# Patient Record
Sex: FEMALE | Race: WHITE | NOT HISPANIC OR LATINO | Employment: OTHER | URBAN - METROPOLITAN AREA
[De-identification: names, ages, dates, MRNs, and addresses within clinical notes are randomized per-mention and may not be internally consistent; named-entity substitution may affect disease eponyms.]

---

## 2017-12-26 ENCOUNTER — TELEPHONE (OUTPATIENT)
Dept: OBSTETRICS AND GYNECOLOGY | Facility: CLINIC | Age: 57
End: 2017-12-26

## 2017-12-26 NOTE — TELEPHONE ENCOUNTER
Pt thinks she has a yeast infection.  C/o external itching and irritation.  She has tried Monistat without relief.  Scheduled new pt appt 1/2 and recommended she continue to use external cream until appt.  Advised this appt will not be for annual exam this will be for problem visit only, if the yeast infection clears up to call back to reschedule appt to an annual/new pt appt.

## 2018-01-02 ENCOUNTER — OFFICE VISIT (OUTPATIENT)
Dept: OBSTETRICS AND GYNECOLOGY | Facility: CLINIC | Age: 58
End: 2018-01-02
Payer: COMMERCIAL

## 2018-01-02 ENCOUNTER — LAB VISIT (OUTPATIENT)
Dept: LAB | Facility: HOSPITAL | Age: 58
End: 2018-01-02
Attending: OBSTETRICS & GYNECOLOGY
Payer: COMMERCIAL

## 2018-01-02 VITALS
DIASTOLIC BLOOD PRESSURE: 78 MMHG | HEIGHT: 65 IN | SYSTOLIC BLOOD PRESSURE: 142 MMHG | BODY MASS INDEX: 32.23 KG/M2 | WEIGHT: 193.44 LBS

## 2018-01-02 DIAGNOSIS — R73.03 PRE-DIABETES: Primary | ICD-10-CM

## 2018-01-02 DIAGNOSIS — N76.0 ACUTE VAGINITIS: ICD-10-CM

## 2018-01-02 DIAGNOSIS — R73.03 PRE-DIABETES: ICD-10-CM

## 2018-01-02 LAB
CANDIDA RRNA VAG QL PROBE: NEGATIVE
ESTIMATED AVG GLUCOSE: 346 MG/DL
G VAGINALIS RRNA GENITAL QL PROBE: POSITIVE
HBA1C MFR BLD HPLC: 13.7 %
T VAGINALIS RRNA GENITAL QL PROBE: NEGATIVE

## 2018-01-02 PROCEDURE — 87480 CANDIDA DNA DIR PROBE: CPT

## 2018-01-02 PROCEDURE — 99999 PR PBB SHADOW E&M-EST. PATIENT-LVL III: CPT | Mod: PBBFAC,,, | Performed by: OBSTETRICS & GYNECOLOGY

## 2018-01-02 PROCEDURE — 99203 OFFICE O/P NEW LOW 30 MIN: CPT | Mod: S$GLB,,, | Performed by: OBSTETRICS & GYNECOLOGY

## 2018-01-02 PROCEDURE — 83036 HEMOGLOBIN GLYCOSYLATED A1C: CPT

## 2018-01-02 RX ORDER — BUPROPION HYDROCHLORIDE 150 MG/1
TABLET, FILM COATED, EXTENDED RELEASE ORAL
Refills: 0 | COMMUNITY
Start: 2017-12-22 | End: 2018-01-02

## 2018-01-02 RX ORDER — FLUCONAZOLE 150 MG/1
150 TABLET ORAL ONCE
Qty: 1 TABLET | Refills: 1 | Status: SHIPPED | OUTPATIENT
Start: 2018-01-02 | End: 2018-01-02

## 2018-01-02 RX ORDER — HYDROCORTISONE AND ACETIC ACID 20.75; 10.375 MG/ML; MG/ML
SOLUTION AURICULAR (OTIC)
Refills: 3 | COMMUNITY
Start: 2017-12-29 | End: 2023-07-20

## 2018-01-02 RX ORDER — SULFACETAMIDE SODIUM 100 MG/ML
SOLUTION/ DROPS OPHTHALMIC
Refills: 5 | COMMUNITY
Start: 2017-12-28 | End: 2023-06-22

## 2018-01-02 RX ORDER — FLUTICASONE PROPIONATE 50 MCG
SPRAY, SUSPENSION (ML) NASAL
Refills: 10 | COMMUNITY
Start: 2017-12-29 | End: 2019-06-04

## 2018-01-02 RX ORDER — IBUPROFEN 200 MG
TABLET ORAL
Refills: 0 | COMMUNITY
Start: 2017-12-22 | End: 2019-06-04

## 2018-01-02 RX ORDER — BETAMETHASONE VALERATE 1.2 MG/G
1 OINTMENT TOPICAL 2 TIMES DAILY
Refills: 0 | COMMUNITY
Start: 2017-12-30 | End: 2018-01-02

## 2018-01-02 RX ORDER — NYSTATIN AND TRIAMCINOLONE ACETONIDE 100000; 1 [USP'U]/G; MG/G
CREAM TOPICAL
Qty: 30 G | Refills: 1 | Status: SHIPPED | OUTPATIENT
Start: 2018-01-02 | End: 2019-06-04

## 2018-01-02 RX ORDER — FEXOFENADINE HCL 60 MG
60 TABLET ORAL DAILY
COMMUNITY
End: 2019-06-04

## 2018-01-05 ENCOUNTER — TELEPHONE (OUTPATIENT)
Dept: OBSTETRICS AND GYNECOLOGY | Facility: CLINIC | Age: 58
End: 2018-01-05

## 2018-01-05 RX ORDER — METRONIDAZOLE 7.5 MG/G
1 GEL VAGINAL DAILY
Qty: 70 G | Refills: 0 | Status: SHIPPED | OUTPATIENT
Start: 2018-01-05 | End: 2018-01-10

## 2018-01-05 NOTE — TELEPHONE ENCOUNTER
Discussed blood labs and vaginal cultures will follow up with internal medicine for DM management

## 2018-01-07 NOTE — PROGRESS NOTES
Subjective:       Patient ID: Linn Price is a 57 y.o. female.    Chief Complaint:  Vaginal Itching (vaginal discharge, burning, dryness, and inflamations )      History of Present Illness  57 year old here with a several day to week history of persistent vulvar itching and pain.  Patient feel raw on her vulva making it hard to wear clothing or anything touch her bottom.  Denies vaginal bleeding or discharge but reports itching/burning feeling on the outside of her vagina.  No pelvic pain no bladder pain.  Denies frequency or dysuria.      GYN & OB History  No LMP recorded. Patient is postmenopausal.   Date of Last Pap: No result found    OB History    Para Term  AB Living   0 0 0 0 0 0   SAB TAB Ectopic Multiple Live Births   0 0 0 0           Obstetric Comments   Menarche age 11.  LMP age 48.   History of abnormal PAP smear: NO.   History of abnormal mammogram: NO.   History of sexually transmitted disease:  NO       Past Medical History:   Diagnosis Date    Benign hypertension 2014    Broken fingers     Broken toe     Colon polyps 2014    Diabetes mellitus     Elevated fasting glucose 2014    Family history of bladder cancer     Family history of heart disease 2014    History of broken nose     JANAE on CPAP 11/15/2007    Per PSG: Rx'd CPAP, pressure 13, using small Comfort Select mask or interface of patient's choice, chin strap, and heated humidifier      Reflux esophagitis 2014    Per EGD 2006      Ulcer        Past Surgical History:   Procedure Laterality Date    GASTRIC RESTRICTION SURGERY      pyloric stenosis as infant    WISDOM TOOTH EXTRACTION         Review of Systems  Review of Systems   Respiratory: Negative for stridor.    Cardiovascular: Negative for chest pain.   Gastrointestinal: Negative for abdominal distention and constipation.   Endocrine: Negative for heat intolerance.   Genitourinary: Negative for difficulty urinating, dysuria,  genital sores, menstrual problem, pelvic pain and urgency.        Vulvar burning and pain    Psychiatric/Behavioral: Negative for dysphoric mood.        Objective:   Physical Exam:        Pulmonary/Chest: Effort normal.        Abdominal: Soft. She exhibits no distension.     Genitourinary:   Genitourinary Comments: Entire vulvar irritated and covered with white   Vagina no yeast seen and cervix normal  No bimanual performed due to discomfort   No vulvar lesion or ulcers               Neurological: She is alert.     Psychiatric: She has a normal mood and affect.        Assessment/ Plan:     Pre-diabetes  -     Hemoglobin A1c; Future    Acute vaginitis  -     Vaginosis Screen by DNA Probe    Other orders  -     fluconazole (DIFLUCAN) 150 MG Tab; Take 1 tablet (150 mg total) by mouth once. REFILL AND RE-DOSE IF SYMPTOMS RECUR  Dispense: 1 tablet; Refill: 1  -     nystatin-triamcinolone (MYCOLOG II) cream; Apply to affected area 2 times daily  Dispense: 30 g; Refill: 1         Return in about 3 months (around 4/2/2018) for annual exam .    Patient was counseled today on A.C.S. Pap guidelines and recommendations for yearly pelvic exams, mammograms and monthly self breast exams; to see her PCP for other health maintenance.

## 2018-04-18 DIAGNOSIS — Z12.31 VISIT FOR SCREENING MAMMOGRAM: Primary | ICD-10-CM

## 2018-05-08 ENCOUNTER — APPOINTMENT (OUTPATIENT)
Dept: RADIOLOGY | Facility: OTHER | Age: 58
End: 2018-05-08
Attending: OBSTETRICS & GYNECOLOGY
Payer: COMMERCIAL

## 2018-05-08 ENCOUNTER — OFFICE VISIT (OUTPATIENT)
Dept: OBSTETRICS AND GYNECOLOGY | Facility: CLINIC | Age: 58
End: 2018-05-08
Payer: COMMERCIAL

## 2018-05-08 VITALS — BODY MASS INDEX: 32.15 KG/M2 | HEIGHT: 65 IN | WEIGHT: 193 LBS

## 2018-05-08 DIAGNOSIS — Z13.820 SCREENING FOR OSTEOPOROSIS: Primary | ICD-10-CM

## 2018-05-08 DIAGNOSIS — Z01.419 ENCOUNTER FOR GYNECOLOGICAL EXAMINATION (GENERAL) (ROUTINE) WITHOUT ABNORMAL FINDINGS: ICD-10-CM

## 2018-05-08 DIAGNOSIS — Z12.4 SCREENING FOR CERVICAL CANCER: ICD-10-CM

## 2018-05-08 DIAGNOSIS — Z12.31 VISIT FOR SCREENING MAMMOGRAM: ICD-10-CM

## 2018-05-08 PROCEDURE — 99396 PREV VISIT EST AGE 40-64: CPT | Mod: S$GLB,,, | Performed by: OBSTETRICS & GYNECOLOGY

## 2018-05-08 PROCEDURE — 99999 PR PBB SHADOW E&M-EST. PATIENT-LVL II: CPT | Mod: PBBFAC,,, | Performed by: OBSTETRICS & GYNECOLOGY

## 2018-05-08 PROCEDURE — 87624 HPV HI-RISK TYP POOLED RSLT: CPT

## 2018-05-08 PROCEDURE — 77063 BREAST TOMOSYNTHESIS BI: CPT | Mod: 26,,, | Performed by: RADIOLOGY

## 2018-05-08 PROCEDURE — 77067 SCR MAMMO BI INCL CAD: CPT | Mod: 26,,, | Performed by: RADIOLOGY

## 2018-05-08 PROCEDURE — 88175 CYTOPATH C/V AUTO FLUID REDO: CPT

## 2018-05-08 PROCEDURE — 77067 SCR MAMMO BI INCL CAD: CPT | Mod: TC,PN

## 2018-05-14 LAB
HPV16 AG SPEC QL: NEGATIVE
HPV16+18+H RISK 12 DNA CVX-IMP: NEGATIVE
HPV18 DNA SPEC QL NAA+PROBE: NEGATIVE

## 2018-05-15 ENCOUNTER — APPOINTMENT (OUTPATIENT)
Dept: RADIOLOGY | Facility: CLINIC | Age: 58
End: 2018-05-15
Attending: OBSTETRICS & GYNECOLOGY
Payer: COMMERCIAL

## 2018-05-15 DIAGNOSIS — Z13.820 SCREENING FOR OSTEOPOROSIS: ICD-10-CM

## 2018-05-15 PROCEDURE — 77080 DXA BONE DENSITY AXIAL: CPT | Mod: 26,,, | Performed by: INTERNAL MEDICINE

## 2018-05-15 PROCEDURE — 77080 DXA BONE DENSITY AXIAL: CPT | Mod: TC,PO

## 2018-06-02 NOTE — PROGRESS NOTES
Subjective:       Patient ID: Linn Price is a 58 y.o. female.    Chief Complaint:  Well Woman (Annual Exam  --  Last Pap ? (Dr Brent Wilson)  --  Last MMG Today 18  --  Colonosocpy , Polyp  --  Dexa )      History of Present Illness  58 year old here for annual exam.  No new concerns.  Desires pap and due for bone density.  Patient is scheduled for mammogram today.  Denies pelvic pain or vaginal bleeding.  No breast concerns.  No hot flashes or menopause concerns.      GYN & OB History  No LMP recorded. Patient is postmenopausal.   Date of Last Pap: 2018    OB History    Para Term  AB Living   0 0 0 0 0 0   SAB TAB Ectopic Multiple Live Births   0 0 0 0           Obstetric Comments   Menarche age 11.  LMP age 48.   History of abnormal PAP smear: NO.   History of abnormal mammogram: NO.   History of sexually transmitted disease:  NO       Past Medical History:   Diagnosis Date    Benign hypertension 2014    Broken fingers     Broken toe     Colon polyps 2014    Diabetes mellitus     Elevated fasting glucose 2014    Family history of bladder cancer     Family history of heart disease 2014    History of broken nose     Menopause     JANAE on CPAP 11/15/2007    Per PSG: Rx'd CPAP, pressure 13, using small Comfort Select mask or interface of patient's choice, chin strap, and heated humidifier      Reflux esophagitis 2014    Per EGD 2006      Stenosis, intestine     as a child    Ulcer        Past Surgical History:   Procedure Laterality Date    COLONOSCOPY      Scheduled for 2018     GASTRIC RESTRICTION SURGERY      pyloric stenosis as infant    UPPER GASTROINTESTINAL ENDOSCOPY  2006    WISDOM TOOTH EXTRACTION         Review of Systems  Review of Systems   Constitutional: Negative for chills and fever.   Respiratory: Negative for chest tightness.    Cardiovascular: Negative for chest pain.   Gastrointestinal: Negative for abdominal  pain.   Endocrine: Negative for heat intolerance.   Genitourinary: Negative for menstrual problem, pelvic pain and vaginal bleeding.   Psychiatric/Behavioral: Negative for dysphoric mood.        Objective:   Physical Exam:   Constitutional: She is oriented to person, place, and time. She appears well-developed and well-nourished.      Neck: No thyromegaly present.    Cardiovascular: Normal rate.     Pulmonary/Chest: Effort normal and breath sounds normal. Right breast exhibits no mass, no nipple discharge, no skin change, no tenderness and no bleeding. Left breast exhibits no mass, no nipple discharge, no skin change, no tenderness and no bleeding.        Abdominal: Soft. Normal appearance and bowel sounds are normal. She exhibits no distension and no mass. There is no tenderness. There is no rebound and no guarding.     Genitourinary: Vagina normal and uterus normal. Pelvic exam was performed with patient supine. There is no rash, tenderness, lesion or injury on the right labia. There is no rash, tenderness, lesion or injury on the left labia. Uterus is not enlarged, not fixed and not tender. Cervix is normal. Right adnexum displays no mass, no tenderness and no fullness. Left adnexum displays no mass, no tenderness and no fullness. No erythema, tenderness, rectocele, cystocele or unspecified prolapse of vaginal walls in the vagina. No signs of injury around the vagina. No vaginal discharge found. Cervix exhibits no motion tenderness, no discharge and no friability.           Musculoskeletal: Normal range of motion and moves all extremeties.      Lymphadenopathy:     She has no axillary adenopathy.        Right: No supraclavicular adenopathy present.        Left: No supraclavicular adenopathy present.    Neurological: She is alert and oriented to person, place, and time.    Skin: Skin is warm and dry.    Psychiatric: She has a normal mood and affect. Her behavior is normal. Judgment normal.        Assessment/ Plan:      Screening for osteoporosis  -     DXA Bone Density Spine And Hip; Future; Expected date: 05/08/2018    Screening for cervical cancer  -     Liquid-based pap smear, screening  -     HPV High Risk Genotypes, PCR    Encounter for gynecological examination (general) (routine) without abnormal findings    mammogram today      No Follow-up on file.    Patient was counseled today on A.C.S. Pap guidelines and recommendations for yearly pelvic exams, mammograms and monthly self breast exams; to see her PCP for other health maintenance.

## 2018-10-27 ENCOUNTER — OFFICE VISIT (OUTPATIENT)
Dept: URGENT CARE | Facility: CLINIC | Age: 58
End: 2018-10-27
Payer: COMMERCIAL

## 2018-10-27 VITALS
RESPIRATION RATE: 19 BRPM | TEMPERATURE: 97 F | OXYGEN SATURATION: 97 % | SYSTOLIC BLOOD PRESSURE: 146 MMHG | WEIGHT: 170 LBS | DIASTOLIC BLOOD PRESSURE: 84 MMHG | HEART RATE: 71 BPM | BODY MASS INDEX: 28.32 KG/M2 | HEIGHT: 65 IN
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
## 2018-10-27 DIAGNOSIS — M79.642 LEFT HAND PAIN: ICD-10-CM

## 2018-10-27 PROCEDURE — 3079F DIAST BP 80-89 MM HG: CPT | Mod: CPTII,S$GLB,, | Performed by: NURSE PRACTITIONER

## 2018-10-27 PROCEDURE — 3008F BODY MASS INDEX DOCD: CPT | Mod: CPTII,S$GLB,, | Performed by: NURSE PRACTITIONER

## 2018-10-27 PROCEDURE — 73130 X-RAY EXAM OF HAND: CPT | Mod: FY,LT,S$GLB, | Performed by: INTERNAL MEDICINE

## 2018-10-27 PROCEDURE — 3077F SYST BP >= 140 MM HG: CPT | Mod: CPTII,S$GLB,, | Performed by: NURSE PRACTITIONER

## 2018-10-27 PROCEDURE — 99214 OFFICE O/P EST MOD 30 MIN: CPT | Mod: S$GLB,,, | Performed by: NURSE PRACTITIONER

## 2018-10-27 RX ORDER — METFORMIN HYDROCHLORIDE 500 MG/1
500 TABLET, EXTENDED RELEASE ORAL DAILY
Refills: 1 | COMMUNITY
Start: 2018-10-08 | End: 2021-03-15 | Stop reason: SDUPTHER

## 2018-10-27 RX ORDER — NAPROXEN 500 MG/1
500 TABLET ORAL 2 TIMES DAILY WITH MEALS
Qty: 20 TABLET | Refills: 0 | Status: SHIPPED | OUTPATIENT
Start: 2018-10-27 | End: 2018-11-06

## 2018-10-27 NOTE — PROGRESS NOTES
"Subjective:       Patient ID: Linn Price is a 58 y.o. female.    Vitals:  height is 5' 5" (1.651 m) and weight is 77.1 kg (170 lb). Her oral temperature is 97.2 °F (36.2 °C). Her blood pressure is 146/84 (abnormal) and her pulse is 71. Her respiration is 19 and oxygen saturation is 97%.     Chief Complaint: Hand Injury (left )    Hand Injury    Her dominant hand is their right hand. The incident occurred 12 to 24 hours ago. The incident occurred at home. The injury mechanism was a fall. The pain is present in the left hand. The quality of the pain is described as shooting and aching. The pain radiates to the left arm. The pain is at a severity of 8/10. The pain is severe. The pain has been constant since the incident. Pertinent negatives include no chest pain, muscle weakness, numbness or tingling. The symptoms are aggravated by movement and lifting. She has tried heat for the symptoms. The treatment provided mild relief.     Review of Systems   Constitution: Negative for weakness and malaise/fatigue.   HENT: Negative for nosebleeds.    Cardiovascular: Negative for chest pain and syncope.   Respiratory: Negative for shortness of breath.    Musculoskeletal: Negative for back pain, joint pain and neck pain.   Gastrointestinal: Negative for abdominal pain.   Genitourinary: Negative for hematuria.   Neurological: Negative for dizziness, numbness and tingling.       Objective:      Physical Exam   Constitutional: She is oriented to person, place, and time. She appears well-developed and well-nourished. She is cooperative.  Non-toxic appearance. She does not appear ill. No distress.   HENT:   Head: Normocephalic and atraumatic. Head is without abrasion, without contusion and without laceration.   Right Ear: Hearing, tympanic membrane, external ear and ear canal normal. No hemotympanum.   Left Ear: Hearing, tympanic membrane, external ear and ear canal normal. No hemotympanum.   Nose: Nose normal. No mucosal edema, " rhinorrhea or nasal deformity. No epistaxis. Right sinus exhibits no maxillary sinus tenderness and no frontal sinus tenderness. Left sinus exhibits no maxillary sinus tenderness and no frontal sinus tenderness.   Mouth/Throat: Uvula is midline, oropharynx is clear and moist and mucous membranes are normal. No trismus in the jaw. Normal dentition. No uvula swelling. No posterior oropharyngeal erythema.   Eyes: Conjunctivae, EOM and lids are normal. Pupils are equal, round, and reactive to light. Right eye exhibits no discharge. Left eye exhibits no discharge. No scleral icterus.   Sclera clear bilat   Neck: Trachea normal, normal range of motion, full passive range of motion without pain and phonation normal. Neck supple. No spinous process tenderness and no muscular tenderness present. No neck rigidity. No tracheal deviation present.   Cardiovascular: Normal rate, regular rhythm, normal heart sounds, intact distal pulses and normal pulses.   Pulmonary/Chest: Effort normal and breath sounds normal. No respiratory distress.   Abdominal: Soft. Normal appearance and bowel sounds are normal. She exhibits no distension, no pulsatile midline mass and no mass. There is no tenderness.   Musculoskeletal: She exhibits no edema or deformity.        Right shoulder: She exhibits decreased range of motion, tenderness, bony tenderness and swelling.        Arms:  Left hand, dorsal surface-swelling and TTP over head of third metacarpal.    Neurological: She is alert and oriented to person, place, and time. She has normal strength. No cranial nerve deficit or sensory deficit. She exhibits normal muscle tone. She displays no seizure activity. Coordination normal. GCS eye subscore is 4. GCS verbal subscore is 5. GCS motor subscore is 6.   Skin: Skin is warm, dry and intact. Capillary refill takes less than 2 seconds. No abrasion, no bruising, no burn, no ecchymosis and no laceration noted. She is not diaphoretic. No pallor.    Psychiatric: She has a normal mood and affect. Her speech is normal and behavior is normal. Judgment and thought content normal. Cognition and memory are normal.   Nursing note and vitals reviewed.    Xr Hand Complete 3 View Left    Result Date: 10/27/2018  EXAMINATION: XR HAND COMPLETE 3 VIEW LEFT CLINICAL HISTORY: pain dorsal aspect,;. Unspecified fall, initial encounter TECHNIQUE: PA, lateral, and oblique views of the left hand were performed. COMPARISON: None FINDINGS: There are mild degenerative changes within the hand, particularly at distal interphalangeal joints.  There is no evidence of fracture.     Mild degenerative change Electronically signed by: Barbara Martines MD Date:    10/27/2018 Time:    17:04    Assessment:       1. Fall, initial encounter    2. Left hand pain        Plan:         Fall, initial encounter  -     XR HAND COMPLETE 3 VIEW LEFT; Future; Expected date: 10/27/2018  -     Cancel: ORTHOPEDIC BRACING FOR HOME USE - UPPER EXTREMITY  -     naproxen (NAPROSYN) 500 MG tablet; Take 1 tablet (500 mg total) by mouth 2 (two) times daily with meals. for 10 days  Dispense: 20 tablet; Refill: 0    Left hand pain  -     XR HAND COMPLETE 3 VIEW LEFT; Future; Expected date: 10/27/2018  -     Cancel: ORTHOPEDIC BRACING FOR HOME USE - UPPER EXTREMITY  -     naproxen (NAPROSYN) 500 MG tablet; Take 1 tablet (500 mg total) by mouth 2 (two) times daily with meals. for 10 days  Dispense: 20 tablet; Refill: 0      Patient Instructions   Follow up with your doctor in a few days.  Return to the urgent care or go to the ER if symptoms get worse.    The final reading of your xray showed no dislocation or fracture.    Keep hand immobilizer for the next week and then based on comfort.  Apply cool compress 3 times a day, 20min at a time , for the next 3 days.  Elevate when able.      Hand Contusion  You have a contusion. This is also called a bruise. There is swelling and some bleeding under the skin, but no broken  bones. This injury generally takes a few days to a few weeks to heal.  During that time, the bruise will typically change in color from reddish, to purple-blue, to greenish-yellow, then to yellow-brown.  Home care  · Elevate the hand to reduce pain and swelling. As much as possible, sit or lie down with the hand raised about the level of your heart. This is especially important during the first 48 hours.  · Ice the hand to help reduce pain and swelling. Wrap a cold source (ice pack or ice cubes in a plastic bag) in a thin towel. Apply to the bruised area for 20 minutes every 1 to 2 hours the first day. Continue this 3 to 4 times a day until the pain and swelling goes away.  · Unless another medicine was prescribed, you can take acetaminophen, ibuprofen, or naproxen to control pain. (If you have chronic liver or kidney disease or ever had a stomach ulcer or gastrointestinal bleeding, talk with your doctor before using these medicines.)  Follow up  Follow up with your healthcare provider or our staff as advised. Call if you are not improving within 1 to 2 weeks.  When to seek medical advice   Call your healthcare provider right away if you have any of the following:  · Increased pain or swelling  · Arm becomes cold, blue, numb or tingly  · Signs of infection: Warmth, drainage, or increased redness or pain around the bruise  · Inability to move the injured hand   · Frequent bruising for unknown reasons  Date Last Reviewed: 2/1/2017  © 3734-5406 [employer]. [address] [place] [address], [place] [address]. All rights reserved. This information is not intended as a substitute for professional medical care. Always follow your healthcare professional's instructions.

## 2018-10-27 NOTE — PATIENT INSTRUCTIONS
Follow up with your doctor in a few days.  Return to the urgent care or go to the ER if symptoms get worse.    The final reading of your xray showed no dislocation or fracture.    Keep hand immobilizer for the next week and then based on comfort.  Apply cool compress 3 times a day, 20min at a time , for the next 3 days.  Elevate when able.      Hand Contusion  You have a contusion. This is also called a bruise. There is swelling and some bleeding under the skin, but no broken bones. This injury generally takes a few days to a few weeks to heal.  During that time, the bruise will typically change in color from reddish, to purple-blue, to greenish-yellow, then to yellow-brown.  Home care  · Elevate the hand to reduce pain and swelling. As much as possible, sit or lie down with the hand raised about the level of your heart. This is especially important during the first 48 hours.  · Ice the hand to help reduce pain and swelling. Wrap a cold source (ice pack or ice cubes in a plastic bag) in a thin towel. Apply to the bruised area for 20 minutes every 1 to 2 hours the first day. Continue this 3 to 4 times a day until the pain and swelling goes away.  · Unless another medicine was prescribed, you can take acetaminophen, ibuprofen, or naproxen to control pain. (If you have chronic liver or kidney disease or ever had a stomach ulcer or gastrointestinal bleeding, talk with your doctor before using these medicines.)  Follow up  Follow up with your healthcare provider or our staff as advised. Call if you are not improving within 1 to 2 weeks.  When to seek medical advice   Call your healthcare provider right away if you have any of the following:  · Increased pain or swelling  · Arm becomes cold, blue, numb or tingly  · Signs of infection: Warmth, drainage, or increased redness or pain around the bruise  · Inability to move the injured hand   · Frequent bruising for unknown reasons  Date Last Reviewed: 2/1/2017  © 0538-0079  [employer]. [address] [place] [address], [place] [address]. All rights reserved. This information is not intended as a substitute for professional medical care. Always follow your healthcare professional's instructions.

## 2019-06-04 ENCOUNTER — APPOINTMENT (OUTPATIENT)
Dept: RADIOLOGY | Facility: OTHER | Age: 59
End: 2019-06-04
Attending: OBSTETRICS & GYNECOLOGY
Payer: COMMERCIAL

## 2019-06-04 ENCOUNTER — OFFICE VISIT (OUTPATIENT)
Dept: OBSTETRICS AND GYNECOLOGY | Facility: CLINIC | Age: 59
End: 2019-06-04
Payer: COMMERCIAL

## 2019-06-04 VITALS
BODY MASS INDEX: 32.32 KG/M2 | DIASTOLIC BLOOD PRESSURE: 80 MMHG | WEIGHT: 194 LBS | SYSTOLIC BLOOD PRESSURE: 126 MMHG | HEIGHT: 65 IN

## 2019-06-04 DIAGNOSIS — Z01.419 ENCOUNTER FOR GYNECOLOGICAL EXAMINATION (GENERAL) (ROUTINE) WITHOUT ABNORMAL FINDINGS: Primary | ICD-10-CM

## 2019-06-04 DIAGNOSIS — Z12.31 VISIT FOR SCREENING MAMMOGRAM: ICD-10-CM

## 2019-06-04 DIAGNOSIS — Z12.39 SCREENING FOR BREAST CANCER: ICD-10-CM

## 2019-06-04 PROCEDURE — 3074F PR MOST RECENT SYSTOLIC BLOOD PRESSURE < 130 MM HG: ICD-10-PCS | Mod: CPTII,S$GLB,, | Performed by: OBSTETRICS & GYNECOLOGY

## 2019-06-04 PROCEDURE — 99396 PREV VISIT EST AGE 40-64: CPT | Mod: S$GLB,,, | Performed by: OBSTETRICS & GYNECOLOGY

## 2019-06-04 PROCEDURE — 77067 SCR MAMMO BI INCL CAD: CPT | Mod: 26,,, | Performed by: RADIOLOGY

## 2019-06-04 PROCEDURE — 77063 MAMMO DIGITAL SCREENING BILAT WITH TOMOSYNTHESIS_CAD: ICD-10-PCS | Mod: 26,,, | Performed by: RADIOLOGY

## 2019-06-04 PROCEDURE — 77067 SCR MAMMO BI INCL CAD: CPT | Mod: TC,PN

## 2019-06-04 PROCEDURE — 3079F DIAST BP 80-89 MM HG: CPT | Mod: CPTII,S$GLB,, | Performed by: OBSTETRICS & GYNECOLOGY

## 2019-06-04 PROCEDURE — 3074F SYST BP LT 130 MM HG: CPT | Mod: CPTII,S$GLB,, | Performed by: OBSTETRICS & GYNECOLOGY

## 2019-06-04 PROCEDURE — 77067 MAMMO DIGITAL SCREENING BILAT WITH TOMOSYNTHESIS_CAD: ICD-10-PCS | Mod: 26,,, | Performed by: RADIOLOGY

## 2019-06-04 PROCEDURE — 99999 PR PBB SHADOW E&M-EST. PATIENT-LVL III: CPT | Mod: PBBFAC,,, | Performed by: OBSTETRICS & GYNECOLOGY

## 2019-06-04 PROCEDURE — 77063 BREAST TOMOSYNTHESIS BI: CPT | Mod: 26,,, | Performed by: RADIOLOGY

## 2019-06-04 PROCEDURE — 3079F PR MOST RECENT DIASTOLIC BLOOD PRESSURE 80-89 MM HG: ICD-10-PCS | Mod: CPTII,S$GLB,, | Performed by: OBSTETRICS & GYNECOLOGY

## 2019-06-04 PROCEDURE — 99396 PR PREVENTIVE VISIT,EST,40-64: ICD-10-PCS | Mod: S$GLB,,, | Performed by: OBSTETRICS & GYNECOLOGY

## 2019-06-04 PROCEDURE — 99999 PR PBB SHADOW E&M-EST. PATIENT-LVL III: ICD-10-PCS | Mod: PBBFAC,,, | Performed by: OBSTETRICS & GYNECOLOGY

## 2019-06-04 RX ORDER — TRIAMCINOLONE ACETONIDE 1 MG/G
CREAM TOPICAL
Refills: 0 | COMMUNITY
Start: 2019-03-21 | End: 2022-01-21

## 2019-06-04 RX ORDER — AZELASTINE 1 MG/ML
SPRAY, METERED NASAL
Refills: 0 | COMMUNITY
Start: 2019-03-21 | End: 2021-01-15

## 2019-06-04 NOTE — PROGRESS NOTES
Subjective:       Patient ID: Linn Price is a 59 y.o. female.    Chief Complaint:  Well Woman (last pap/hpv 18, Negative  --  mmg today --last mmg 18, birads 1  --  colonoscopy 2018, benign polyps   --   dexa 5-15-18, Normal )      History of Present Illness  59 year old here for annual.  Reports mold at home and doing remediation.  Denies gyn concerns.  No vaginal bleeding.  No breast concerns.  No hot flashes or vaginal dryness.  Reports feeling well with mild trouble sleeping due to her dogs waking her up.  Patient has hemorrhoids and not interested in treatment.  Colonoscopy last year.  Discussed lifestyle changes.      GYN & OB History  No LMP recorded (lmp unknown). Patient is postmenopausal.   Date of Last Pap: 2018    OB History    Para Term  AB Living   0 0 0 0 0 0   SAB TAB Ectopic Multiple Live Births   0 0 0 0     Obstetric Comments   Menarche age 11.  LMP age 48.   History of abnormal PAP smear: NO.   History of abnormal mammogram: NO.   History of sexually transmitted disease:  NO       Past Medical History:   Diagnosis Date    Benign hypertension 2014    Broken fingers     Broken toe     Colon polyps 2014    Diabetes mellitus     Elevated fasting glucose 2014    Family history of bladder cancer     Family history of heart disease 2014    History of broken nose     Menopause     JANAE on CPAP 11/15/2007    Per PSG: Rx'd CPAP, pressure 13, using small Comfort Select mask or interface of patient's choice, chin strap, and heated humidifier      Reflux esophagitis 2014    Per EGD 2006      Stenosis, intestine     as a child    Tobacco abuse     Ulcer        Past Surgical History:   Procedure Laterality Date    COLONOSCOPY  2018    Benign Polyps     GASTRIC RESTRICTION SURGERY      pyloric stenosis as infant    UPPER GASTROINTESTINAL ENDOSCOPY  2006    WISDOM TOOTH EXTRACTION         Review of Systems  Review of Systems    Constitutional: Negative for fatigue.   Respiratory: Negative for shortness of breath.    Cardiovascular: Negative for chest pain.   Gastrointestinal: Negative for abdominal pain, constipation, diarrhea and nausea.   Endocrine: Negative for heat intolerance.   Genitourinary: Negative for dyspareunia, dysuria, menstrual problem, pelvic pain and vaginal bleeding.   Musculoskeletal: Negative for back pain.   Skin: Negative for rash.   Neurological: Negative for headaches.   Hematological: Negative for adenopathy.   Psychiatric/Behavioral: Negative for dysphoric mood. The patient is not nervous/anxious.         Objective:   Physical Exam:   Constitutional: She is oriented to person, place, and time. She appears well-developed and well-nourished.      Neck: No thyromegaly present.     Pulmonary/Chest: Effort normal. Right breast exhibits no mass, no nipple discharge, no skin change, no tenderness and no bleeding. Left breast exhibits no mass, no nipple discharge, no skin change, no tenderness and no bleeding.        Abdominal: Soft. Normal appearance and bowel sounds are normal. She exhibits no distension and no mass. There is no tenderness. There is no rebound and no guarding.     Genitourinary: Vagina normal and uterus normal. Rectal exam shows external hemorrhoid. Pelvic exam was performed with patient supine. There is no rash, tenderness, lesion or injury on the right labia. There is no rash, tenderness, lesion or injury on the left labia. Uterus is not enlarged, not fixed and not tender. Cervix is normal. Right adnexum displays no mass, no tenderness and no fullness. Left adnexum displays no mass, no tenderness and no fullness. No erythema, tenderness, rectocele, cystocele or unspecified prolapse of vaginal walls in the vagina. No signs of injury around the vagina. No vaginal discharge found. Cervix exhibits no motion tenderness, no discharge and no friability.           Musculoskeletal: Normal range of motion and  moves all extremeties.      Lymphadenopathy:     She has no axillary adenopathy.        Right: No supraclavicular adenopathy present.        Left: No supraclavicular adenopathy present.    Neurological: She is alert and oriented to person, place, and time.    Skin: Skin is warm and dry.    Psychiatric: She has a normal mood and affect. Her behavior is normal. Judgment normal.        Assessment/ Plan:     Encounter for gynecological examination (general) (routine) without abnormal findings    Screening for breast cancer    mammogram today   Discussed vitamins and probiotics  Smoking cessation        Follow up in about 1 year (around 6/4/2020).    Patient was counseled today on A.C.S. Pap guidelines and recommendations for yearly pelvic exams, mammograms and monthly self breast exams; to see her PCP for other health maintenance.

## 2020-09-01 LAB
LEFT EYE DM RETINOPATHY: NEGATIVE
RIGHT EYE DM RETINOPATHY: NEGATIVE

## 2020-11-24 ENCOUNTER — TELEPHONE (OUTPATIENT)
Dept: PRIMARY CARE CLINIC | Facility: CLINIC | Age: 60
End: 2020-11-24

## 2020-11-24 NOTE — TELEPHONE ENCOUNTER
----- Message from Ariela Ross sent at 11/24/2020  2:41 PM CST -----  Contact: self   Patient is returning a phone call.  Who left a message for the patient: Helen Guzman MA  Does patient know what this is regarding:  depression  Comments:

## 2020-11-24 NOTE — TELEPHONE ENCOUNTER
Please see when we can get pt an appt.  Kam is struggling with depression.  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  Dr. YEAGER

## 2020-11-24 NOTE — TELEPHONE ENCOUNTER
I sw pt. She is a former pt of 's of  that needs an appt to re-est. Pt scheduled for next Tuesday 12/1

## 2020-12-01 ENCOUNTER — OFFICE VISIT (OUTPATIENT)
Dept: PRIMARY CARE CLINIC | Facility: CLINIC | Age: 60
End: 2020-12-01
Payer: COMMERCIAL

## 2020-12-01 ENCOUNTER — HOSPITAL ENCOUNTER (OUTPATIENT)
Dept: RADIOLOGY | Facility: HOSPITAL | Age: 60
Discharge: HOME | End: 2020-12-01
Attending: INTERNAL MEDICINE
Payer: MEDICAID

## 2020-12-01 VITALS
HEART RATE: 77 BPM | HEIGHT: 65 IN | RESPIRATION RATE: 18 BRPM | TEMPERATURE: 98 F | OXYGEN SATURATION: 99 % | BODY MASS INDEX: 35.74 KG/M2 | DIASTOLIC BLOOD PRESSURE: 82 MMHG | WEIGHT: 214.5 LBS | SYSTOLIC BLOOD PRESSURE: 143 MMHG

## 2020-12-01 DIAGNOSIS — E66.01 CLASS 2 SEVERE OBESITY DUE TO EXCESS CALORIES WITH SERIOUS COMORBIDITY AND BODY MASS INDEX (BMI) OF 35.0 TO 35.9 IN ADULT: ICD-10-CM

## 2020-12-01 DIAGNOSIS — H60.501 ACUTE OTITIS EXTERNA OF RIGHT EAR, UNSPECIFIED TYPE: ICD-10-CM

## 2020-12-01 DIAGNOSIS — G47.33 OSA ON CPAP: ICD-10-CM

## 2020-12-01 DIAGNOSIS — E78.5 HYPERLIPIDEMIA, UNSPECIFIED HYPERLIPIDEMIA TYPE: ICD-10-CM

## 2020-12-01 DIAGNOSIS — R91.1 PULMONARY NODULE: ICD-10-CM

## 2020-12-01 DIAGNOSIS — E55.9 VITAMIN D DEFICIENCY: ICD-10-CM

## 2020-12-01 DIAGNOSIS — Z12.31 SCREENING MAMMOGRAM, ENCOUNTER FOR: ICD-10-CM

## 2020-12-01 DIAGNOSIS — L40.9 PSORIASIS: ICD-10-CM

## 2020-12-01 DIAGNOSIS — Z00.00 NORMAL PHYSICAL EXAM, ROUTINE: ICD-10-CM

## 2020-12-01 DIAGNOSIS — E11.9 CONTROLLED TYPE 2 DIABETES MELLITUS WITHOUT COMPLICATION, WITHOUT LONG-TERM CURRENT USE OF INSULIN: Primary | Chronic | ICD-10-CM

## 2020-12-01 DIAGNOSIS — K21.00 GASTROESOPHAGEAL REFLUX DISEASE WITH ESOPHAGITIS WITHOUT HEMORRHAGE: ICD-10-CM

## 2020-12-01 PROCEDURE — 99999 PR PBB SHADOW E&M-EST. PATIENT-LVL V: CPT | Mod: PBBFAC,,, | Performed by: INTERNAL MEDICINE

## 2020-12-01 PROCEDURE — 99999 PR PBB SHADOW E&M-EST. PATIENT-LVL V: ICD-10-PCS | Mod: PBBFAC,,, | Performed by: INTERNAL MEDICINE

## 2020-12-01 PROCEDURE — 77067 MAMMO DIGITAL SCREENING BILAT WITH TOMO: ICD-10-PCS | Mod: 26,,, | Performed by: RADIOLOGY

## 2020-12-01 PROCEDURE — 77067 SCR MAMMO BI INCL CAD: CPT | Mod: 26,,, | Performed by: RADIOLOGY

## 2020-12-01 PROCEDURE — 77063 BREAST TOMOSYNTHESIS BI: CPT | Mod: 26,,, | Performed by: RADIOLOGY

## 2020-12-01 PROCEDURE — 99214 PR OFFICE/OUTPT VISIT, EST, LEVL IV, 30-39 MIN: ICD-10-PCS | Mod: S$PBB,,, | Performed by: INTERNAL MEDICINE

## 2020-12-01 PROCEDURE — 77063 MAMMO DIGITAL SCREENING BILAT WITH TOMO: ICD-10-PCS | Mod: 26,,, | Performed by: RADIOLOGY

## 2020-12-01 PROCEDURE — 99214 OFFICE O/P EST MOD 30 MIN: CPT | Mod: S$PBB,,, | Performed by: INTERNAL MEDICINE

## 2020-12-01 PROCEDURE — 99215 OFFICE O/P EST HI 40 MIN: CPT | Mod: PBBFAC,PN | Performed by: INTERNAL MEDICINE

## 2020-12-01 PROCEDURE — 77067 SCR MAMMO BI INCL CAD: CPT | Mod: TC,PN

## 2020-12-01 RX ORDER — PREDNISOLONE ACETATE 10 MG/ML
SUSPENSION/ DROPS OPHTHALMIC
COMMUNITY
Start: 2020-11-30 | End: 2023-06-22

## 2020-12-01 RX ORDER — CIPROFLOXACIN AND DEXAMETHASONE 3; 1 MG/ML; MG/ML
4 SUSPENSION/ DROPS AURICULAR (OTIC) 2 TIMES DAILY
Qty: 7.5 ML | Refills: 0 | Status: SHIPPED | OUTPATIENT
Start: 2020-12-01 | End: 2020-12-08

## 2020-12-01 RX ORDER — BETAMETHASONE VALERATE 1.2 MG/G
OINTMENT TOPICAL
COMMUNITY
Start: 2020-11-30 | End: 2021-01-15

## 2020-12-01 RX ORDER — FLUOCINONIDE 0.5 MG/G
CREAM TOPICAL
COMMUNITY
Start: 2020-11-23 | End: 2021-05-06

## 2020-12-01 RX ORDER — ERGOCALCIFEROL 1.25 MG/1
50000 CAPSULE ORAL
COMMUNITY
Start: 2020-11-30 | End: 2021-05-06

## 2020-12-01 RX ORDER — CIPROFLOXACIN 500 MG/1
500 TABLET ORAL EVERY 12 HOURS
Qty: 10 TABLET | Refills: 0 | Status: SHIPPED | OUTPATIENT
Start: 2020-12-01 | End: 2020-12-06

## 2020-12-01 NOTE — PROGRESS NOTES
[facility] Note    Chief Complaint      Chief Complaint   Patient presents with    Establish Care       History of Present Illness      Linn Price is a 60 y.o. WF with HTN, DM - II, JANAE on CPAP, Chronic Constipation, GERD, Colon polyps,Pulm nodule, and Nicotine Dependence in Remission presents to re-est care with me and to fu chronic issues.     Pulm nodule - Due for repeat CT chest with Lung Ca screening protocol in May. Will get old records to review.     Nicotine Dependence -in remission - Pt quit 1/12/20.  Congratulated on cessation.     HTN - Pt never on meds for this.  Will repeat BP.      DM - II - She just had labs with Dr. Mueller and awaits the results.  She has not been following low carb diet. I rec she resume. Pt diet controlled.      JANAE on CPAP - She uses it nightly x 6-7 hrrs. Rec low carb diet and exercise for wt loss.     Chronic constipation - Doing well at present.  Rec adeq hydration.  Eating a yogurt daily has helped.     Colon polyps - Fu by Dr. Swain.     GERD - Rec reflux prec. Can take Pepcid prn.     Vit D def - 14 - Pt on Ergocalciferol once weekly. Will repeat if needed next visit.  When complete take Vit D3 2000 u/d.    Psoriasis - Fu by Derm - Dr. [facility]    Otitis Externa - Fu by ENT, Dr. David. Pt on Sulfacetamide drops.     Obesity - BMI - 35.7 - Rec low carb diet and exercising.  Will get recent labs.  Consider Jardiance or Ozempic. Pt has done well in past with diet and exercise and lost wt and got off DM meds.     HLD - Pt not on pharmacotherapy for this. Will obtain recent labs to review.    Lab review:   5/22/20 - WBC - 7.6;  H/H - 12.1/36.3; Plt - 338;  BUN/Cr - 12/0.7; LFt's - wnl;   TG 80; Ha1c - 6.9; TSH - 1.52;  Vit D -14     HCM - Flu - none - refuses;  Tdap - , 10 yrs ago;  PCV 13 - none - declines;  PVX 23 - none - declines;  Shingrix - none - declines;  MGM - 6/4/19 - neg;  DEXA - ?;  PAP -5/8/18 - neg; Hep C Screen - 8/26/14 - neg;   HIV Screen - none - defers; C-scope - ?;  Prev PCP - me at [facility] and then Dr. Armenta;  OB/GYN - Dr. Catalan;  GI - Dr. Swain; A/I - Dr. Duque;  ENT- Dr. David; Derm - Dr. [facility]; Ophtho - Dr. Shin    Past Medical History:  Past Medical History:   Diagnosis Date    Benign hypertension 2014    Broken fingers     Broken toe     Colon polyps 2014    Diabetes mellitus     Elevated fasting glucose 2014    Family history of bladder cancer     Family history of heart disease 2014    History of broken nose     Menopause     JANAE on CPAP 11/15/2007    Per PSG: Rx'd CPAP, pressure 13, using small Comfort Select mask or interface of patient's choice, chin strap, and heated humidifier      Pyloric stenosis     as a child    Reflux esophagitis 2014    Per EGD 2006      Tobacco abuse     Ulcer     at 14 y.o.       Past Surgical History:   has a past surgical history that includes Gastric restriction surgery; [place] tooth extraction; Upper gastrointestinal endoscopy (); and Colonoscopy ().    Family History:  family history includes Bladder Cancer in her maternal uncle; COPD in her sister; Dementia in her mother; Diabetes in her father and sister; Hypertension in her brother, mother, and sister; Kidney disease in her mother; Macular degeneration in her mother; Prostate cancer in her father; Psoriasis in her mother; Stroke (age of onset: 60) in her mother; Stroke (age of onset: 75) in her father.     Social History:  Social History     Tobacco Use    Smoking status: Former Smoker     Packs/day: 1.50     Years: 40.00     Pack years: 60.00     Types: Cigarettes     Start date: 3/17/1973     Quit date: 2020     Years since quittin.8    Smokeless tobacco: Never Used   Substance Use Topics    Alcohol use: Yes     Comment: social - rare    Drug use: Never       Review of Systems   Constitutional: Negative for chills, diaphoresis and fever.   HENT: Positive for ear  discharge, ear pain and hearing loss. Negative for congestion, sore throat and tinnitus.    Eyes: Negative for blurred vision and double vision.   Respiratory: Negative for cough.    Cardiovascular: Negative for chest pain and palpitations.   Gastrointestinal: Positive for constipation and heartburn. Negative for abdominal pain, blood in stool, diarrhea, melena, nausea and vomiting.   Genitourinary: Negative for dysuria and frequency.   Musculoskeletal: Negative for joint pain and myalgias.   Skin: Positive for itching and rash.        Psoriasis to feet/hands   Neurological: Positive for dizziness. Negative for headaches.   Endo/Heme/Allergies: Does not bruise/bleed easily.   Psychiatric/Behavioral: Positive for depression. The patient is not nervous/anxious.         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 She lost her mom and her dog a few mos apart this yr. She is not interested in pharmacotherapy or counseling.         Medications:  Outpatient Encounter Medications as of 12/1/2020   Medication Sig Note Dispense Refill    acetic acid-hydrocortisone (VOSOL-HC) otic solution INT 5 GTS INTO AU 3 XD FOR 10 DAYS 1/2/2018: Received from: External Pharmacy  3    betamethasone valerate 0.1% (VALISONE) 0.1 % Oint        blood sugar diagnostic (BLOOD GLUCOSE TEST) Strp Accu-Chek Rashida Plus test strips       ergocalciferol (ERGOCALCIFEROL) 50,000 unit Cap        fluocinonide 0.05% (LIDEX) 0.05 % cream ARTEMIO EXT AA BID       metFORMIN (GLUCOPHAGE-XR) 500 MG 24 hr tablet TK 2 TS PO D   1    sulfacetamide sodium 10% (BLEPH-10) 10 % ophthalmic solution INSTILL 10 GTS AU TID FOR 7 DAYS 1/2/2018: Received from: External Pharmacy  5    triamcinolone acetonide 0.1% (KENALOG) 0.1 % cream    0    azelastine (ASTELIN) 137 mcg (0.1 %) nasal spray U 1 SPR IEN BID   0    ciprofloxacin HCl (CIPRO) 500 MG tablet Take 1 tablet (500 mg total) by mouth every 12 (twelve) hours. for 5 days  10 tablet 0     ciprofloxacin-dexamethasone 0.3-0.1% (CIPRODEX) 0.3-0.1 % DrpS Place 4 drops into both ears 2 (two) times daily. for 7 days  7.5 mL 0    prednisoLONE acetate (PRED FORTE) 1 % DrpS        [DISCONTINUED] albuterol 90 mcg/actuation inhaler Inhale 1-2 puffs into the lungs every 6 (six) hours as needed for Wheezing.  1 each 11     No facility-administered encounter medications on file as of 12/1/2020.        Current Outpatient Medications:     acetic acid-hydrocortisone (VOSOL-HC) otic solution, INT 5 GTS INTO AU 3 XD FOR 10 DAYS, Disp: , Rfl: 3    betamethasone valerate 0.1% (VALISONE) 0.1 % Oint, , Disp: , Rfl:     blood sugar diagnostic (BLOOD GLUCOSE TEST) Strp, Accu-Chek Rashida Plus test strips, Disp: , Rfl:     ergocalciferol (ERGOCALCIFEROL) 50,000 unit Cap, , Disp: , Rfl:     fluocinonide 0.05% (LIDEX) 0.05 % cream, ARTEMIO EXT AA BID, Disp: , Rfl:     metFORMIN (GLUCOPHAGE-XR) 500 MG 24 hr tablet, TK 2 TS PO D, Disp: , Rfl: 1    sulfacetamide sodium 10% (BLEPH-10) 10 % ophthalmic solution, INSTILL 10 GTS AU TID FOR 7 DAYS, Disp: , Rfl: 5    triamcinolone acetonide 0.1% (KENALOG) 0.1 % cream, , Disp: , Rfl: 0    azelastine (ASTELIN) 137 mcg (0.1 %) nasal spray, U 1 SPR IEN BID, Disp: , Rfl: 0    ciprofloxacin HCl (CIPRO) 500 MG tablet, Take 1 tablet (500 mg total) by mouth every 12 (twelve) hours. for 5 days, Disp: 10 tablet, Rfl: 0    ciprofloxacin-dexamethasone 0.3-0.1% (CIPRODEX) 0.3-0.1 % DrpS, Place 4 drops into both ears 2 (two) times daily. for 7 days, Disp: 7.5 mL, Rfl: 0    prednisoLONE acetate (PRED FORTE) 1 % DrpS, , Disp: , Rfl:     Allergies:  Review of patient's allergies indicates:   Allergen Reactions    Codeine Other (See Comments)     psychosis    Asparaginase - erwinia Other (See Comments)     Aspartame:   Headache        Health Maintenance:    There is no immunization history on file for this patient.   Health Maintenance   Topic Date Due    TETANUS VACCINE  03/17/1976    LDCT  "Lung Screen  03/17/2013    Urine Microalbumin  08/19/2015    Foot Exam  09/10/2015    Hemoglobin A1c  07/02/2018    Lipid Panel  03/19/2020    Mammogram  06/04/2021    Hepatitis C Screening  Completed      Objective:      Vital Signs  Temp: 98 °F (36.7 °C)  Pulse: 77  Resp: 18  SpO2: 99 %  BP: (P) 136/74  BP Location: (P) Left arm  Patient Position: (P) Sitting  Pain Score: 0-No pain  Height and Weight  Height: 5' 5" (165.1 cm)  Weight: 97.3 kg (214 lb 8.1 oz)  BSA (Calculated - sq m): 2.11 sq meters  BMI (Calculated): 35.7  Weight in (lb) to have BMI = 25: 149.9]    Laboratory:    A1C:  Recent Labs   Lab 01/02/18  1545   Hemoglobin A1C 13.7 H       Urine Microalbumin/Cr:  Lab Results   Component Value Date    MICALBCREAT Unable to calculate 08/19/2014       Other:   Lab Results   Component Value Date    FSH 22.7 12/17/2004     Lab Results   Component Value Date    HEPCAB Negative 08/26/2014       Physical Exam  Vitals signs reviewed.   Constitutional:       General: She is not in acute distress.     Appearance: Normal appearance. She is not ill-appearing, toxic-appearing or diaphoretic.   HENT:      Head: Normocephalic and atraumatic.      Left Ear: Tympanic membrane normal.      Ears:      Comments: RT ear canal swollen; Preauricular area swollen; Ext ear tender on manipulation; Unable to visualize TM  Eyes:      Extraocular Movements: Extraocular movements intact.      Conjunctiva/sclera: Conjunctivae normal.      Pupils: Pupils are equal, round, and reactive to light.      Comments: lianet cataracts   Neck:      Vascular: No carotid bruit.   Cardiovascular:      Rate and Rhythm: Normal rate and regular rhythm.      Pulses: Normal pulses.      Heart sounds: Normal heart sounds. No murmur.   Pulmonary:      Effort: Pulmonary effort is normal. No respiratory distress.      Breath sounds: No wheezing or rhonchi.      Comments: Dec bs throughout  Abdominal:      General: Bowel sounds are normal. There is no " distension.      Palpations: Abdomen is soft.      Tenderness: There is no abdominal tenderness. There is no guarding or rebound.   Skin:     General: Skin is warm and dry.   Neurological:      General: No focal deficit present.      Mental Status: She is oriented to person, place, and time.   Psychiatric:         Mood and Affect: Mood normal.         Behavior: Behavior normal.             Assessment:       1. Controlled type 2 diabetes mellitus without complication, without long-term current use of insulin    2. Acute otitis externa of right ear, unspecified type    3. Hyperlipidemia, unspecified hyperlipidemia type    4. Gastroesophageal reflux disease with esophagitis without hemorrhage    5. JANAE on CPAP    6. Vitamin D deficiency    7. Psoriasis    8. Class 2 severe obesity due to excess calories with serious comorbidity and body mass index (BMI) of 35.0 to 35.9 in adult    9. Screening mammogram, encounter for    10. Normal physical exam, routine        Linn Price is a 60 y.o.female with:    1. Controlled type 2 diabetes mellitus without complication, without long-term current use of insulin  - Will obtain recent labs for review.  May need to consider starting Ozempic or Jardiance if HA1c elevated.  Rec low carb diet.     2. Acute otitis externa of right ear, unspecified type  - ciprofloxacin HCl (CIPRO) 500 MG tablet; Take 1 tablet (500 mg total) by mouth every 12 (twelve) hours. for 5 days  Dispense: 10 tablet; Refill: 0  - ciprofloxacin-dexamethasone 0.3-0.1% (CIPRODEX) 0.3-0.1 % DrpS; Place 4 drops into both ears 2 (two) times daily. for 7 days  Dispense: 7.5 mL; Refill: 0  - Spoke with Dr. David.  Will Rx Cipro and Ciprodex.  Pt will fu with Dr. David.    3. Hyperlipidemia, unspecified hyperlipidemia type  - Will obtain recent labs for review.    4. Gastroesophageal reflux disease with esophagitis without hemorrhage  - Rec reflux prec.     5. JANAE on CPAP  - Rec diet and exercise as discussed for  wt loss.  Cont compliance with CPAP.     6. Vitamin D deficiency  - Cont Vit D suppl. Can repeat level in future.     7. Psoriasis  - Stable.  Cont current regimen. Fu by Derm.     8. Class 2 severe obesity due to excess calories with serious comorbidity and body mass index (BMI) of 35.0 to 35.9 in adult  - Rec diet and exercise as discussed for wt loss.     9. Pulmonary nodule  - Will obtain a copy of recent CT chest to review.     10. Screening mammogram, encounter for  - Mammo Digital Screening Bilat w/ Pranay; Future          Chronic conditions status updated as per HPI.  Other than changes above, cont current medications and maintain follow up with specialists.  Return to clinic in 6 wks or sooner if needed    Erika Mcclure MD  [facility]

## 2020-12-01 NOTE — PROGRESS NOTES
I sent pt a my chart message -  I reviewed your Mammogram - Wow that was quick!  The breasts are heterogeneously dense, which may obscure small masses. There is no evidence of suspicious masses, microcalcifications or architectural distortion.  Impression:  No mammographic evidence of malignancy.  Routine screening mammogram in 1 year is recommended.  Have a great evening,     Dr. YEAGER

## 2020-12-21 ENCOUNTER — PATIENT OUTREACH (OUTPATIENT)
Dept: ADMINISTRATIVE | Facility: HOSPITAL | Age: 60
End: 2020-12-21

## 2020-12-21 NOTE — LETTER
AUTHORIZATION FOR RELEASE OF   CONFIDENTIAL INFORMATION    Dear Dr. Swain,    We are seeing Linn Price, date of birth 1960, in the clinic at [facility]. Erika Mcclure MD is the patient's PCP. Linn Price has an outstanding lab/procedure at the time we reviewed her chart. In order to help keep her health information updated, she has authorized us to request the following medical record(s):        (  )  MAMMOGRAM                                      ( X )  COLONOSCOPY      (  )  PAP SMEAR                                          (  )  OUTSIDE LAB RESULTS     (  )  DEXA SCAN                                          (  )  EYE EXAM            (  )  FOOT EXAM                                          (  )  ENTIRE RECORD     (  )  OUTSIDE IMMUNIZATIONS                 (  )  _______________         Please fax records to [facility], Nicole Giambrone, MD, [phone]     If you have any questions, please contact Melissa at [phone]           Patient Name: Linn Price  : 1960  Patient Phone #: [phone]

## 2021-01-15 ENCOUNTER — TELEPHONE (OUTPATIENT)
Dept: PRIMARY CARE CLINIC | Facility: CLINIC | Age: 61
End: 2021-01-15

## 2021-01-15 ENCOUNTER — OFFICE VISIT (OUTPATIENT)
Dept: PRIMARY CARE CLINIC | Facility: CLINIC | Age: 61
End: 2021-01-15
Payer: MEDICAID

## 2021-01-15 VITALS
OXYGEN SATURATION: 99 % | HEIGHT: 65 IN | TEMPERATURE: 98 F | WEIGHT: 211.19 LBS | RESPIRATION RATE: 16 BRPM | BODY MASS INDEX: 35.18 KG/M2 | HEART RATE: 67 BPM

## 2021-01-15 DIAGNOSIS — E66.01 CLASS 2 SEVERE OBESITY DUE TO EXCESS CALORIES WITH SERIOUS COMORBIDITY AND BODY MASS INDEX (BMI) OF 35.0 TO 35.9 IN ADULT: ICD-10-CM

## 2021-01-15 DIAGNOSIS — E11.9 CONTROLLED TYPE 2 DIABETES MELLITUS WITHOUT COMPLICATION, WITHOUT LONG-TERM CURRENT USE OF INSULIN: Primary | ICD-10-CM

## 2021-01-15 DIAGNOSIS — E78.5 HYPERLIPIDEMIA, UNSPECIFIED HYPERLIPIDEMIA TYPE: ICD-10-CM

## 2021-01-15 DIAGNOSIS — L40.3 PALMOPLANTAR PUSTULOSIS: ICD-10-CM

## 2021-01-15 DIAGNOSIS — G47.33 OSA ON CPAP: ICD-10-CM

## 2021-01-15 DIAGNOSIS — E04.2 MULTINODULAR GOITER: ICD-10-CM

## 2021-01-15 PROCEDURE — 99214 OFFICE O/P EST MOD 30 MIN: CPT | Mod: PBBFAC,PN | Performed by: INTERNAL MEDICINE

## 2021-01-15 PROCEDURE — 99214 PR OFFICE/OUTPT VISIT, EST, LEVL IV, 30-39 MIN: ICD-10-PCS | Mod: S$PBB,,, | Performed by: INTERNAL MEDICINE

## 2021-01-15 PROCEDURE — 99999 PR PBB SHADOW E&M-EST. PATIENT-LVL IV: CPT | Mod: PBBFAC,,, | Performed by: INTERNAL MEDICINE

## 2021-01-15 PROCEDURE — 99214 OFFICE O/P EST MOD 30 MIN: CPT | Mod: S$PBB,,, | Performed by: INTERNAL MEDICINE

## 2021-01-15 PROCEDURE — 99999 PR PBB SHADOW E&M-EST. PATIENT-LVL IV: ICD-10-PCS | Mod: PBBFAC,,, | Performed by: INTERNAL MEDICINE

## 2021-01-20 ENCOUNTER — PATIENT OUTREACH (OUTPATIENT)
Dept: ADMINISTRATIVE | Facility: HOSPITAL | Age: 61
End: 2021-01-20

## 2021-01-25 ENCOUNTER — PATIENT OUTREACH (OUTPATIENT)
Dept: ADMINISTRATIVE | Facility: HOSPITAL | Age: 61
End: 2021-01-25

## 2021-01-29 ENCOUNTER — TELEPHONE (OUTPATIENT)
Dept: DERMATOLOGY | Facility: CLINIC | Age: 61
End: 2021-01-29

## 2021-02-22 ENCOUNTER — PATIENT OUTREACH (OUTPATIENT)
Dept: ADMINISTRATIVE | Facility: OTHER | Age: 61
End: 2021-02-22

## 2021-02-24 ENCOUNTER — OFFICE VISIT (OUTPATIENT)
Dept: DERMATOLOGY | Facility: CLINIC | Age: 61
End: 2021-02-24
Payer: MEDICAID

## 2021-02-24 DIAGNOSIS — L40.3 PALMOPLANTAR PUSTULAR PSORIASIS: Primary | ICD-10-CM

## 2021-02-24 PROCEDURE — 99999 PR PBB SHADOW E&M-EST. PATIENT-LVL III: CPT | Mod: PBBFAC,,, | Performed by: DERMATOLOGY

## 2021-02-24 PROCEDURE — 99204 PR OFFICE/OUTPT VISIT, NEW, LEVL IV, 45-59 MIN: ICD-10-PCS | Mod: S$PBB,,, | Performed by: DERMATOLOGY

## 2021-02-24 PROCEDURE — 99213 OFFICE O/P EST LOW 20 MIN: CPT | Mod: PBBFAC | Performed by: DERMATOLOGY

## 2021-02-24 PROCEDURE — 99999 PR PBB SHADOW E&M-EST. PATIENT-LVL III: ICD-10-PCS | Mod: PBBFAC,,, | Performed by: DERMATOLOGY

## 2021-02-24 PROCEDURE — 99204 OFFICE O/P NEW MOD 45 MIN: CPT | Mod: S$PBB,,, | Performed by: DERMATOLOGY

## 2021-02-24 RX ORDER — HALOBETASOL PROPIONATE 0.5 MG/G
OINTMENT TOPICAL 2 TIMES DAILY
Qty: 50 G | Refills: 2 | Status: SHIPPED | OUTPATIENT
Start: 2021-02-24 | End: 2021-03-31

## 2021-02-24 RX ORDER — CALCIPOTRIENE 50 UG/G
CREAM TOPICAL 2 TIMES DAILY
Qty: 60 G | Refills: 0 | Status: SHIPPED | OUTPATIENT
Start: 2021-02-24 | End: 2021-03-10

## 2021-03-12 LAB
ALBUMIN: 4.8 GRAM/DL (ref 3.5–5)
ALP SERPL-CCNC: 106 UNIT/L (ref 38–126)
ALT SERPL W P-5'-P-CCNC: 19 UNIT/L (ref 7–56)
ANION GAP SERPL CALC-SCNC: 19 MEQ/L (ref 9–18)
AST SERPL-CCNC: 19 UNIT/L (ref 7–40)
BILIRUB SERPL-MCNC: 0.4 MG/DL (ref 0–1.2)
BUN BLD-MCNC: 14 MG/DL (ref 7–21)
BUN/CREAT SERPL: 20 RATIO (ref 6–22)
CALC OSMOLALITY: 288 MOSM/KG (ref 275–295)
CALCIUM SERPL-MCNC: 10.2 MG/DL (ref 8.5–10.3)
CHLORIDE SERPL-SCNC: 102 MEQ/L (ref 98–107)
CHOL/HDLC RATIO: 4
CHOLEST SERPL-MSCNC: 202 MG/DL (ref 100–200)
CO2 SERPL-SCNC: 28 MEQ/L (ref 21–31)
CREAT SERPL-MCNC: 0.7 MG/DL (ref 0.5–1)
GFR: 80.8 ML/MIN/1.73M2
GLUCOSE SERPL-MCNC: 114 MG/DL (ref 70–100)
HBA1C MFR BLD: 8 % (ref 4.5–5.7)
HDLC SERPL-MCNC: 57 MG/DL (ref 40–75)
LDLC SERPL CALC-MCNC: 129 MG/DL (ref 0–125)
NONHDLC SERPL-MCNC: 145 MG/DL (ref 60–125)
POTASSIUM SERPL-SCNC: 4.9 MEQ/L (ref 3.5–5)
RANDOM URINE: 57.3 MG/DL
RANDOM URINE: <1.2 MG/DL
RANDOM URINE: <21 MCG/MG CREAT (ref 0–29)
SODIUM BLD-SCNC: 144 MEQ/L (ref 135–145)
TOTAL PROTEIN: 8.1 GRAM/DL (ref 6.3–8.2)
TRIGL SERPL-MCNC: 98 MG/DL (ref 30–150)

## 2021-03-15 DIAGNOSIS — E11.9 TYPE 2 DIABETES MELLITUS WITHOUT COMPLICATION, WITHOUT LONG-TERM CURRENT USE OF INSULIN: Primary | ICD-10-CM

## 2021-03-15 RX ORDER — LANCETS
EACH MISCELLANEOUS
Qty: 100 EACH | Refills: 1 | Status: SHIPPED | OUTPATIENT
Start: 2021-03-15 | End: 2021-09-12

## 2021-03-15 RX ORDER — ATORVASTATIN CALCIUM 20 MG/1
20 TABLET, FILM COATED ORAL DAILY
Qty: 90 TABLET | Refills: 0 | Status: SHIPPED | OUTPATIENT
Start: 2021-03-15 | End: 2021-06-11

## 2021-03-15 RX ORDER — INSULIN PUMP SYRINGE, 3 ML
EACH MISCELLANEOUS
Qty: 1 EACH | Refills: 0 | Status: SHIPPED | OUTPATIENT
Start: 2021-03-15 | End: 2022-03-15

## 2021-03-15 RX ORDER — METFORMIN HYDROCHLORIDE 500 MG/1
500 TABLET, EXTENDED RELEASE ORAL DAILY
Qty: 90 TABLET | Refills: 0 | Status: SHIPPED | OUTPATIENT
Start: 2021-03-15 | End: 2021-05-06 | Stop reason: SDUPTHER

## 2021-03-22 ENCOUNTER — DOCUMENTATION ONLY (OUTPATIENT)
Dept: DERMATOLOGY | Facility: CLINIC | Age: 61
End: 2021-03-22

## 2021-03-30 ENCOUNTER — PATIENT MESSAGE (OUTPATIENT)
Dept: DERMATOLOGY | Facility: CLINIC | Age: 61
End: 2021-03-30

## 2021-03-30 DIAGNOSIS — L40.3 PALMOPLANTAR PUSTULAR PSORIASIS: ICD-10-CM

## 2021-03-31 ENCOUNTER — PATIENT MESSAGE (OUTPATIENT)
Dept: DERMATOLOGY | Facility: CLINIC | Age: 61
End: 2021-03-31

## 2021-03-31 RX ORDER — HALOBETASOL PROPIONATE 0.5 MG/G
OINTMENT TOPICAL 2 TIMES DAILY
Qty: 50 G | Refills: 2 | Status: SHIPPED | OUTPATIENT
Start: 2021-03-31 | End: 2021-05-12 | Stop reason: SDUPTHER

## 2021-03-31 RX ORDER — CALCIPOTRIENE 50 UG/G
CREAM TOPICAL
Qty: 60 G | Refills: 2 | Status: SHIPPED | OUTPATIENT
Start: 2021-03-31 | End: 2021-07-15

## 2021-04-05 ENCOUNTER — TELEPHONE (OUTPATIENT)
Dept: DERMATOLOGY | Facility: CLINIC | Age: 61
End: 2021-04-05

## 2021-04-07 ENCOUNTER — TELEPHONE (OUTPATIENT)
Dept: DERMATOLOGY | Facility: CLINIC | Age: 61
End: 2021-04-07

## 2021-04-07 ENCOUNTER — PATIENT MESSAGE (OUTPATIENT)
Dept: PRIMARY CARE CLINIC | Facility: CLINIC | Age: 61
End: 2021-04-07

## 2021-04-07 DIAGNOSIS — L40.3 PALMOPLANTAR PUSTULAR PSORIASIS: Primary | ICD-10-CM

## 2021-04-07 DIAGNOSIS — G47.33 OSA ON CPAP: Primary | ICD-10-CM

## 2021-04-07 RX ORDER — CLOBETASOL PROPIONATE 0.5 MG/G
OINTMENT TOPICAL
Qty: 60 G | Refills: 3 | Status: SHIPPED | OUTPATIENT
Start: 2021-04-07

## 2021-04-08 ENCOUNTER — TELEPHONE (OUTPATIENT)
Dept: DERMATOLOGY | Facility: CLINIC | Age: 61
End: 2021-04-08

## 2021-04-21 ENCOUNTER — TELEPHONE (OUTPATIENT)
Dept: DERMATOLOGY | Facility: CLINIC | Age: 61
End: 2021-04-21

## 2021-04-22 ENCOUNTER — DOCUMENTATION ONLY (OUTPATIENT)
Dept: DERMATOLOGY | Facility: CLINIC | Age: 61
End: 2021-04-22

## 2021-05-02 LAB
BUN SERPL-MCNC: 19 MG/DL (ref 7–25)
BUN/CREAT SERPL: ABNORMAL (CALC) (ref 6–22)
CALCIUM SERPL-MCNC: 9.3 MG/DL (ref 8.6–10.4)
CHLORIDE SERPL-SCNC: 104 MMOL/L (ref 98–110)
CO2 SERPL-SCNC: 27 MMOL/L (ref 20–32)
CREAT SERPL-MCNC: 0.73 MG/DL (ref 0.5–0.99)
GLUCOSE SERPL-MCNC: 123 MG/DL (ref 65–99)
POTASSIUM SERPL-SCNC: 4.7 MMOL/L (ref 3.5–5.3)
SODIUM SERPL-SCNC: 139 MMOL/L (ref 135–146)

## 2021-05-05 ENCOUNTER — TELEPHONE (OUTPATIENT)
Dept: PRIMARY CARE CLINIC | Facility: CLINIC | Age: 61
End: 2021-05-05

## 2021-05-06 ENCOUNTER — PATIENT MESSAGE (OUTPATIENT)
Dept: PRIMARY CARE CLINIC | Facility: CLINIC | Age: 61
End: 2021-05-06

## 2021-05-06 ENCOUNTER — OFFICE VISIT (OUTPATIENT)
Dept: PRIMARY CARE CLINIC | Facility: CLINIC | Age: 61
End: 2021-05-06
Payer: MEDICAID

## 2021-05-06 DIAGNOSIS — E11.59 HYPERTENSION ASSOCIATED WITH DIABETES: ICD-10-CM

## 2021-05-06 DIAGNOSIS — E78.5 HYPERLIPIDEMIA, UNSPECIFIED HYPERLIPIDEMIA TYPE: ICD-10-CM

## 2021-05-06 DIAGNOSIS — E04.2 MULTINODULAR GOITER: ICD-10-CM

## 2021-05-06 DIAGNOSIS — I15.2 HYPERTENSION ASSOCIATED WITH DIABETES: ICD-10-CM

## 2021-05-06 DIAGNOSIS — R91.1 PULMONARY NODULE: ICD-10-CM

## 2021-05-06 DIAGNOSIS — E11.9 TYPE 2 DIABETES MELLITUS WITHOUT COMPLICATION, WITHOUT LONG-TERM CURRENT USE OF INSULIN: Primary | ICD-10-CM

## 2021-05-06 DIAGNOSIS — G47.33 OSA ON CPAP: ICD-10-CM

## 2021-05-06 DIAGNOSIS — Z91.018 FOOD ALLERGY: ICD-10-CM

## 2021-05-06 DIAGNOSIS — Z12.2 ENCOUNTER FOR SCREENING FOR MALIGNANT NEOPLASM OF RESPIRATORY ORGANS: ICD-10-CM

## 2021-05-06 DIAGNOSIS — K63.5 POLYP OF COLON, UNSPECIFIED PART OF COLON, UNSPECIFIED TYPE: ICD-10-CM

## 2021-05-06 PROCEDURE — 99214 OFFICE O/P EST MOD 30 MIN: CPT | Mod: 95,,, | Performed by: INTERNAL MEDICINE

## 2021-05-06 PROCEDURE — 99214 PR OFFICE/OUTPT VISIT, EST, LEVL IV, 30-39 MIN: ICD-10-PCS | Mod: 95,,, | Performed by: INTERNAL MEDICINE

## 2021-05-06 RX ORDER — METFORMIN HYDROCHLORIDE 500 MG/1
500 TABLET, EXTENDED RELEASE ORAL DAILY
Qty: 90 TABLET | Refills: 0 | Status: SHIPPED | OUTPATIENT
Start: 2021-05-06 | End: 2021-07-15 | Stop reason: SDUPTHER

## 2021-05-06 RX ORDER — EPINEPHRINE 0.3 MG/.3ML
1 INJECTION SUBCUTANEOUS ONCE
Qty: 2 EACH | Refills: 0 | Status: SHIPPED | OUTPATIENT
Start: 2021-05-06 | End: 2022-06-23 | Stop reason: SDUPTHER

## 2021-05-11 ENCOUNTER — PATIENT OUTREACH (OUTPATIENT)
Dept: ADMINISTRATIVE | Facility: OTHER | Age: 61
End: 2021-05-11

## 2021-05-12 ENCOUNTER — OFFICE VISIT (OUTPATIENT)
Dept: DERMATOLOGY | Facility: CLINIC | Age: 61
End: 2021-05-12
Payer: MEDICAID

## 2021-05-12 DIAGNOSIS — L40.3 PALMOPLANTAR PUSTULAR PSORIASIS: ICD-10-CM

## 2021-05-12 PROCEDURE — 99214 OFFICE O/P EST MOD 30 MIN: CPT | Mod: 95,,, | Performed by: DERMATOLOGY

## 2021-05-12 PROCEDURE — 99214 PR OFFICE/OUTPT VISIT, EST, LEVL IV, 30-39 MIN: ICD-10-PCS | Mod: 95,,, | Performed by: DERMATOLOGY

## 2021-05-12 RX ORDER — CALCIPOTRIENE 50 UG/G
CREAM TOPICAL 2 TIMES DAILY
Qty: 120 G | Refills: 3 | Status: SHIPPED | OUTPATIENT
Start: 2021-05-12 | End: 2023-09-29

## 2021-05-12 RX ORDER — HALOBETASOL PROPIONATE 0.5 MG/G
OINTMENT TOPICAL 2 TIMES DAILY
Qty: 100 G | Refills: 3 | Status: SHIPPED | OUTPATIENT
Start: 2021-05-12 | End: 2022-06-23

## 2021-05-14 ENCOUNTER — PATIENT MESSAGE (OUTPATIENT)
Dept: DERMATOLOGY | Facility: CLINIC | Age: 61
End: 2021-05-14

## 2021-05-14 ENCOUNTER — CLINICAL SUPPORT (OUTPATIENT)
Dept: PRIMARY CARE CLINIC | Facility: CLINIC | Age: 61
End: 2021-05-14
Payer: MEDICAID

## 2021-05-14 ENCOUNTER — PATIENT MESSAGE (OUTPATIENT)
Dept: PRIMARY CARE CLINIC | Facility: CLINIC | Age: 61
End: 2021-05-14

## 2021-05-14 VITALS — SYSTOLIC BLOOD PRESSURE: 140 MMHG | DIASTOLIC BLOOD PRESSURE: 78 MMHG

## 2021-05-14 DIAGNOSIS — I15.2 HYPERTENSION ASSOCIATED WITH DIABETES: ICD-10-CM

## 2021-05-14 DIAGNOSIS — Z01.30 BLOOD PRESSURE CHECK: Primary | ICD-10-CM

## 2021-05-14 DIAGNOSIS — E11.59 HYPERTENSION ASSOCIATED WITH DIABETES: ICD-10-CM

## 2021-05-14 PROCEDURE — 99999 PR PBB SHADOW E&M-EST. PATIENT-LVL I: ICD-10-PCS | Mod: PBBFAC,,,

## 2021-05-14 PROCEDURE — 99999 PR PBB SHADOW E&M-EST. PATIENT-LVL I: CPT | Mod: PBBFAC,,,

## 2021-05-14 PROCEDURE — 99211 OFF/OP EST MAY X REQ PHY/QHP: CPT | Mod: PBBFAC,PN

## 2021-05-14 RX ORDER — AMLODIPINE BESYLATE 2.5 MG/1
2.5 TABLET ORAL DAILY
Qty: 30 TABLET | Refills: 1 | Status: SHIPPED | OUTPATIENT
Start: 2021-05-14 | End: 2021-07-06

## 2021-05-20 LAB
LEFT EYE DM RETINOPATHY: NEGATIVE
RIGHT EYE DM RETINOPATHY: NEGATIVE

## 2021-05-24 ENCOUNTER — HOSPITAL ENCOUNTER (OUTPATIENT)
Dept: RADIOLOGY | Facility: HOSPITAL | Age: 61
Discharge: HOME | End: 2021-05-24
Attending: INTERNAL MEDICINE
Payer: MEDICAID

## 2021-05-24 DIAGNOSIS — Z12.2 ENCOUNTER FOR SCREENING FOR MALIGNANT NEOPLASM OF RESPIRATORY ORGANS: ICD-10-CM

## 2021-05-24 DIAGNOSIS — E04.2 MULTINODULAR GOITER: ICD-10-CM

## 2021-05-24 PROCEDURE — 76536 US EXAM OF HEAD AND NECK: CPT | Mod: 26,,, | Performed by: RADIOLOGY

## 2021-05-24 PROCEDURE — 71271 CT CHEST LUNG SCREENING LOW DOSE: ICD-10-PCS | Mod: 26,,, | Performed by: RADIOLOGY

## 2021-05-24 PROCEDURE — 71271 CT THORAX LUNG CANCER SCR C-: CPT | Mod: TC

## 2021-05-24 PROCEDURE — 76536 US EXAM OF HEAD AND NECK: CPT | Mod: TC

## 2021-05-24 PROCEDURE — 71271 CT THORAX LUNG CANCER SCR C-: CPT | Mod: 26,,, | Performed by: RADIOLOGY

## 2021-05-24 PROCEDURE — 76536 US SOFT TISSUE HEAD NECK THYROID: ICD-10-PCS | Mod: 26,,, | Performed by: RADIOLOGY

## 2021-06-01 ENCOUNTER — PATIENT OUTREACH (OUTPATIENT)
Dept: ADMINISTRATIVE | Facility: HOSPITAL | Age: 61
End: 2021-06-01

## 2021-06-02 ENCOUNTER — PATIENT OUTREACH (OUTPATIENT)
Dept: ADMINISTRATIVE | Facility: HOSPITAL | Age: 61
End: 2021-06-02

## 2021-06-03 ENCOUNTER — OFFICE VISIT (OUTPATIENT)
Dept: ALLERGY | Facility: CLINIC | Age: 61
End: 2021-06-03
Payer: MEDICAID

## 2021-06-03 VITALS — BODY MASS INDEX: 35.01 KG/M2 | HEIGHT: 65 IN | WEIGHT: 210.13 LBS

## 2021-06-03 DIAGNOSIS — R21 RASH: Primary | ICD-10-CM

## 2021-06-03 DIAGNOSIS — H57.9 EYE PROBLEM: ICD-10-CM

## 2021-06-03 PROCEDURE — 99215 OFFICE O/P EST HI 40 MIN: CPT | Mod: PBBFAC | Performed by: STUDENT IN AN ORGANIZED HEALTH CARE EDUCATION/TRAINING PROGRAM

## 2021-06-03 PROCEDURE — 99204 OFFICE O/P NEW MOD 45 MIN: CPT | Mod: S$PBB,,, | Performed by: STUDENT IN AN ORGANIZED HEALTH CARE EDUCATION/TRAINING PROGRAM

## 2021-06-03 PROCEDURE — 99999 PR PBB SHADOW E&M-EST. PATIENT-LVL V: ICD-10-PCS | Mod: PBBFAC,,, | Performed by: STUDENT IN AN ORGANIZED HEALTH CARE EDUCATION/TRAINING PROGRAM

## 2021-06-03 PROCEDURE — 99204 PR OFFICE/OUTPT VISIT, NEW, LEVL IV, 45-59 MIN: ICD-10-PCS | Mod: S$PBB,,, | Performed by: STUDENT IN AN ORGANIZED HEALTH CARE EDUCATION/TRAINING PROGRAM

## 2021-06-03 PROCEDURE — 99999 PR PBB SHADOW E&M-EST. PATIENT-LVL V: CPT | Mod: PBBFAC,,, | Performed by: STUDENT IN AN ORGANIZED HEALTH CARE EDUCATION/TRAINING PROGRAM

## 2021-06-03 RX ORDER — CLOBETASOL PROPIONATE 0.5 MG/G
CREAM TOPICAL
COMMUNITY
Start: 2021-01-12 | End: 2021-07-15

## 2021-06-03 RX ORDER — LANCETS 33 GAUGE
EACH MISCELLANEOUS
COMMUNITY
Start: 2021-03-15

## 2021-06-03 RX ORDER — LANCETS 30 GAUGE
EACH MISCELLANEOUS
COMMUNITY
Start: 2021-03-15

## 2021-06-03 RX ORDER — TRIAMCINOLONE ACETONIDE 1 MG/G
OINTMENT TOPICAL
COMMUNITY
Start: 2021-05-25 | End: 2023-06-22

## 2021-06-03 RX ORDER — DOXYCYCLINE 100 MG/1
100 CAPSULE ORAL 2 TIMES DAILY
COMMUNITY
Start: 2021-05-25 | End: 2022-06-23

## 2021-06-03 RX ORDER — BETAMETHASONE VALERATE 1.2 MG/G
OINTMENT TOPICAL
COMMUNITY
Start: 2021-06-02 | End: 2021-08-06

## 2021-06-08 ENCOUNTER — TELEPHONE (OUTPATIENT)
Dept: RHEUMATOLOGY | Facility: CLINIC | Age: 61
End: 2021-06-08

## 2021-06-09 ENCOUNTER — PATIENT MESSAGE (OUTPATIENT)
Dept: ADMINISTRATIVE | Facility: OTHER | Age: 61
End: 2021-06-09

## 2021-06-09 ENCOUNTER — PATIENT MESSAGE (OUTPATIENT)
Dept: PRIMARY CARE CLINIC | Facility: CLINIC | Age: 61
End: 2021-06-09

## 2021-06-09 DIAGNOSIS — E78.5 HYPERLIPIDEMIA, UNSPECIFIED HYPERLIPIDEMIA TYPE: Primary | ICD-10-CM

## 2021-06-28 ENCOUNTER — PATIENT OUTREACH (OUTPATIENT)
Dept: ADMINISTRATIVE | Facility: OTHER | Age: 61
End: 2021-06-28

## 2021-06-29 ENCOUNTER — OFFICE VISIT (OUTPATIENT)
Dept: RHEUMATOLOGY | Facility: CLINIC | Age: 61
End: 2021-06-29
Payer: MEDICAID

## 2021-06-29 ENCOUNTER — TELEPHONE (OUTPATIENT)
Dept: ADMINISTRATIVE | Facility: HOSPITAL | Age: 61
End: 2021-06-29

## 2021-06-29 VITALS
BODY MASS INDEX: 34.46 KG/M2 | WEIGHT: 206.81 LBS | HEIGHT: 65 IN | DIASTOLIC BLOOD PRESSURE: 87 MMHG | HEART RATE: 84 BPM | SYSTOLIC BLOOD PRESSURE: 149 MMHG

## 2021-06-29 DIAGNOSIS — H57.9 EYE PROBLEM: ICD-10-CM

## 2021-06-29 DIAGNOSIS — L40.3 PALMOPLANTAR PUSTULOSIS: Primary | ICD-10-CM

## 2021-06-29 PROCEDURE — 99999 PR PBB SHADOW E&M-EST. PATIENT-LVL V: ICD-10-PCS | Mod: PBBFAC,,, | Performed by: INTERNAL MEDICINE

## 2021-06-29 PROCEDURE — 99999 PR PBB SHADOW E&M-EST. PATIENT-LVL V: CPT | Mod: PBBFAC,,, | Performed by: INTERNAL MEDICINE

## 2021-06-29 PROCEDURE — 99204 PR OFFICE/OUTPT VISIT, NEW, LEVL IV, 45-59 MIN: ICD-10-PCS | Mod: S$PBB,,, | Performed by: INTERNAL MEDICINE

## 2021-06-29 PROCEDURE — 99204 OFFICE O/P NEW MOD 45 MIN: CPT | Mod: S$PBB,,, | Performed by: INTERNAL MEDICINE

## 2021-06-29 PROCEDURE — 99215 OFFICE O/P EST HI 40 MIN: CPT | Mod: PBBFAC | Performed by: INTERNAL MEDICINE

## 2021-06-29 RX ORDER — AMOXICILLIN 500 MG
2 CAPSULE ORAL DAILY
COMMUNITY
End: 2022-01-21

## 2021-07-06 ENCOUNTER — LAB VISIT (OUTPATIENT)
Dept: LAB | Facility: HOSPITAL | Age: 61
End: 2021-07-06
Attending: OBSTETRICS & GYNECOLOGY
Payer: MEDICAID

## 2021-07-06 ENCOUNTER — OFFICE VISIT (OUTPATIENT)
Dept: OBSTETRICS AND GYNECOLOGY | Facility: CLINIC | Age: 61
End: 2021-07-06
Attending: OBSTETRICS & GYNECOLOGY
Payer: MEDICAID

## 2021-07-06 VITALS
WEIGHT: 202.81 LBS | HEIGHT: 65 IN | DIASTOLIC BLOOD PRESSURE: 84 MMHG | BODY MASS INDEX: 33.79 KG/M2 | SYSTOLIC BLOOD PRESSURE: 150 MMHG

## 2021-07-06 DIAGNOSIS — R53.83 FATIGUE, UNSPECIFIED TYPE: ICD-10-CM

## 2021-07-06 DIAGNOSIS — Z11.51 ENCOUNTER FOR SCREENING FOR HUMAN PAPILLOMAVIRUS (HPV): ICD-10-CM

## 2021-07-06 DIAGNOSIS — Z78.0 MENOPAUSE: ICD-10-CM

## 2021-07-06 DIAGNOSIS — Z01.419 ENCOUNTER FOR GYNECOLOGICAL EXAMINATION: Primary | ICD-10-CM

## 2021-07-06 DIAGNOSIS — Z12.4 ENCOUNTER FOR PAPANICOLAOU SMEAR FOR CERVICAL CANCER SCREENING: ICD-10-CM

## 2021-07-06 PROCEDURE — 83540 ASSAY OF IRON: CPT | Performed by: OBSTETRICS & GYNECOLOGY

## 2021-07-06 PROCEDURE — 82728 ASSAY OF FERRITIN: CPT | Performed by: OBSTETRICS & GYNECOLOGY

## 2021-07-06 PROCEDURE — 88175 CYTOPATH C/V AUTO FLUID REDO: CPT | Performed by: OBSTETRICS & GYNECOLOGY

## 2021-07-06 PROCEDURE — 99999 PR PBB SHADOW E&M-EST. PATIENT-LVL V: CPT | Mod: PBBFAC,,, | Performed by: OBSTETRICS & GYNECOLOGY

## 2021-07-06 PROCEDURE — 82306 VITAMIN D 25 HYDROXY: CPT | Performed by: OBSTETRICS & GYNECOLOGY

## 2021-07-06 PROCEDURE — 99215 OFFICE O/P EST HI 40 MIN: CPT | Mod: PBBFAC,PN | Performed by: OBSTETRICS & GYNECOLOGY

## 2021-07-06 PROCEDURE — 99999 PR PBB SHADOW E&M-EST. PATIENT-LVL V: ICD-10-PCS | Mod: PBBFAC,,, | Performed by: OBSTETRICS & GYNECOLOGY

## 2021-07-06 PROCEDURE — 99396 PREV VISIT EST AGE 40-64: CPT | Mod: S$PBB,,, | Performed by: OBSTETRICS & GYNECOLOGY

## 2021-07-06 PROCEDURE — 87624 HPV HI-RISK TYP POOLED RSLT: CPT | Performed by: OBSTETRICS & GYNECOLOGY

## 2021-07-06 PROCEDURE — 99396 PR PREVENTIVE VISIT,EST,40-64: ICD-10-PCS | Mod: S$PBB,,, | Performed by: OBSTETRICS & GYNECOLOGY

## 2021-07-06 RX ORDER — ASCORBIC ACID 125 MG
250 TABLET,CHEWABLE ORAL
COMMUNITY
Start: 2021-07-01 | End: 2022-01-21

## 2021-07-07 LAB
25(OH)D3+25(OH)D2 SERPL-MCNC: 30 NG/ML (ref 30–96)
FERRITIN SERPL-MCNC: 121 NG/ML (ref 20–300)
IRON SERPL-MCNC: 38 UG/DL (ref 30–160)
SATURATED IRON: 9 % (ref 20–50)
TOTAL IRON BINDING CAPACITY: 414 UG/DL (ref 250–450)
TRANSFERRIN SERPL-MCNC: 280 MG/DL (ref 200–375)

## 2021-07-12 LAB
CHOL/HDLC RATIO: 3
CHOLEST SERPL-MSCNC: 175 MG/DL (ref 100–200)
CLINICAL INFO: NORMAL
CYTO CVX: NORMAL
CYTOLOGIST CVX/VAG CYTO: NORMAL
CYTOLOGY CMNT CVX/VAG CYTO-IMP: NORMAL
CYTOLOGY PAP THIN PREP EXPLANATION: NORMAL
DATE OF PREVIOUS PAP: NO
DATE PREVIOUS BX: NO
HBA1C MFR BLD: 6 % (ref 4.5–5.7)
HDLC SERPL-MCNC: 62 MG/DL (ref 40–75)
HPV I/H RISK 4 DNA CVX QL NAA+PROBE: NOT DETECTED
LDLC SERPL CALC-MCNC: 100 MG/DL (ref 0–125)
LMP START DATE: NORMAL
NONHDLC SERPL-MCNC: 113 MG/DL (ref 60–125)
RANDOM URINE: 153.7 MG/DL
RANDOM URINE: <1.2 MG/DL
RANDOM URINE: <8 MCG/MG CREAT (ref 0–29)
SPECIMEN SOURCE CVX/VAG CYTO: NORMAL
STAT OF ADQ CVX/VAG CYTO-IMP: NORMAL
TRIGL SERPL-MCNC: 82 MG/DL (ref 30–150)

## 2021-07-15 ENCOUNTER — PATIENT MESSAGE (OUTPATIENT)
Dept: PRIMARY CARE CLINIC | Facility: CLINIC | Age: 61
End: 2021-07-15

## 2021-07-15 ENCOUNTER — TELEPHONE (OUTPATIENT)
Dept: NEUROLOGY | Facility: HOSPITAL | Age: 61
End: 2021-07-15

## 2021-07-15 ENCOUNTER — OFFICE VISIT (OUTPATIENT)
Dept: PRIMARY CARE CLINIC | Facility: CLINIC | Age: 61
End: 2021-07-15
Payer: MEDICAID

## 2021-07-15 ENCOUNTER — TELEPHONE (OUTPATIENT)
Dept: PRIMARY CARE CLINIC | Facility: CLINIC | Age: 61
End: 2021-07-15

## 2021-07-15 ENCOUNTER — PATIENT OUTREACH (OUTPATIENT)
Dept: ADMINISTRATIVE | Facility: HOSPITAL | Age: 61
End: 2021-07-15

## 2021-07-15 VITALS
SYSTOLIC BLOOD PRESSURE: 145 MMHG | TEMPERATURE: 98 F | BODY MASS INDEX: 33.57 KG/M2 | WEIGHT: 201.5 LBS | HEART RATE: 68 BPM | RESPIRATION RATE: 19 BRPM | HEIGHT: 65 IN | OXYGEN SATURATION: 98 % | DIASTOLIC BLOOD PRESSURE: 76 MMHG

## 2021-07-15 DIAGNOSIS — Z12.11 COLON CANCER SCREENING: ICD-10-CM

## 2021-07-15 DIAGNOSIS — I15.2 HYPERTENSION ASSOCIATED WITH DIABETES: ICD-10-CM

## 2021-07-15 DIAGNOSIS — G47.33 OSA ON CPAP: ICD-10-CM

## 2021-07-15 DIAGNOSIS — E11.59 HYPERTENSION ASSOCIATED WITH DIABETES: ICD-10-CM

## 2021-07-15 DIAGNOSIS — R91.8 MULTIPLE PULMONARY NODULES: ICD-10-CM

## 2021-07-15 DIAGNOSIS — L30.1 DYSHIDROTIC ECZEMA: ICD-10-CM

## 2021-07-15 DIAGNOSIS — E66.09 CLASS 1 OBESITY DUE TO EXCESS CALORIES WITH SERIOUS COMORBIDITY AND BODY MASS INDEX (BMI) OF 33.0 TO 33.9 IN ADULT: ICD-10-CM

## 2021-07-15 DIAGNOSIS — E78.5 HYPERLIPIDEMIA, UNSPECIFIED HYPERLIPIDEMIA TYPE: ICD-10-CM

## 2021-07-15 DIAGNOSIS — E11.9 TYPE 2 DIABETES MELLITUS WITHOUT COMPLICATION, WITHOUT LONG-TERM CURRENT USE OF INSULIN: Primary | ICD-10-CM

## 2021-07-15 PROCEDURE — 99999 PR PBB SHADOW E&M-EST. PATIENT-LVL V: ICD-10-PCS | Mod: PBBFAC,,, | Performed by: INTERNAL MEDICINE

## 2021-07-15 PROCEDURE — 99214 PR OFFICE/OUTPT VISIT, EST, LEVL IV, 30-39 MIN: ICD-10-PCS | Mod: S$PBB,,, | Performed by: INTERNAL MEDICINE

## 2021-07-15 PROCEDURE — 99999 PR PBB SHADOW E&M-EST. PATIENT-LVL V: CPT | Mod: PBBFAC,,, | Performed by: INTERNAL MEDICINE

## 2021-07-15 PROCEDURE — 99214 OFFICE O/P EST MOD 30 MIN: CPT | Mod: S$PBB,,, | Performed by: INTERNAL MEDICINE

## 2021-07-15 PROCEDURE — 99215 OFFICE O/P EST HI 40 MIN: CPT | Mod: PBBFAC,PN | Performed by: INTERNAL MEDICINE

## 2021-07-15 RX ORDER — PIMECROLIMUS 10 MG/G
CREAM TOPICAL
COMMUNITY
Start: 2021-07-14 | End: 2023-06-22

## 2021-07-15 RX ORDER — METFORMIN HYDROCHLORIDE 500 MG/1
500 TABLET, EXTENDED RELEASE ORAL DAILY
Qty: 90 TABLET | Refills: 0 | Status: SHIPPED | OUTPATIENT
Start: 2021-07-15 | End: 2021-12-01 | Stop reason: SDUPTHER

## 2021-07-20 ENCOUNTER — PATIENT MESSAGE (OUTPATIENT)
Dept: PRIMARY CARE CLINIC | Facility: CLINIC | Age: 61
End: 2021-07-20

## 2021-07-23 ENCOUNTER — APPOINTMENT (OUTPATIENT)
Dept: RADIOLOGY | Facility: CLINIC | Age: 61
End: 2021-07-23
Attending: OBSTETRICS & GYNECOLOGY
Payer: MEDICAID

## 2021-07-23 DIAGNOSIS — Z78.0 MENOPAUSE: ICD-10-CM

## 2021-07-23 PROCEDURE — 77080 DEXA BONE DENSITY SPINE HIP: ICD-10-PCS | Mod: 26,,, | Performed by: INTERNAL MEDICINE

## 2021-07-23 PROCEDURE — 77080 DXA BONE DENSITY AXIAL: CPT | Mod: 26,,, | Performed by: INTERNAL MEDICINE

## 2021-07-23 PROCEDURE — 77080 DXA BONE DENSITY AXIAL: CPT | Mod: TC,PO

## 2021-07-29 ENCOUNTER — PATIENT MESSAGE (OUTPATIENT)
Dept: PRIMARY CARE CLINIC | Facility: CLINIC | Age: 61
End: 2021-07-29

## 2021-07-29 DIAGNOSIS — L50.9 HIVES: Primary | ICD-10-CM

## 2021-07-30 ENCOUNTER — PATIENT MESSAGE (OUTPATIENT)
Dept: ADMINISTRATIVE | Facility: OTHER | Age: 61
End: 2021-07-30

## 2021-08-06 ENCOUNTER — OFFICE VISIT (OUTPATIENT)
Dept: NEUROLOGY | Facility: HOSPITAL | Age: 61
End: 2021-08-06
Payer: MEDICAID

## 2021-08-06 VITALS
HEART RATE: 62 BPM | HEIGHT: 65 IN | WEIGHT: 198.31 LBS | DIASTOLIC BLOOD PRESSURE: 73 MMHG | SYSTOLIC BLOOD PRESSURE: 142 MMHG | BODY MASS INDEX: 33.04 KG/M2 | TEMPERATURE: 98 F

## 2021-08-06 DIAGNOSIS — K63.5 POLYP OF COLON, UNSPECIFIED PART OF COLON, UNSPECIFIED TYPE: Primary | ICD-10-CM

## 2021-08-06 PROCEDURE — 99215 OFFICE O/P EST HI 40 MIN: CPT | Performed by: PHYSICIAN ASSISTANT

## 2021-11-29 ENCOUNTER — HOSPITAL ENCOUNTER (OUTPATIENT)
Dept: RADIOLOGY | Facility: HOSPITAL | Age: 61
Discharge: HOME | End: 2021-11-29
Attending: INTERNAL MEDICINE
Payer: MEDICAID

## 2021-11-29 DIAGNOSIS — R91.8 MULTIPLE PULMONARY NODULES: ICD-10-CM

## 2021-11-29 PROCEDURE — 71250 CT THORAX DX C-: CPT | Mod: 26,,, | Performed by: RADIOLOGY

## 2021-11-29 PROCEDURE — 71250 CT CHEST WITHOUT CONTRAST: ICD-10-PCS | Mod: 26,,, | Performed by: RADIOLOGY

## 2021-11-29 PROCEDURE — 71250 CT THORAX DX C-: CPT | Mod: TC

## 2021-12-01 ENCOUNTER — TELEPHONE (OUTPATIENT)
Dept: PRIMARY CARE CLINIC | Facility: CLINIC | Age: 61
End: 2021-12-01
Payer: MEDICAID

## 2021-12-01 DIAGNOSIS — E11.9 TYPE 2 DIABETES MELLITUS WITHOUT COMPLICATION, WITHOUT LONG-TERM CURRENT USE OF INSULIN: ICD-10-CM

## 2021-12-01 RX ORDER — METFORMIN HYDROCHLORIDE 500 MG/1
500 TABLET, EXTENDED RELEASE ORAL DAILY
Qty: 90 TABLET | Refills: 0 | Status: SHIPPED | OUTPATIENT
Start: 2021-12-01 | End: 2021-12-14

## 2021-12-07 ENCOUNTER — PATIENT MESSAGE (OUTPATIENT)
Dept: OTHER | Facility: OTHER | Age: 61
End: 2021-12-07
Payer: MEDICAID

## 2021-12-22 ENCOUNTER — PATIENT MESSAGE (OUTPATIENT)
Dept: PRIMARY CARE CLINIC | Facility: CLINIC | Age: 61
End: 2021-12-22
Payer: MEDICAID

## 2021-12-22 DIAGNOSIS — E11.9 CONTROLLED TYPE 2 DIABETES MELLITUS WITHOUT COMPLICATION, WITHOUT LONG-TERM CURRENT USE OF INSULIN: Primary | ICD-10-CM

## 2021-12-30 ENCOUNTER — PATIENT MESSAGE (OUTPATIENT)
Dept: PRIMARY CARE CLINIC | Facility: CLINIC | Age: 61
End: 2021-12-30
Payer: MEDICAID

## 2022-01-04 ENCOUNTER — PATIENT MESSAGE (OUTPATIENT)
Dept: OTHER | Facility: OTHER | Age: 62
End: 2022-01-04
Payer: MEDICAID

## 2022-01-05 ENCOUNTER — TELEPHONE (OUTPATIENT)
Dept: PRIMARY CARE CLINIC | Facility: CLINIC | Age: 62
End: 2022-01-05
Payer: MEDICAID

## 2022-01-05 NOTE — TELEPHONE ENCOUNTER
----- Message from Erika Mcclure MD sent at 1/4/2022  5:56 AM CST -----  So this pt called to reschedule appt today days ago and Skyler told her you would do it but you were out.  Not sure why she didn't do it.  It's frustrating since I prepped her chart for like the millionth time since she has missed multiple appts.  I'm not sure why Skyler didn't  just reschedule her.     Dr YEAGER

## 2022-01-05 NOTE — TELEPHONE ENCOUNTER
Skyler was unable to r/s the appt b/c patient has Medicaid and you need special access to schedule these patients. I sw her and r/s this appt

## 2022-01-18 NOTE — PROGRESS NOTES
[facility] Note    Chief Complaint      Chief Complaint   Patient presents with    Follow-up       History of Present Illness      Linn Price is a 61 y.o.  WF with HTN, DM - II, JANAE on CPAP, Chronic Constipation, GERD, Colon polyps,Pulm nodule, and Nicotine Dependence in Remission presents to fu well visit and chronic issues. Last virtual visit - 5/6/21    Pulm nodules - CT Chest -11/29/21 - Stable 7 mm right middle lobe pulmonary nodule. Normal heart size.  Trace pericardial fluid which is not a concern and nothing she needs to do anything about. Return to yearly screening low-dose lung cancer screening CT. Will repeat in Dec 2022    Thyroid nodule - Thyroid U/S - 5/25/21- small (9mm) nodule that does not require further intervention of follow up.     COPD - Pt with prev PFT's at  - + Obstruction but refused bronchodilator. +SOB at times.      Nicotine Dependence -in remission - Pt quit 1/12/20.  Congratulated on cessation.      HTN - She saw Cards at .  Recent CT showed - A small pericardial effusion is present.  Started amlodipine 2.5 mg/d which she stopped because she thought it was worsening her Skin condition. Stopping it did not change the skin issues.  She is fu by digital BP monitoring program. She is undergoing carnes with Cards.  Cont to monitor. Will add telmisartan. Pt fu A/I carnes for recent food allergies with lip swelling first.      Left renal cyst - 1.8 cm simple parapelvic cyst. Lower pole Left kidney.     DM - II - Repeat Ha1c.  Cont Metformin  mg QHS.  Glc runs - 125. You had no evidence of microalbuminuria.   Ha1c is controlled at 6.0 in July.     JANAE on CPAP - She uses CPAP it nightly x 6-7 hrs. Rec low carb diet and exercise for wt loss. Doing well.      Chronic constipation - Doing well at present.  Rec adeq hydration.  Eating a yogurt daily has helped.      Colon polyps - Fu by Dr. Swain. Due. She is planning to sched. When insurance changes back in a couple  mos.     GERD - Rec reflux prec. Can take Pepcid prn-rarely needs it.      Vit D def - 30 up from 14 - Pt completed Ergocalciferol once weekly. Pt currently on Vit D3 4000 u/d.       Dishydrotic Eczema- Dx via Bx. Fu by Derm - Dr. Downey/Jose Daniel. Doing better.  She is on light therapy.  She can walk and use shoes now.  She is also on topicals.      Chronic Otitis Externa - Fu by ENT, Dr. David. Resolved. Doing well.      Obesity - BMI - 33.57 - Down 10 lb since jan. Rec low carb diet and exercising.         HLD - Pt has not yet started her Lipitor.   Atherosclerosis including coronary arteries.  I had rec she start her Lipitor, Aspirin 81mg. She did not start statin. She is undergoing carnes with Cards.  Her cholesterol was not quite controlled  Her goal LDL is < 70. Cont to monitor.      Lab review:   5/22/20 - WBC - 7.6;  H/H - 12.1/36.3; Plt - 338;  BUN/Cr - 12/0.7; LFt's - wnl;   TG 80; Ha1c - 6.9; TSH - 1.52;  Vit D -14      HCM - Flu - none - refuses;  Tdap -10/28/15;  PCV 13 - none - declines;  PVX 23 - none - declines;  Shingrix - none - declines;  COVID -19 vaccine #1 - declines; MGM - 12/1/20 - neg - will order;  DEXA - 7/23/21 - wnl;  PAP -7/6/21 - neg; Hep C Screen - 8/26/14 - neg;  HIV Screen - none - defers; C-scope - 6/11/18 - repeat 3 yrs - due;  Prev PCP - me at [facility] and then Dr. Armenta;  OB/GYN - Dr. Catalan;  GI - Dr. Swain; A/I - Dr. Cordova;  ENT- Dr. David; Derm - Dr. Downey; Ophtho - Dr. Shin; Rheum - Dr. Campoverde; Cards - ? At [facility].      Patient Care Team:  Erika Mcclure MD as PCP - General (Internal Medicine)  Brent Wilson MD as Obstetrician (Obstetrics)  Santana Swain MD as Consulting Physician (Gastroenterology)  Daniel Bynum MD as Consulting Physician (General Surgery)  Eugene Fontana Jr., MD as Consulting Physician (Otolaryngology)  Chong Tang MD as Consulting Physician (Ophthalmology)  Melissa Austin MA as Care Coordinator  Julianne Narvaez as Digital  Medicine Health   Charlotte Hoyt PharmD as Hypertension Digital Medicine Clinician  Erika Mcclure MD as Hypertension Digital Medicine Responsible Provider (Internal Medicine)  Charlotte Hoyt PharmD as Diabetes Digital Medicine Clinician  Erika Mcclure MD as Diabetes Digital Medicine Responsible Provider (Internal Medicine)  Medicaid (Non-[facility]) as Hypertension Digital Medicine Contract  Medicaid (Non-[facility]) as Diabetes Digital Medicine Contract     Radiology Reports           Exam Date Time Procedure Performing Provider Status   8/12/21 10:36 AM NM Riley Spect Rest/Exercise w/ ECG PereaSusan yeh Cira H;  Auth (Verified)   Notes:    (NM Riley Spect Rest/Exercise w/ ECG) Reason For Exam: Ischemic Heart Disease, unspecified    REPORT  = = = = = = = = = = = = = = = = = = = = = = = = = = = = = = = = = = = = = = = =  Patient Number: [id number] --- Patient Name: JENNIFER NGO ( )  Ordering Physician: Mauro EDWARD Wilber  = = = = = = = = = = = = = = = = = = = = = = = = = = = = = = = = = = = = = = = =    HISTORY: Ischemic Heart Disease, unspecified  DIAG: L40.9 - Psoriasis, unspecified  Reason for Visit: Essential (primary) hypertension     MYOCARDIAL SPECT REST/EXERCISE    COMPARISON:Chest CT 1/25/2018 (lung screening).    PROCEDURE: Following intravenous administration of 7.0 millicuries of Technetium 99-m Myoview, gated cardiac tomographic images were acquired. The patient was stressed utilizing a Sinan protocol, and exercised for 7 minutes and 38 seconds. Exercise was stopped due to fatigue and shortness of breath. Patient achieved 90% % of maximum predicted heart rate and 6.6 METs. At maximal stress patient received an intravenous dose of 31.3 millicuries of Technetium 99-m Myoview, and gated cardiac tomographic images were acquired. The images are reformatted in short axis, horizontal and vertical long axis views. Prone and supine stress images were obtained.    FINDINGS: Calculated left ventricular  ejection fraction is 80%% at rest and 72% during stress. No focal reversible defects are identified to suggest stress induced ischemia. Left ventricular heart chamber size appears unremarkable. Cinematic evaluation of the heart demonstrates adequate contractility and thickening about all the walls of the heart.     IMPRESSION:    No reversible perfusion defects. No abnormal wall motion.    Final     Finalized: Luis Bobo MD 08/12/2021 12:36          Health Maintenance:    There is no immunization history on file for this patient.   Health Maintenance   Topic Date Due    Low Dose Statin  Never done    Foot Exam  09/10/2015    Mammogram  12/01/2021    Hemoglobin A1c  01/12/2022    Eye Exam  05/20/2022    Lipid Panel  07/12/2022    LDCT Lung Screen  11/29/2022    DEXA SCAN  07/23/2023    TETANUS VACCINE  10/28/2025    Pap Smear with HPV Cotest  07/06/2026    Hepatitis C Screening  Completed        Past Medical History:  Past Medical History:   Diagnosis Date    Benign hypertension 8/19/2014    Broken fingers     Broken toe     Colon polyps 8/19/2014    Diabetes mellitus     Elevated fasting glucose 8/19/2014    Family history of bladder cancer     Family history of heart disease 8/19/2014    History of broken nose     Menopause 2009    JANAE on CPAP 11/15/2007    Per PSG: Rx'd CPAP, pressure 13, using small Comfort Select mask or interface of patient's choice, chin strap, and heated humidifier      Pulmonary nodule 12/1/2020    Pyloric stenosis     as a child    Reflux esophagitis 8/19/2014    Per EGD 8/9/2006      Tobacco abuse     Ulcer     at 14 y.o.       Past Surgical History:   has a past surgical history that includes Gastric restriction surgery; Hardy tooth extraction; Upper gastrointestinal endoscopy (2006); Colonoscopy (2018); and Tonsillectomy.    Family History:  family history includes Bladder Cancer in her maternal uncle; COPD in her sister; Dementia in her mother; Diabetes  in her father and sister; Hypertension in her brother, mother, and sister; Kidney disease in her mother; Macular degeneration in her mother; Prostate cancer in her father; Psoriasis in her mother; Stroke (age of onset: 60) in her mother; Stroke (age of onset: 75) in her father.     Social History:  Social History     Tobacco Use    Smoking status: Former Smoker     Packs/day: 1.50     Years: 40.00     Pack years: 60.00     Types: Cigarettes     Start date: 3/17/1973     Quit date: 2020     Years since quittin.0    Smokeless tobacco: Never Used   Substance Use Topics    Alcohol use: Yes     Comment: social - rare    Drug use: Never       Review of Systems   Constitutional: Negative for chills, diaphoresis and fever.   HENT: Negative for nasal congestion and sore throat.    Eyes: Negative for visual disturbance.   Respiratory: Positive for shortness of breath. Negative for cough and wheezing.         On occasion - if bends over   Cardiovascular: Negative for chest pain and palpitations.   Gastrointestinal: Negative for abdominal pain, blood in stool, constipation, diarrhea, nausea and vomiting.   Genitourinary: Negative for dysuria and frequency.   Musculoskeletal: Positive for neck pain. Negative for arthralgias and myalgias.   Neurological: Negative for dizziness and headaches.   Psychiatric/Behavioral: Positive for dysphoric mood. The patient is not nervous/anxious.         With sister being ill.   was caring for her after a recent liver transplant s/p COVID.  She received a Liver from a Hep C pt and pt concerned and wants to have Hep C testing. She does not feel she needs further intervention        Medications:    Current Outpatient Medications:     ascorbic acid, vitamin C, (VITAMIN C) 100 MG tablet, Take 100 mg by mouth once daily., Disp: , Rfl:     blood sugar diagnostic Strp, Accu-Chek Rashida Plus test strips, Disp: , Rfl:     blood sugar diagnostic Strp, To check BG once daily, to use with  insurance preferred meter, Disp: 100 strip, Rfl: 3    blood-glucose meter kit, To check BG once daily, to use with insurance preferred meter, Disp: 1 each, Rfl: 0    calcipotriene (DOVONOX) 0.005 % cream, Apply topically 2 (two) times daily. To affected areas on hands and feet., Disp: 120 g, Rfl: 3    clobetasol 0.05% (TEMOVATE) 0.05 % Oint, AAA on hands and feet BID  x 1-2 wks then prn flares only, Disp: 60 g, Rfl: 3    ELIDEL 1 % cream, Apply topically., Disp: , Rfl:     halobetasol (ULTRAVATE) 0.05 % ointment, Apply topically 2 (two) times daily. To affected areas on hands and feet, Disp: 100 g, Rfl: 3    lancets (ONETOUCH DELICA PLUS LANCET) 30 gauge Misc, TO CHECK BLOOD GLUCOSE ONCE DAILY, Disp: 100 each, Rfl: 1    metFORMIN (GLUCOPHAGE-XR) 500 MG ER 24hr tablet, Take 1 tablet (500 mg total) by mouth once daily., Disp: 90 tablet, Rfl: 0    multivitamin capsule, Take 1 capsule by mouth once daily., Disp: , Rfl:     ONETOUCH DELICA PLUS LANCET 30 gauge Misc, TO CHECK BLOOD GLUCOSE ONCE DAILY, Disp: , Rfl:     ONETOUCH ULTRA2 METER Misc, USE TO CHECK BLOOD GLUCOSE ONCE DAILY, Disp: , Rfl:     prednisoLONE acetate (PRED FORTE) 1 % DrpS, , Disp: , Rfl:     sulfacetamide sodium 10% (BLEPH-10) 10 % ophthalmic solution, INSTILL 10 GTS AU TID FOR 7 DAYS, Disp: , Rfl: 5    triamcinolone acetonide 0.1% (KENALOG) 0.1 % ointment, APPLY TO THE AFFECTED AREA ON HANDS AND FEET TWICE DAILY FOR 14 DAYS, Disp: , Rfl:     vitamin D (VITAMIN D3) 1000 units Tab, Take 1,000 Units by mouth once daily., Disp: , Rfl:     zinc gluconate 50 mg tablet, Take 50 mg by mouth once daily., Disp: , Rfl:     acetic acid-hydrocortisone (VOSOL-HC) otic solution, INT 5 GTS INTO AU 3 XD FOR 10 DAYS, Disp: , Rfl: 3    aspirin 81 MG Chew, Take 1 tablet (81 mg total) by mouth once daily., Disp: , Rfl: 0    doxycycline (MONODOX) 100 MG capsule, Take 100 mg by mouth 2 (two) times daily., Disp: , Rfl:     EPINEPHrine (EPIPEN) 0.3 mg/0.3  "mL AtIn, Inject 0.3 mLs (0.3 mg total) into the muscle once. for 1 dose, Disp: 2 each, Rfl: 0    telmisartan (MICARDIS) 20 MG Tab, Take 1 tablet (20 mg total) by mouth once daily., Disp: 90 tablet, Rfl: 0     Allergies:  Review of patient's allergies indicates:   Allergen Reactions    Asparagus Anxiety, Dermatitis, Hives, Itching, Rash and Swelling    Codeine Other (See Comments)     psychosis       Physical Exam      Vital Signs  Temp: 98.1 °F (36.7 °C)  Temp src: Oral  Pulse: 70  SpO2: 99 %  BP: (!) 150/80  Pain Score: 0-No pain  Height and Weight  Height: 5' 5" (165.1 cm)  Weight: 91.5 kg (201 lb 11.5 oz)  BSA (Calculated - sq m): 2.05 sq meters  BMI (Calculated): 33.6  Weight in (lb) to have BMI = 25: 149.9             Physical Exam  Vitals reviewed.   Constitutional:       General: She is not in acute distress.     Appearance: Normal appearance. She is not ill-appearing, toxic-appearing or diaphoretic.   HENT:      Head: Normocephalic and atraumatic.      Right Ear: Tympanic membrane normal.      Left Ear: Tympanic membrane normal.   Eyes:      Extraocular Movements: Extraocular movements intact.      Conjunctiva/sclera: Conjunctivae normal.      Pupils: Pupils are equal, round, and reactive to light.   Cardiovascular:      Rate and Rhythm: Normal rate and regular rhythm.      Pulses: Normal pulses.      Heart sounds: Normal heart sounds. No murmur heard.      Pulmonary:      Effort: No respiratory distress.      Breath sounds: Normal breath sounds. No wheezing.   Abdominal:      General: Bowel sounds are normal. There is no distension.      Palpations: Abdomen is soft.      Tenderness: There is no abdominal tenderness. There is no guarding or rebound.   Musculoskeletal:         General: Normal range of motion.   Skin:     General: Skin is warm and dry.   Neurological:      General: No focal deficit present.      Mental Status: She is alert and oriented to person, place, and time.   Psychiatric:         Mood " and Affect: Mood normal.         Behavior: Behavior normal.          Laboratory:    CMP:  Recent Labs   Lab 03/12/21  1113 03/12/21  1113 05/01/21  0828   Glucose 114 H   < > 123 H   Calcium 10.2   < > 9.3   ALBUMIN 4.8  --   --    Total Protein 8.1  --   --    Sodium 144   < > 139   Potassium 4.9   < > 4.7   CO2 28   < > 27   Chloride 102   < > 104   BUN 14  --  19   Creatinine 0.7   < > 0.73   Alkaline Phosphatase 106  --   --    ALT 19  --   --    AST 19  --   --    Total Bilirubin 0.4  --   --     < > = values in this interval not displayed.       LIPIDS:  Recent Labs   Lab 03/12/21  1113 07/12/21  0000   HDL 57 62   Cholesterol 202 H 175   Triglycerides 98 82   LDL Calculated 129 H 100   Non-HDL Cholesterol 145 H 113       A1C:  Recent Labs   Lab 03/12/21  1113 07/12/21  0000   Hemoglobin A1C 8.0 H 6.0 H       Other:   Recent Labs   Lab 07/06/21  1553   Vit D, 25-Hydroxy 30   Ferritin 121   Iron 38   Transferrin 280   TIBC 414   Saturated Iron 9 L           Assessment/Plan     Linn Price is a 61 y.o.female with:    Normal physical exam, routine  -     CBC Auto Differential; Future; Expected date: 01/21/2022  -     T4, Free; Future; Expected date: 01/21/2022  -     TSH; Future; Expected date: 01/21/2022  -     Comprehensive Metabolic Panel; Future; Expected date: 01/21/2022  - Performed today.  Will check Basic labs.  RTC in 1 yr for fu or sooner if needed    Type 2 diabetes mellitus without complication, without long-term current use of insulin  -     Hemoglobin A1C; Future; Expected date: 01/21/2022  -     Microalbumin/Creatinine Ratio, Urine; Future; Expected date: 01/21/2022  - Controlled.  Cont current.     Hyperlipidemia, unspecified hyperlipidemia type  -     Lipid Panel; Future; Expected date: 01/21/2022  - Stable.  Cont current regimen.    JANAE on CPAP  - Stable.  Cont current regimen.    Hypertension associated with diabetes  -     telmisartan (MICARDIS) 20 MG Tab; Take 1 tablet (20 mg total) by  mouth once daily.  Dispense: 90 tablet; Refill: 0  -Uncontrolled.  Will start telmisartan.     Pulmonary nodule  - Stable.  Cont current regimen.    Polyp of colon, unspecified part of colon, unspecified type  - Planning for repeat C-scope.     Obesity, unspecified classification, unspecified obesity type, unspecified whether serious comorbidity present  - Rec diet and exercise as discussed for wt loss.      Need for hepatitis C screening test  -     Hepatitis C Antibody; Future; Expected date: 01/21/2022    Screening mammogram, encounter for  -     Mammo Digital Screening Bilat w/ Pranay; Future; Expected date: 01/21/2022    Other orders  -     aspirin 81 MG Chew; Take 1 tablet (81 mg total) by mouth once daily.; Refill: 0         Chronic conditions status updated as per HPI.  Other than changes above, cont current medications and maintain follow up with specialists.Follow up in about 3 months (around 4/21/2022).      Erika Mcclure MD  [facility]

## 2022-01-21 ENCOUNTER — OFFICE VISIT (OUTPATIENT)
Dept: PRIMARY CARE CLINIC | Facility: CLINIC | Age: 62
End: 2022-01-21
Payer: MEDICAID

## 2022-01-21 VITALS
HEIGHT: 65 IN | WEIGHT: 201.75 LBS | OXYGEN SATURATION: 99 % | SYSTOLIC BLOOD PRESSURE: 150 MMHG | HEART RATE: 70 BPM | BODY MASS INDEX: 33.61 KG/M2 | DIASTOLIC BLOOD PRESSURE: 80 MMHG | TEMPERATURE: 98 F

## 2022-01-21 DIAGNOSIS — Z11.59 NEED FOR HEPATITIS C SCREENING TEST: ICD-10-CM

## 2022-01-21 DIAGNOSIS — Z00.00 NORMAL PHYSICAL EXAM, ROUTINE: Primary | ICD-10-CM

## 2022-01-21 DIAGNOSIS — E11.9 TYPE 2 DIABETES MELLITUS WITHOUT COMPLICATION, WITHOUT LONG-TERM CURRENT USE OF INSULIN: ICD-10-CM

## 2022-01-21 DIAGNOSIS — I15.2 HYPERTENSION ASSOCIATED WITH DIABETES: ICD-10-CM

## 2022-01-21 DIAGNOSIS — G47.33 OSA ON CPAP: ICD-10-CM

## 2022-01-21 DIAGNOSIS — E78.5 HYPERLIPIDEMIA, UNSPECIFIED HYPERLIPIDEMIA TYPE: ICD-10-CM

## 2022-01-21 DIAGNOSIS — E66.9 OBESITY, UNSPECIFIED CLASSIFICATION, UNSPECIFIED OBESITY TYPE, UNSPECIFIED WHETHER SERIOUS COMORBIDITY PRESENT: ICD-10-CM

## 2022-01-21 DIAGNOSIS — R91.1 PULMONARY NODULE: ICD-10-CM

## 2022-01-21 DIAGNOSIS — K63.5 POLYP OF COLON, UNSPECIFIED PART OF COLON, UNSPECIFIED TYPE: ICD-10-CM

## 2022-01-21 DIAGNOSIS — Z12.31 SCREENING MAMMOGRAM, ENCOUNTER FOR: ICD-10-CM

## 2022-01-21 DIAGNOSIS — E11.59 HYPERTENSION ASSOCIATED WITH DIABETES: ICD-10-CM

## 2022-01-21 PROCEDURE — 1159F PR MEDICATION LIST DOCUMENTED IN MEDICAL RECORD: ICD-10-PCS | Mod: CPTII,,, | Performed by: INTERNAL MEDICINE

## 2022-01-21 PROCEDURE — 1159F MED LIST DOCD IN RCRD: CPT | Mod: CPTII,,, | Performed by: INTERNAL MEDICINE

## 2022-01-21 PROCEDURE — 99215 OFFICE O/P EST HI 40 MIN: CPT | Mod: PBBFAC,PN | Performed by: INTERNAL MEDICINE

## 2022-01-21 PROCEDURE — 3079F DIAST BP 80-89 MM HG: CPT | Mod: CPTII,,, | Performed by: INTERNAL MEDICINE

## 2022-01-21 PROCEDURE — 99999 PR PBB SHADOW E&M-EST. PATIENT-LVL V: CPT | Mod: PBBFAC,,, | Performed by: INTERNAL MEDICINE

## 2022-01-21 PROCEDURE — 3079F PR MOST RECENT DIASTOLIC BLOOD PRESSURE 80-89 MM HG: ICD-10-PCS | Mod: CPTII,,, | Performed by: INTERNAL MEDICINE

## 2022-01-21 PROCEDURE — 99396 PR PREVENTIVE VISIT,EST,40-64: ICD-10-PCS | Mod: S$PBB,,, | Performed by: INTERNAL MEDICINE

## 2022-01-21 PROCEDURE — 3008F PR BODY MASS INDEX (BMI) DOCUMENTED: ICD-10-PCS | Mod: CPTII,,, | Performed by: INTERNAL MEDICINE

## 2022-01-21 PROCEDURE — 1160F PR REVIEW ALL MEDS BY PRESCRIBER/CLIN PHARMACIST DOCUMENTED: ICD-10-PCS | Mod: CPTII,,, | Performed by: INTERNAL MEDICINE

## 2022-01-21 PROCEDURE — 99999 PR PBB SHADOW E&M-EST. PATIENT-LVL V: ICD-10-PCS | Mod: PBBFAC,,, | Performed by: INTERNAL MEDICINE

## 2022-01-21 PROCEDURE — 4010F ACE/ARB THERAPY RXD/TAKEN: CPT | Mod: CPTII,,, | Performed by: INTERNAL MEDICINE

## 2022-01-21 PROCEDURE — 1160F RVW MEDS BY RX/DR IN RCRD: CPT | Mod: CPTII,,, | Performed by: INTERNAL MEDICINE

## 2022-01-21 PROCEDURE — 4010F PR ACE/ARB THEARPY RXD/TAKEN: ICD-10-PCS | Mod: CPTII,,, | Performed by: INTERNAL MEDICINE

## 2022-01-21 PROCEDURE — 3077F SYST BP >= 140 MM HG: CPT | Mod: CPTII,,, | Performed by: INTERNAL MEDICINE

## 2022-01-21 PROCEDURE — 99396 PREV VISIT EST AGE 40-64: CPT | Mod: S$PBB,,, | Performed by: INTERNAL MEDICINE

## 2022-01-21 PROCEDURE — 3077F PR MOST RECENT SYSTOLIC BLOOD PRESSURE >= 140 MM HG: ICD-10-PCS | Mod: CPTII,,, | Performed by: INTERNAL MEDICINE

## 2022-01-21 PROCEDURE — 3008F BODY MASS INDEX DOCD: CPT | Mod: CPTII,,, | Performed by: INTERNAL MEDICINE

## 2022-01-21 RX ORDER — CHOLECALCIFEROL (VITAMIN D3) 25 MCG
1000 TABLET ORAL DAILY
COMMUNITY

## 2022-01-21 RX ORDER — TELMISARTAN 20 MG/1
20 TABLET ORAL DAILY
Qty: 90 TABLET | Refills: 0 | Status: SHIPPED | OUTPATIENT
Start: 2022-01-21 | End: 2022-02-01

## 2022-01-21 RX ORDER — NAPROXEN SODIUM 220 MG/1
81 TABLET, FILM COATED ORAL DAILY
Refills: 0
Start: 2022-01-21 | End: 2023-09-29

## 2022-01-21 RX ORDER — ZINC GLUCONATE 50 MG
50 TABLET ORAL DAILY
COMMUNITY
End: 2023-06-22

## 2022-01-21 NOTE — PATIENT INSTRUCTIONS
Patient Education       Telmisartan (tel mi CATHIE tan)   Brand Names: US Micardis   Brand Names: Damion ACH-Telmisartan; ACT Telmisartan [DSC]; APO-Telmisartan; Auro-Telmisartan; JAMP-Telmisartan; Micardis; MINT-Telmisartan; MYLAN-Telmisartan [DSC]; PMS-Telmisartan; RAN-Telmisartan; SANDOZ Telmisartan; TEVA-Telmisartan; VAN-Telmisartan [DSC]   Warning   · Do not take if you are pregnant. Use during pregnancy may cause birth defects or loss of the unborn baby. If you get pregnant or plan on getting pregnant while taking this drug, call your doctor right away.    What is this drug used for?   · It is used to treat high blood pressure.  · It is used to lower the chance of heart attack, stroke, and death in some people.  · It may be given to you for other reasons. Talk with the doctor.    What do I need to tell my doctor BEFORE I take this drug?   · If you have an allergy to telmisartan or any other part of this drug.  · If you are allergic to this drug; any part of this drug; or any other drugs, foods, or substances. Tell your doctor about the allergy and what signs you had.  · If you are dehydrated or have electrolyte problems.  · If you are taking or have recently taken any of these drugs: Benazepril, captopril, enalapril, fosinopril, lisinopril, moexipril, perindopril, quinapril, ramipril, or trandolapril.  · If you are taking a drug that has aliskiren in it and you also have diabetes or kidney problems.  · If you are breast-feeding. Do not breast-feed while you take this drug.  This is not a list of all drugs or health problems that interact with this drug.  Tell your doctor and pharmacist about all of your drugs (prescription or OTC, natural products, vitamins) and health problems. You must check to make sure that it is safe for you to take this drug with all of your drugs and health problems. Do not start, stop, or change the dose of any drug without checking with your doctor.  What are some things I need to know  or do while I take this drug?   · Tell all of your health care providers that you take this drug. This includes your doctors, nurses, pharmacists, and dentists.  · Avoid driving and doing other tasks or actions that call for you to be alert until you see how this drug affects you.  · To lower the chance of feeling dizzy or passing out, rise slowly if you have been sitting or lying down. Be careful going up and down stairs.  · Check your blood pressure as you have been told.  · Have blood work checked as you have been told by the doctor. Talk with the doctor.  · If you are on a low-salt or salt-free diet, talk with your doctor.  · If you are taking a salt substitute that has potassium in it, a potassium-sparing diuretic, or a potassium product, talk with your doctor.  · If you are taking this drug and have high blood pressure, talk with your doctor before using OTC products that may raise blood pressure. These include cough or cold drugs, diet pills, stimulants, non-steroidal anti-inflammatory drugs (NSAIDs) like ibuprofen or naproxen, and some natural products or aids.  · Be careful in hot weather or while being active. Drink lots of fluids to stop fluid loss.  · Tell your doctor if you have too much sweat, fluid loss, throwing up, or loose stools. This may lead to low blood pressure.  · This drug may not work as well to lower blood pressure in Black patients. Sometimes another drug may need to be given with this drug. If you have any questions, talk with the doctor.    What are some side effects that I need to call my doctor about right away?   WARNING/CAUTION: Even though it may be rare, some people may have very bad and sometimes deadly side effects when taking a drug. Tell your doctor or get medical help right away if you have any of the following signs or symptoms that may be related to a very bad side effect:  · Signs of an allergic reaction, like rash; hives; itching; red, swollen, blistered, or peeling skin  with or without fever; wheezing; tightness in the chest or throat; trouble breathing, swallowing, or talking; unusual hoarseness; or swelling of the mouth, face, lips, tongue, or throat.  · Signs of high potassium levels like a heartbeat that does not feel normal; feeling confused; feeling weak, lightheaded, or dizzy; feeling like passing out; numbness or tingling; or shortness of breath.  · Signs of kidney problems like unable to pass urine, change in how much urine is passed, blood in the urine, or a big weight gain.  · Dizziness or passing out.  · Swelling in the arms or legs.  · Rarely, a very bad reaction called angioedema has happened with this drug. Sometimes, this has been deadly. Get medical help right away if you have signs like swelling of the hands, face, lips, eyes, tongue, or throat; trouble breathing or swallowing; or unusual hoarseness.  What are some other side effects of this drug?   All drugs may cause side effects. However, many people have no side effects or only have minor side effects. Call your doctor or get medical help if any of these side effects or any other side effects bother you or do not go away:  · Stuffy nose.  · Sinus irritation.  · Back pain.  · Diarrhea.  These are not all of the side effects that may occur. If you have questions about side effects, call your doctor. Call your doctor for medical advice about side effects.  You may report side effects to your national health agency.  You may report side effects to the FDA at 1-114.479.4716. You may also report side effects at https://www.fda.gov/medwatch.  How is this drug best taken?   Use this drug as ordered by your doctor. Read all information given to you. Follow all instructions closely.  · Take with or without food.  · Keep taking this drug as you have been told by your doctor or other health care provider, even if you feel well.  · Take this drug at the same time of day.  · Some brands of this drug come in a blister pack.  If this drug comes in a blister pack, take out the tablet right before use. Peel back the foil on the blister. Do not push the tablet through the foil. Doing so may cause the tablet to break.  · Drink lots of noncaffeine liquids unless told to drink less liquid by your doctor.  What do I do if I miss a dose?   · Take a missed dose as soon as you think about it.  · If it is close to the time for your next dose, skip the missed dose and go back to your normal time.  · Do not take 2 doses at the same time or extra doses.    How do I store and/or throw out this drug?   · Store in the original container at room temperature.  · Store in a dry place. Do not store in a bathroom.  · Keep all drugs in a safe place. Keep all drugs out of the reach of children and pets.  · Throw away unused or  drugs. Do not flush down a toilet or pour down a drain unless you are told to do so. Check with your pharmacist if you have questions about the best way to throw out drugs. There may be drug take-back programs in your area.    General drug facts   · If your symptoms or health problems do not get better or if they become worse, call your doctor.  · Do not share your drugs with others and do not take anyone else's drugs.  · Some drugs may have another patient information leaflet. If you have any questions about this drug, please talk with your doctor, nurse, pharmacist, or other health care provider.  · Some drugs may have another patient information leaflet. Check with your pharmacist. If you have any questions about this drug, please talk with your doctor, nurse, pharmacist, or other health care provider.  · If you think there has been an overdose, call your poison control center or get medical care right away. Be ready to tell or show what was taken, how much, and when it happened.    Consumer Information Use and Disclaimer   This generalized information is a limited summary of diagnosis, treatment, and/or medication information.  It is not meant to be comprehensive and should be used as a tool to help the user understand and/or assess potential diagnostic and treatment options. It does NOT include all information about conditions, treatments, medications, side effects, or risks that may apply to a specific patient. It is not intended to be medical advice or a substitute for the medical advice, diagnosis, or treatment of a health care provider based on the health care provider's examination and assessment of a patient's specific and unique circumstances. Patients must speak with a health care provider for complete information about their health, medical questions, and treatment options, including any risks or benefits regarding use of medications. This information does not endorse any treatments or medications as safe, effective, or approved for treating a specific patient. UpToDate, Inc. and its affiliates disclaim any warranty or liability relating to this information or the use thereof. The use of this information is governed by the Terms of Use, available at https://www.L[employer]/en/solutions/lexicomp/about/gideon.  Last Reviewed Date   2019-06-06  Copyright   © 2021 UpToDate, Inc. and its affiliates and/or licensors. All rights reserved.  Patient Education       Telmisartan (tel mi CATHIE tan)   Brand Names: US Micardis   Brand Names: Damion ACH-Telmisartan; ACT Telmisartan [DSC]; APO-Telmisartan; Auro-Telmisartan; JAMP-Telmisartan; Micardis; MINT-Telmisartan; MYLAN-Telmisartan [DSC]; PMS-Telmisartan; RAN-Telmisartan; SANDOZ Telmisartan; TEVA-Telmisartan; VAN-Telmisartan [DSC]   Warning   · Do not take if you are pregnant. Use during pregnancy may cause birth defects or loss of the unborn baby. If you get pregnant or plan on getting pregnant while taking this drug, call your doctor right away.    What is this drug used for?   · It is used to treat high blood pressure.  · It is used to lower the chance of heart attack, stroke, and  death in some people.  · It may be given to you for other reasons. Talk with the doctor.    What do I need to tell my doctor BEFORE I take this drug?   · If you have an allergy to telmisartan or any other part of this drug.  · If you are allergic to this drug; any part of this drug; or any other drugs, foods, or substances. Tell your doctor about the allergy and what signs you had.  · If you are dehydrated or have electrolyte problems.  · If you are taking or have recently taken any of these drugs: Benazepril, captopril, enalapril, fosinopril, lisinopril, moexipril, perindopril, quinapril, ramipril, or trandolapril.  · If you are taking a drug that has aliskiren in it and you also have diabetes or kidney problems.  · If you are breast-feeding. Do not breast-feed while you take this drug.  This is not a list of all drugs or health problems that interact with this drug.  Tell your doctor and pharmacist about all of your drugs (prescription or OTC, natural products, vitamins) and health problems. You must check to make sure that it is safe for you to take this drug with all of your drugs and health problems. Do not start, stop, or change the dose of any drug without checking with your doctor.  What are some things I need to know or do while I take this drug?   · Tell all of your health care providers that you take this drug. This includes your doctors, nurses, pharmacists, and dentists.  · Avoid driving and doing other tasks or actions that call for you to be alert until you see how this drug affects you.  · To lower the chance of feeling dizzy or passing out, rise slowly if you have been sitting or lying down. Be careful going up and down stairs.  · Check your blood pressure as you have been told.  · Have blood work checked as you have been told by the doctor. Talk with the doctor.  · If you are on a low-salt or salt-free diet, talk with your doctor.  · If you are taking a salt substitute that has potassium in it, a  potassium-sparing diuretic, or a potassium product, talk with your doctor.  · If you are taking this drug and have high blood pressure, talk with your doctor before using OTC products that may raise blood pressure. These include cough or cold drugs, diet pills, stimulants, non-steroidal anti-inflammatory drugs (NSAIDs) like ibuprofen or naproxen, and some natural products or aids.  · Be careful in hot weather or while being active. Drink lots of fluids to stop fluid loss.  · Tell your doctor if you have too much sweat, fluid loss, throwing up, or loose stools. This may lead to low blood pressure.  · This drug may not work as well to lower blood pressure in Black patients. Sometimes another drug may need to be given with this drug. If you have any questions, talk with the doctor.    What are some side effects that I need to call my doctor about right away?   WARNING/CAUTION: Even though it may be rare, some people may have very bad and sometimes deadly side effects when taking a drug. Tell your doctor or get medical help right away if you have any of the following signs or symptoms that may be related to a very bad side effect:  · Signs of an allergic reaction, like rash; hives; itching; red, swollen, blistered, or peeling skin with or without fever; wheezing; tightness in the chest or throat; trouble breathing, swallowing, or talking; unusual hoarseness; or swelling of the mouth, face, lips, tongue, or throat.  · Signs of high potassium levels like a heartbeat that does not feel normal; feeling confused; feeling weak, lightheaded, or dizzy; feeling like passing out; numbness or tingling; or shortness of breath.  · Signs of kidney problems like unable to pass urine, change in how much urine is passed, blood in the urine, or a big weight gain.  · Dizziness or passing out.  · Swelling in the arms or legs.  · Rarely, a very bad reaction called angioedema has happened with this drug. Sometimes, this has been deadly. Get  medical help right away if you have signs like swelling of the hands, face, lips, eyes, tongue, or throat; trouble breathing or swallowing; or unusual hoarseness.  What are some other side effects of this drug?   All drugs may cause side effects. However, many people have no side effects or only have minor side effects. Call your doctor or get medical help if any of these side effects or any other side effects bother you or do not go away:  · Stuffy nose.  · Sinus irritation.  · Back pain.  · Diarrhea.  These are not all of the side effects that may occur. If you have questions about side effects, call your doctor. Call your doctor for medical advice about side effects.  You may report side effects to your national health agency.  You may report side effects to the FDA at 1-257.722.5664. You may also report side effects at https://www.fda.gov/medwatch.  How is this drug best taken?   Use this drug as ordered by your doctor. Read all information given to you. Follow all instructions closely.  · Take with or without food.  · Keep taking this drug as you have been told by your doctor or other health care provider, even if you feel well.  · Take this drug at the same time of day.  · Some brands of this drug come in a blister pack. If this drug comes in a blister pack, take out the tablet right before use. Peel back the foil on the blister. Do not push the tablet through the foil. Doing so may cause the tablet to break.  · Drink lots of noncaffeine liquids unless told to drink less liquid by your doctor.  What do I do if I miss a dose?   · Take a missed dose as soon as you think about it.  · If it is close to the time for your next dose, skip the missed dose and go back to your normal time.  · Do not take 2 doses at the same time or extra doses.    How do I store and/or throw out this drug?   · Store in the original container at room temperature.  · Store in a dry place. Do not store in a bathroom.  · Keep all drugs in a  safe place. Keep all drugs out of the reach of children and pets.  · Throw away unused or  drugs. Do not flush down a toilet or pour down a drain unless you are told to do so. Check with your pharmacist if you have questions about the best way to throw out drugs. There may be drug take-back programs in your area.    General drug facts   · If your symptoms or health problems do not get better or if they become worse, call your doctor.  · Do not share your drugs with others and do not take anyone else's drugs.  · Some drugs may have another patient information leaflet. If you have any questions about this drug, please talk with your doctor, nurse, pharmacist, or other health care provider.  · Some drugs may have another patient information leaflet. Check with your pharmacist. If you have any questions about this drug, please talk with your doctor, nurse, pharmacist, or other health care provider.  · If you think there has been an overdose, call your poison control center or get medical care right away. Be ready to tell or show what was taken, how much, and when it happened.    Consumer Information Use and Disclaimer   This generalized information is a limited summary of diagnosis, treatment, and/or medication information. It is not meant to be comprehensive and should be used as a tool to help the user understand and/or assess potential diagnostic and treatment options. It does NOT include all information about conditions, treatments, medications, side effects, or risks that may apply to a specific patient. It is not intended to be medical advice or a substitute for the medical advice, diagnosis, or treatment of a health care provider based on the health care provider's examination and assessment of a patient's specific and unique circumstances. Patients must speak with a health care provider for complete information about their health, medical questions, and treatment options, including any risks or benefits  regarding use of medications. This information does not endorse any treatments or medications as safe, effective, or approved for treating a specific patient. UpToDate, Inc. and its affiliates disclaim any warranty or liability relating to this information or the use thereof. The use of this information is governed by the Terms of Use, available at https://www.SeeFuture.[employer]/en/solutions/lexicomp/about/gideon.  Last Reviewed Date   2019-06-06  Copyright   © 2021 UpToDate, Inc. and its affiliates and/or licensors. All rights reserved.

## 2022-02-01 ENCOUNTER — PATIENT MESSAGE (OUTPATIENT)
Dept: PRIMARY CARE CLINIC | Facility: CLINIC | Age: 62
End: 2022-02-01
Payer: MEDICAID

## 2022-02-01 DIAGNOSIS — I15.2 HYPERTENSION ASSOCIATED WITH DIABETES: Primary | ICD-10-CM

## 2022-02-01 DIAGNOSIS — E11.59 HYPERTENSION ASSOCIATED WITH DIABETES: Primary | ICD-10-CM

## 2022-02-01 RX ORDER — OLMESARTAN MEDOXOMIL 5 MG/1
5 TABLET ORAL DAILY
Qty: 30 TABLET | Refills: 1 | Status: SHIPPED | OUTPATIENT
Start: 2022-02-01 | End: 2022-04-08

## 2022-02-01 NOTE — TELEPHONE ENCOUNTER
Sure. Do we know if there is a preferred ARB.  Losartan is on back order presently.     Dr. YEAGER

## 2022-02-01 NOTE — TELEPHONE ENCOUNTER
No, Sorry.  I meant Is there another ARB that is covered on her plan other than Losartan?  I can try for Irbesartan or Olmesartan. Will try Olmesartan 5mg/d and see. Please let pt know.     Dr. burgos

## 2022-03-26 NOTE — TELEPHONE ENCOUNTER
No new care gaps identified.  Powered by [employer] by [facility]. Reference number: [id number].   3/26/2022 3:39:48 AM CDT

## 2022-03-28 NOTE — TELEPHONE ENCOUNTER
Refill Authorization Note   Linn Price  is requesting a refill authorization.  Brief Assessment and Rationale for Refill:  Approve     Medication Therapy Plan:       Medication Reconciliation Completed: No   Comments:   --->Care Gap information included below if applicable.       Requested Prescriptions   Pending Prescriptions Disp Refills    blood sugar diagnostic (ONETOUCH ULTRA TEST) Strp 100 strip 1     Sig: USE TO TEST BLOOD GLUCOSE ONCE DAILY       Endocrinology: Diabetes - Supplies Failed - 3/26/2022  3:39 AM        Failed - Matches previous order       Previous Authorizing Provider: Erika Mcclure MD (blood sugar diagnostic (ONETOUCH ULTRA TEST) Strp)  Previous Pharmacy: [id number] - [place], [place] - [address] AT [facility]            Passed - Patient is at least 18 years old        Passed - Valid encounter within last 15 months     Recent Visits  Date Type Provider Dept   01/21/22 Office Visit Erika Mcclure MD [facility]   07/15/21 Office Visit Erika Mcclure MD [facility]   05/06/21 Office Visit Erika Mcclure MD [facility]   01/15/21 Office Visit Erika Mcclure MD [facility]   12/01/20 Office Visit Erika Mcclure MD [facility]   Showing recent visits within past 720 days and meeting all other requirements  Future Appointments  No visits were found meeting these conditions.  Showing future appointments within next 150 days and meeting all other requirements                Passed - No ED/Hospital visits since last PCP visit     Last PCP Visit: 1/21/2022 Last Admission:  Last ED Visit:           Passed - HBA1C within 1080 days     Lab Results   Component Value Date    HGBA1C 6.0 (H) 07/12/2021    HGBA1C 8.0 (H) 03/12/2021    HGBA1C 13.7 (H) 01/02/2018                  Appointments  past 12m or future 3m with PCP    Date Provider   Last Visit   1/21/2022 Erika Mcclure MD   Next  Visit   Visit date not found Erika Mcclure MD   ED visits in past 90 days: 0     Note composed:7:38 AM 03/28/2022

## 2022-03-30 ENCOUNTER — PATIENT MESSAGE (OUTPATIENT)
Dept: OTHER | Facility: OTHER | Age: 62
End: 2022-03-30
Payer: MEDICAID

## 2022-04-07 DIAGNOSIS — E11.59 HYPERTENSION ASSOCIATED WITH DIABETES: ICD-10-CM

## 2022-04-07 DIAGNOSIS — I15.2 HYPERTENSION ASSOCIATED WITH DIABETES: ICD-10-CM

## 2022-04-07 NOTE — TELEPHONE ENCOUNTER
No new care gaps identified.  Powered by [employer] by [employer]. Reference number: [id number].   4/07/2022 7:06:46 AM CDT

## 2022-04-08 RX ORDER — OLMESARTAN MEDOXOMIL 5 MG/1
TABLET ORAL
Qty: 30 TABLET | Refills: 1 | Status: SHIPPED | OUTPATIENT
Start: 2022-04-08 | End: 2022-06-23 | Stop reason: SDUPTHER

## 2022-04-08 NOTE — TELEPHONE ENCOUNTER
Refill Routing Note   Medication(s) are not appropriate for processing by [facility] for the following reason(s):      - Required vitals are abnormal    [facility] action(s):  Defer          Medication reconciliation completed: No     Appointments  past 12m or future 3m with PCP    Date Provider   Last Visit   1/21/2022 Erika Mcclure MD   Next Visit   Visit date not found Erika Mcclure MD   ED visits in past 90 days: 0        Note composed:11:42 PM 04/07/2022

## 2022-04-25 PROBLEM — Z00.00 NORMAL PHYSICAL EXAM, ROUTINE: Status: RESOLVED | Noted: 2022-01-21 | Resolved: 2022-04-25

## 2022-06-07 LAB
LEFT EYE DM RETINOPATHY: NEGATIVE
RIGHT EYE DM RETINOPATHY: NEGATIVE

## 2022-06-15 ENCOUNTER — PATIENT MESSAGE (OUTPATIENT)
Dept: ADMINISTRATIVE | Facility: HOSPITAL | Age: 62
End: 2022-06-15
Payer: MEDICAID

## 2022-06-15 ENCOUNTER — PATIENT OUTREACH (OUTPATIENT)
Dept: ADMINISTRATIVE | Facility: HOSPITAL | Age: 62
End: 2022-06-15
Payer: MEDICAID

## 2022-06-15 NOTE — PROGRESS NOTES
Non-compliant report chart audits for HYPERTENSION MANAGEMENT    Outreach to patient in reference to hypertension management    RE:  Patient hypertension management    Last B/P 150/80; Portal message sent  Patient has upcoming PCP appointment 6/23/22    Outreach:  Hypertension Management

## 2022-06-16 ENCOUNTER — PATIENT OUTREACH (OUTPATIENT)
Dept: ADMINISTRATIVE | Facility: HOSPITAL | Age: 62
End: 2022-06-16
Payer: MEDICAID

## 2022-06-16 ENCOUNTER — PATIENT MESSAGE (OUTPATIENT)
Dept: ADMINISTRATIVE | Facility: HOSPITAL | Age: 62
End: 2022-06-16
Payer: MEDICAID

## 2022-06-16 NOTE — PROGRESS NOTES
Patient due for the following    COVID-19 Vaccine (1)    HIV Screening     Low Dose Statin     Shingles Vaccine (1 of 2)    Foot Exam     Colorectal Cancer Screening     Mammogram     Hemoglobin A1c     Diabetes Urine Screening       Received dm eye exam records.  Scanned to media.  updated    Immunizations: reviewed and updated  Care Everywhere: triggered  Care Teams: up to date  Outreach: completed

## 2022-06-21 LAB
ALBUMIN: 4.7 G/DL (ref 3.5–5)
ALP SERPL-CCNC: 104 U/L (ref 38–126)
ALT SERPL W P-5'-P-CCNC: 16 U/L (ref 7–56)
ANION GAP SERPL CALC-SCNC: 14.9 MMOL/L (ref 9–18)
AST SERPL-CCNC: 16 U/L (ref 7–40)
BASOPHILS ABSOLUTE COUNT: 0 THOUSAND/UL (ref 0–0.2)
BASOPHILS NFR BLD: 0.4 % (ref 0–2)
BILIRUB SERPL-MCNC: 0.3 MG/DL (ref 0–1.2)
BUN BLD-MCNC: 13 MG/DL (ref 7–21)
BUN/CREAT SERPL: 21 (ref 6–22)
CALC OSMOLALITY: 284 MOSM/KG (ref 275–295)
CALCIUM SERPL-MCNC: 9.6 MG/DL (ref 8.5–10.3)
CHLORIDE SERPL-SCNC: 103 MMOL/L (ref 98–107)
CHOL/HDLC RATIO: 3.2
CHOLEST SERPL-MSCNC: 179 MG/DL (ref 100–200)
CO2 SERPL-SCNC: 28 MMOL/L (ref 21–31)
CREAT SERPL-MCNC: 0.62 MG/DL (ref 0.5–1)
EGFR: 110 ML/MIN
EOSINOPHIL NFR BLD: 2.3 % (ref 0–4)
EOSINOPHILS ABSOLUTE COUNT: 0.2 THOUSAND/UL (ref 0–0.7)
ERYTHROCYTE [DISTWIDTH] IN BLOOD BY AUTOMATED COUNT: 13.9 % (ref 12–15.3)
FREE T4: 1.11 NG/DL (ref 0.9–1.7)
GFR: 95 ML/MIN
GLUCOSE SERPL-MCNC: 106 MG/DL (ref 70–100)
HBA1C MFR BLD: 7.1 % (ref 4.5–5.7)
HCT VFR BLD AUTO: 34.4 % (ref 37–47)
HCV AB S/CO SERPL IA: 0.01
HCV AB SERPL QL IA: NORMAL
HDLC SERPL-MCNC: 56 MG/DL (ref 40–75)
HGB BLD-MCNC: 11.3 GM/DL (ref 12–16)
LDLC SERPL CALC-MCNC: 109 MG/DL (ref 0–125)
LYMPHOCYTES %: 30.4 % (ref 15–45)
LYMPHOCYTES ABSOLUTE COUNT: 2.4 THOUSAND/UL (ref 1–4.2)
MCH RBC QN AUTO: 27.6 PG (ref 27–33)
MCHC RBC AUTO-ENTMCNC: 33 G/DL (ref 32–36)
MCV RBC AUTO: 83.6 FL (ref 81–99)
MONOCYTES %: 5.9 % (ref 3–13)
MONOCYTES ABSOLUTE COUNT: 0.5 THOUSAND/UL (ref 0.1–0.8)
NEUTROPHILS ABSOLUTE COUNT: 4.9 THOUSAND/UL (ref 2.1–7.6)
NEUTROPHILS RELATIVE PERCENT: 61 % (ref 32–80)
PLATELET # BLD AUTO: 349 THOUSAND/UL (ref 150–350)
PMV BLD AUTO: 8 FL (ref 7–10.2)
POTASSIUM SERPL-SCNC: 3.9 MMOL/L (ref 3.5–5)
RBC # BLD AUTO: 4.12 MILLION/UL (ref 4.2–5.4)
SODIUM BLD-SCNC: 142 MMOL/L (ref 135–145)
TOTAL PROTEIN: 7.6 G/DL (ref 6.3–8.2)
TRIGL SERPL-MCNC: 86 MG/DL (ref 30–150)
TSH SERPL DL<=0.005 MIU/L-ACNC: 1.4 UIU/ML (ref 0.35–4)
WBC # BLD AUTO: 8.1 THOUSAND/UL (ref 4.5–11)

## 2022-06-23 ENCOUNTER — OFFICE VISIT (OUTPATIENT)
Dept: PRIMARY CARE CLINIC | Facility: CLINIC | Age: 62
End: 2022-06-23
Payer: MEDICAID

## 2022-06-23 VITALS
WEIGHT: 214.75 LBS | HEIGHT: 65 IN | TEMPERATURE: 98 F | BODY MASS INDEX: 35.78 KG/M2 | OXYGEN SATURATION: 98 % | DIASTOLIC BLOOD PRESSURE: 82 MMHG | RESPIRATION RATE: 18 BRPM | HEART RATE: 74 BPM | SYSTOLIC BLOOD PRESSURE: 143 MMHG

## 2022-06-23 DIAGNOSIS — R91.8 MULTIPLE PULMONARY NODULES: ICD-10-CM

## 2022-06-23 DIAGNOSIS — J44.9 CHRONIC OBSTRUCTIVE PULMONARY DISEASE, UNSPECIFIED COPD TYPE: ICD-10-CM

## 2022-06-23 DIAGNOSIS — D64.9 NORMOCYTIC ANEMIA: ICD-10-CM

## 2022-06-23 DIAGNOSIS — E11.9 TYPE 2 DIABETES MELLITUS WITHOUT COMPLICATION, WITHOUT LONG-TERM CURRENT USE OF INSULIN: ICD-10-CM

## 2022-06-23 DIAGNOSIS — G47.33 OSA ON CPAP: ICD-10-CM

## 2022-06-23 DIAGNOSIS — R60.9 EDEMA, UNSPECIFIED TYPE: ICD-10-CM

## 2022-06-23 DIAGNOSIS — Z91.018 FOOD ALLERGY: ICD-10-CM

## 2022-06-23 DIAGNOSIS — E66.01 CLASS 2 SEVERE OBESITY DUE TO EXCESS CALORIES WITH SERIOUS COMORBIDITY AND BODY MASS INDEX (BMI) OF 35.0 TO 35.9 IN ADULT: ICD-10-CM

## 2022-06-23 DIAGNOSIS — E78.5 HYPERLIPIDEMIA, UNSPECIFIED HYPERLIPIDEMIA TYPE: ICD-10-CM

## 2022-06-23 DIAGNOSIS — K63.5 POLYP OF COLON, UNSPECIFIED PART OF COLON, UNSPECIFIED TYPE: ICD-10-CM

## 2022-06-23 DIAGNOSIS — I15.2 HYPERTENSION ASSOCIATED WITH DIABETES: Primary | ICD-10-CM

## 2022-06-23 DIAGNOSIS — E11.59 HYPERTENSION ASSOCIATED WITH DIABETES: Primary | ICD-10-CM

## 2022-06-23 LAB
ALBUMIN/CREAT UR: 18 MCG/MG CREAT
CREAT UR-MCNC: 11 MG/DL (ref 20–275)
MICROALBUMIN UR-MCNC: 0.2 MG/DL

## 2022-06-23 PROCEDURE — 3008F BODY MASS INDEX DOCD: CPT | Mod: CPTII,,, | Performed by: INTERNAL MEDICINE

## 2022-06-23 PROCEDURE — 1159F MED LIST DOCD IN RCRD: CPT | Mod: CPTII,,, | Performed by: INTERNAL MEDICINE

## 2022-06-23 PROCEDURE — 3079F PR MOST RECENT DIASTOLIC BLOOD PRESSURE 80-89 MM HG: ICD-10-PCS | Mod: CPTII,,, | Performed by: INTERNAL MEDICINE

## 2022-06-23 PROCEDURE — 3077F PR MOST RECENT SYSTOLIC BLOOD PRESSURE >= 140 MM HG: ICD-10-PCS | Mod: CPTII,,, | Performed by: INTERNAL MEDICINE

## 2022-06-23 PROCEDURE — 1160F RVW MEDS BY RX/DR IN RCRD: CPT | Mod: CPTII,,, | Performed by: INTERNAL MEDICINE

## 2022-06-23 PROCEDURE — 99214 PR OFFICE/OUTPT VISIT, EST, LEVL IV, 30-39 MIN: ICD-10-PCS | Mod: S$PBB,,, | Performed by: INTERNAL MEDICINE

## 2022-06-23 PROCEDURE — 3051F PR MOST RECENT HEMOGLOBIN A1C LEVEL 7.0 - < 8.0%: ICD-10-PCS | Mod: CPTII,,, | Performed by: INTERNAL MEDICINE

## 2022-06-23 PROCEDURE — 99215 OFFICE O/P EST HI 40 MIN: CPT | Mod: PBBFAC,PN | Performed by: INTERNAL MEDICINE

## 2022-06-23 PROCEDURE — 3051F HG A1C>EQUAL 7.0%<8.0%: CPT | Mod: CPTII,,, | Performed by: INTERNAL MEDICINE

## 2022-06-23 PROCEDURE — 1160F PR REVIEW ALL MEDS BY PRESCRIBER/CLIN PHARMACIST DOCUMENTED: ICD-10-PCS | Mod: CPTII,,, | Performed by: INTERNAL MEDICINE

## 2022-06-23 PROCEDURE — 1159F PR MEDICATION LIST DOCUMENTED IN MEDICAL RECORD: ICD-10-PCS | Mod: CPTII,,, | Performed by: INTERNAL MEDICINE

## 2022-06-23 PROCEDURE — 99999 PR PBB SHADOW E&M-EST. PATIENT-LVL V: CPT | Mod: PBBFAC,,, | Performed by: INTERNAL MEDICINE

## 2022-06-23 PROCEDURE — 3079F DIAST BP 80-89 MM HG: CPT | Mod: CPTII,,, | Performed by: INTERNAL MEDICINE

## 2022-06-23 PROCEDURE — 3077F SYST BP >= 140 MM HG: CPT | Mod: CPTII,,, | Performed by: INTERNAL MEDICINE

## 2022-06-23 PROCEDURE — 3008F PR BODY MASS INDEX (BMI) DOCUMENTED: ICD-10-PCS | Mod: CPTII,,, | Performed by: INTERNAL MEDICINE

## 2022-06-23 PROCEDURE — 99214 OFFICE O/P EST MOD 30 MIN: CPT | Mod: S$PBB,,, | Performed by: INTERNAL MEDICINE

## 2022-06-23 PROCEDURE — 4010F ACE/ARB THERAPY RXD/TAKEN: CPT | Mod: CPTII,,, | Performed by: INTERNAL MEDICINE

## 2022-06-23 PROCEDURE — 4010F PR ACE/ARB THEARPY RXD/TAKEN: ICD-10-PCS | Mod: CPTII,,, | Performed by: INTERNAL MEDICINE

## 2022-06-23 PROCEDURE — 99999 PR PBB SHADOW E&M-EST. PATIENT-LVL V: ICD-10-PCS | Mod: PBBFAC,,, | Performed by: INTERNAL MEDICINE

## 2022-06-23 RX ORDER — OLMESARTAN MEDOXOMIL 5 MG/1
5 TABLET ORAL DAILY
Qty: 30 TABLET | Refills: 1 | Status: SHIPPED | OUTPATIENT
Start: 2022-06-23 | End: 2022-08-21

## 2022-06-23 RX ORDER — METFORMIN HYDROCHLORIDE 500 MG/1
TABLET, EXTENDED RELEASE ORAL
Qty: 180 TABLET | Refills: 1 | Status: SHIPPED | OUTPATIENT
Start: 2022-06-23 | End: 2023-02-22

## 2022-06-23 RX ORDER — HYDROCHLOROTHIAZIDE 12.5 MG/1
TABLET ORAL
Qty: 30 TABLET | Refills: 0 | Status: SHIPPED | OUTPATIENT
Start: 2022-06-23 | End: 2022-07-22

## 2022-06-23 RX ORDER — ALBUTEROL SULFATE 90 UG/1
2 AEROSOL, METERED RESPIRATORY (INHALATION) EVERY 6 HOURS PRN
Qty: 18 G | Refills: 2 | Status: SHIPPED | OUTPATIENT
Start: 2022-06-23 | End: 2022-09-16

## 2022-06-23 RX ORDER — CIPROFLOXACIN AND DEXAMETHASONE 3; 1 MG/ML; MG/ML
SUSPENSION/ DROPS AURICULAR (OTIC)
COMMUNITY
Start: 2022-05-27 | End: 2023-07-20

## 2022-06-23 RX ORDER — LANCETS
EACH MISCELLANEOUS
Qty: 200 EACH | Refills: 2 | Status: SHIPPED | OUTPATIENT
Start: 2022-06-23

## 2022-06-23 RX ORDER — EPINEPHRINE 0.3 MG/.3ML
1 INJECTION SUBCUTANEOUS ONCE
Qty: 2 EACH | Refills: 0 | Status: SHIPPED | OUTPATIENT
Start: 2022-06-23 | End: 2023-10-10 | Stop reason: SDUPTHER

## 2022-06-23 RX ORDER — INSULIN PUMP SYRINGE, 3 ML
EACH MISCELLANEOUS
Qty: 1 EACH | Refills: 0 | Status: SHIPPED | OUTPATIENT
Start: 2022-06-23 | End: 2023-06-23

## 2022-06-23 NOTE — PROGRESS NOTES
[facility] Note    Chief Complaint      Chief Complaint   Patient presents with    Follow-up     Pt refuse to take b/p        History of Present Illness      Linn Price is a 62 y.o.  WF with HTN, DM - II, JANAE on CPAP, Chronic Constipation, GERD, Colon polyps,Pulm nodule, and Nicotine Dependence in Remission presents to fu well visit and chronic issues. Last virtual visit - 1/21/22    Pulm nodules - CT Chest -11/29/21 - Stable 7 mm right middle lobe pulmonary nodule. Normal heart size.  Trace pericardial fluid which is not a concern and nothing she needs to do anything about. Return to yearly screening low-dose lung cancer screening CT. Will repeat in Dec 2022     Thyroid nodule - Thyroid U/S - 5/25/21- small (9mm) nodule that does not require further intervention of follow up.     COPD - Pt with prev PFT's at  - + Obstruction but refused bronchodilator. +SOB at times.      Nicotine Dependence -in remission - Pt quit 1/12/20.  Congratulated on cessation.      HTN - She saw Cards at .  Recent CT showed - A small pericardial effusion is present.  Started amlodipine 2.5 mg/d which she stopped because she thought it was worsening her Skin condition. Stopping it did not change the skin issues.  She is fu by digital BP monitoring program. She is undergoing carnes with Cards.  Cont to monitor.   Pt fu A/I carnes for recent food allergies with lip swelling first.  Pt ran out of olmesartan 5 mg/d and plans to resume tomorrow. Pt would like a fluid pill to help with intermittent edema. She will monitor BP closely and alert me for any concerns.  Will Rx HCTZ 12.5 mg daily prn edema.      Left renal cyst - 1.8 cm simple parapelvic cyst. Lower pole Left kidney.     DM - II -  Ha1c -7.1 -  Cont Metformin  mg QHS.  Will inc to 2 tabs po QHS. no evidence of microalbuminuria.        JANAE on CPAP - She uses CPAP it nightly x 6-7 hrs. Rec low carb diet and exercise for wt loss. Doing  well.      Chronic constipation - Doing well at present.  Rec adeq hydration.  Eating a yogurt daily has helped.      Colon polyps - Fu by Dr. Swain. Due. She is planning to sched. When insurance changes back in a 1 month.     GERD - Rare. Rec reflux prec. Can take Pepcid prn-rarely needs it.      Vit D def - 30 up from 14 - Pt completed Ergocalciferol once weekly. Pt currently on Vit D3 4000 u/d.       Dishydrotic Eczema- Dx via Bx. Fu by Derm - Dr. Sky. Doing better.  She is on light therapy.  She can walk and use shoes now.  She is also on topicals.      Chronic Otitis Externa - Fu by ENT, Dr. David. Resolved. Doing well.      Obesity - BMI - 35.73 Up 13 lb since jan. Rec low carb diet and exercising.   Plans to resume exercise     HLD - Pt has not  started her Lipitor.   Atherosclerosis including coronary arteries.  I had rec she start her Lipitor, Aspirin 81mg. She declines statin for now. She is undergoing carnes with Cards.  Her cholesterol was not quite controlled  Her goal LDL is < 70. Cont to monitor.    Normocytic anemia - H/H - 11.3/34.4 - cont to monitor. Rec set up C-scope     Lab review:   5/22/20 - WBC - 7.6;  H/H - 12.1/36.3; Plt - 338;  BUN/Cr - 12/0.7; LFt's - wnl;   TG 80; Ha1c - 6.9; TSH - 1.52;  Vit D -14      HCM - Flu - none - refuses;  Tdap -10/28/15;  PCV 13 - none - declines;  PVX 23 - none - declines;  Shingrix - none - declines;  COVID -19 vaccine #1 - declines; MGM - 12/1/20 - neg - will order;  DEXA - 7/23/21 - wnl;  PAP -7/6/21 - neg; Hep C Screen - 6/20/22 - neg;  HIV Screen - none - defers; C-scope - 6/11/18 - repeat 3 yrs - due;  Prev PCP - me at [facility] and then Dr. Armenta;  OB/GYN - Dr. Catalan;  GI - Dr. Swain; A/I - Dr. Cordova;  ENT- Dr. David; Derm - Dr. Downey; Ophtho - Dr. Shin; Rheum - Dr. Campoverde; Cards - ? At [facility]; well visit - 1/21/22    Patient Care Team:  Erika Mcclure MD as PCP - General (Internal Medicine)  Brent Wilosn MD as Obstetrician  (Obstetrics)  Santana Swain MD as Consulting Physician (Gastroenterology)  Daniel Bynum MD as Consulting Physician (General Surgery)  Eugene Fontana Jr., MD as Consulting Physician (Otolaryngology)  Chong Tang MD as Consulting Physician (Ophthalmology)  Melissa Austin MA as Care Coordinator  Julianne Narvaez as Digital Medicine Health   Charlotte Hoyt PharmD as Hypertension Digital Medicine Clinician  Erika Mcclure MD as Hypertension Digital Medicine Responsible Provider (Internal Medicine)  Charlotte Hoyt PharmD as Diabetes Digital Medicine Clinician  Erika Mcclure MD as Diabetes Digital Medicine Responsible Provider (Internal Medicine)  McIp as Hypertension Digital Medicine Contract  McIp as Diabetes Digital Medicine Contract     Health Maintenance:    There is no immunization history on file for this patient.   Health Maintenance   Topic Date Due    Low Dose Statin  Never done    Foot Exam  09/10/2015    Mammogram  12/01/2021    LDCT Lung Screen  11/29/2022    Hemoglobin A1c  12/20/2022    Eye Exam  06/07/2023    Lipid Panel  06/20/2023    DEXA Scan  07/23/2023    TETANUS VACCINE  10/28/2025    Hepatitis C Screening  Completed        Past Medical History:  Past Medical History:   Diagnosis Date    Benign hypertension 8/19/2014    Broken fingers     Broken toe     Colon polyps 8/19/2014    Diabetes mellitus     Elevated fasting glucose 8/19/2014    Family history of bladder cancer     Family history of heart disease 8/19/2014    History of broken nose     Menopause 2009    JANAE on CPAP 11/15/2007    Per PSG: Rx'd CPAP, pressure 13, using small Comfort Select mask or interface of patient's choice, chin strap, and heated humidifier      Pulmonary nodule 12/1/2020    Pyloric stenosis     as a child    Reflux esophagitis 8/19/2014    Per EGD 8/9/2006      Tobacco abuse     Ulcer     at 14 y.o.       Past Surgical History:   has a past surgical history that  includes Gastric restriction surgery; [place] tooth extraction; Upper gastrointestinal endoscopy (2006); Colonoscopy (2018); and Tonsillectomy.    Family History:  family history includes Bladder Cancer in her maternal uncle; COPD in her sister; Dementia in her mother; Diabetes in her father and sister; Hypertension in her brother, mother, and sister; Kidney disease in her mother; Macular degeneration in her mother; Prostate cancer in her father; Psoriasis in her mother; Stroke (age of onset: 60) in her mother; Stroke (age of onset: 75) in her father.     Social History:  Social History     Tobacco Use    Smoking status: Former Smoker     Packs/day: 1.50     Years: 40.00     Pack years: 60.00     Types: Cigarettes     Start date: 3/17/1973     Quit date: 2020     Years since quittin.4    Smokeless tobacco: Never Used   Substance Use Topics    Alcohol use: Yes     Comment: social - rare    Drug use: Never       Review of Systems   Constitutional: Positive for diaphoresis. Negative for chills and fever.        Not to the point she changes her clothes or sheets     HENT: Positive for ear pain and hearing loss. Negative for nasal congestion and sore throat.         Ear pain resolved 3 days ago.    Eyes: Negative for visual disturbance.   Respiratory: Negative for chest tightness and shortness of breath.    Cardiovascular: Negative for chest pain.   Gastrointestinal: Negative for abdominal pain, constipation, diarrhea, nausea and vomiting.   Endocrine: Negative for cold intolerance, heat intolerance and polydipsia.   Genitourinary: Negative for dysuria and frequency.   Musculoskeletal: Negative for arthralgias and myalgias.   Neurological: Negative for dizziness and headaches.   Psychiatric/Behavioral: Negative for dysphoric mood. The patient is not nervous/anxious.         Medications:    Current Outpatient Medications:     acetic acid-hydrocortisone (VOSOL-HC) otic solution, INT 5 GTS INTO AU 3 XD FOR 10  DAYS, Disp: , Rfl: 3    ascorbic acid, vitamin C, (VITAMIN C) 100 MG tablet, Take 100 mg by mouth once daily., Disp: , Rfl:     aspirin 81 MG Chew, Take 1 tablet (81 mg total) by mouth once daily., Disp: , Rfl: 0    blood sugar diagnostic (ONETOUCH ULTRA TEST) Strp, USE TO TEST BLOOD GLUCOSE ONCE DAILY, Disp: 100 strip, Rfl: 1    blood sugar diagnostic Strp, Accu-Chek Rashida Plus test strips, Disp: , Rfl:     calcipotriene (DOVONOX) 0.005 % cream, Apply topically 2 (two) times daily. To affected areas on hands and feet., Disp: 120 g, Rfl: 3    clobetasol 0.05% (TEMOVATE) 0.05 % Oint, AAA on hands and feet BID  x 1-2 wks then prn flares only, Disp: 60 g, Rfl: 3    lancets (ONETOUCH DELICA PLUS LANCET) 30 gauge Misc, TO CHECK BLOOD GLUCOSE ONCE DAILY, Disp: 100 each, Rfl: 1    multivitamin capsule, Take 1 capsule by mouth once daily., Disp: , Rfl:     ONETOUCH DELICA PLUS LANCET 30 gauge Misc, TO CHECK BLOOD GLUCOSE ONCE DAILY, Disp: , Rfl:     ONETOUCH ULTRA2 METER Misc, USE TO CHECK BLOOD GLUCOSE ONCE DAILY, Disp: , Rfl:     prednisoLONE acetate (PRED FORTE) 1 % DrpS, , Disp: , Rfl:     sulfacetamide sodium 10% (BLEPH-10) 10 % ophthalmic solution, INSTILL 10 GTS AU TID FOR 7 DAYS, Disp: , Rfl: 5    triamcinolone acetonide 0.1% (KENALOG) 0.1 % ointment, APPLY TO THE AFFECTED AREA ON HANDS AND FEET TWICE DAILY FOR 14 DAYS, Disp: , Rfl:     vitamin D (VITAMIN D3) 1000 units Tab, Take 1,000 Units by mouth once daily., Disp: , Rfl:     zinc gluconate 50 mg tablet, Take 50 mg by mouth once daily., Disp: , Rfl:     albuterol (PROAIR HFA) 90 mcg/actuation inhaler, Inhale 2 puffs into the lungs every 6 (six) hours as needed for Wheezing. Admin with spacer, Disp: 18 g, Rfl: 2    blood sugar diagnostic Strp, To check BG 2 times daily, to use with insurance preferred meter, Disp: 200 strip, Rfl: 3    blood-glucose meter kit, To check BG 2 times daily, to use with insurance preferred meter, Disp: 1 each, Rfl:  "0    CIPRODEX 0.3-0.1 % DrpS, SHAKE LIQUID AND INSTILL 4 DROPS TO LEFT EAR TWICE DAILY FOR 7 DAYS, Disp: , Rfl:     ELIDEL 1 % cream, Apply topically., Disp: , Rfl:     EPINEPHrine (EPIPEN) 0.3 mg/0.3 mL AtIn, Inject 0.3 mLs (0.3 mg total) into the muscle once. for 1 dose, Disp: 2 each, Rfl: 0    hydroCHLOROthiazide (HYDRODIURIL) 12.5 MG Tab, 1 po Q day prn edema, Disp: 30 tablet, Rfl: 0    lancets Misc, To check BG 2 times daily, to use with insurance preferred meter, Disp: 200 each, Rfl: 2    metFORMIN (GLUCOPHAGE-XR) 500 MG ER 24hr tablet, 2 tabs po QHS, Disp: 180 tablet, Rfl: 1    olmesartan (BENICAR) 5 MG Tab, Take 1 tablet (5 mg total) by mouth once daily., Disp: 30 tablet, Rfl: 1     Allergies:  Review of patient's allergies indicates:   Allergen Reactions    Asparagus Anxiety, Dermatitis, Hives, Itching, Rash and Swelling    Codeine Other (See Comments)     psychosis       Physical Exam      Vital Signs  Temp: 97.8 °F (36.6 °C)  Pulse: 74  Resp: 18  SpO2: 98 %  BP: (P) 130/80  BP Location: (P) Right arm  Patient Position: (P) Sitting  Pain Score: 0-No pain  Height and Weight  Height: 5' 5" (165.1 cm)  Weight: 97.4 kg (214 lb 11.7 oz)  BSA (Calculated - sq m): 2.11 sq meters  BMI (Calculated): 35.7  Weight in (lb) to have BMI = 25: 149.9      Patient Position: (P) Sitting      Physical Exam  Vitals reviewed.   Constitutional:       General: She is not in acute distress.     Appearance: Normal appearance. She is obese. She is not ill-appearing, toxic-appearing or diaphoretic.   HENT:      Head: Normocephalic and atraumatic.      Right Ear: Tympanic membrane normal.      Ears:      Comments: No pain on manipulation of ext ear. No erythema,  Slight debris to Left ear canal; dry skin to RT ear canal  Eyes:      Extraocular Movements: Extraocular movements intact.      Conjunctiva/sclera: Conjunctivae normal.      Pupils: Pupils are equal, round, and reactive to light.   Neck:      Vascular: No carotid " bruit.   Cardiovascular:      Rate and Rhythm: Normal rate and regular rhythm.      Pulses: Normal pulses.      Heart sounds: Normal heart sounds. No murmur heard.     Comments: Trace edema to BLE  Pulmonary:      Effort: No respiratory distress.      Breath sounds: Normal breath sounds. No wheezing.   Abdominal:      General: Bowel sounds are normal. There is no distension.      Palpations: Abdomen is soft.      Tenderness: There is no abdominal tenderness. There is no guarding or rebound.   Skin:     General: Skin is warm and dry.      Comments: Excoriations to upper back; spider veins to BLE   Neurological:      General: No focal deficit present.      Mental Status: She is alert and oriented to person, place, and time.   Psychiatric:         Mood and Affect: Mood normal.         Behavior: Behavior normal.          Laboratory:  CBC:  Recent Labs   Lab 06/20/22  1528   WBC 8.1   RBC 4.12 L   Hemoglobin 11.3 L   Hematocrit 34.4 L   Platelets 349   MCV 83.6   MCH 27.6   MCHC 33.0       CMP:  Recent Labs   Lab 03/12/21  1113 05/01/21  0828 06/20/22  1528   Glucose 114 H   < > 106 H   Calcium 10.2   < > 9.6   ALBUMIN 4.8  --  4.7   Total Protein 8.1  --  7.6   Sodium 144   < > 142   Potassium 4.9   < > 3.9   CO2 28   < > 28   Chloride 102   < > 103   BUN 14   < > 13.0   Creatinine 0.7   < > 0.62   Alkaline Phosphatase 106  --  104   ALT 19  --  16   AST 19  --  16   Total Bilirubin 0.4  --  0.3    < > = values in this interval not displayed.        LIPIDS:  Recent Labs   Lab 03/12/21  1113 07/12/21  0000 06/20/22  1528   TSH  --   --  1.40   HDL 57 62 56   Cholesterol 202 H 175 179   Triglycerides 98 82 86   LDL Calculated 129 H 100 109   Non-HDL Cholesterol 145 H 113  --        TSH:  Recent Labs   Lab 06/20/22  1528   TSH 1.40       A1C:  Recent Labs   Lab 03/12/21  1113 07/12/21  0000 06/20/22  1528   Hemoglobin A1C 8.0 H 6.0 H 7.1 H       Other:   Recent Labs   Lab 07/06/21  1553   Vit D, 25-Hydroxy 30   Ferritin  121   Iron 38   Transferrin 280   TIBC 414   Saturated Iron 9 L     Recent Labs   Lab 06/20/22  1528   Hepatitis C Ab NON-REACTIVE       Assessment/Plan     Linn Price is a 62 y.o.female with:    Hypertension associated with diabetes  -     olmesartan (BENICAR) 5 MG Tab; Take 1 tablet (5 mg total) by mouth once daily.  Dispense: 30 tablet; Refill: 1  - Pt ran out of olmesartan 5 mg/d and plans to resume tomorrow. Pt would like a fluid pill to help with intermittent edema. She will monitor BP closely and alert me for any concerns.  Will Rx HCTZ 12.5 mg daily prn edema.     Chronic obstructive pulmonary disease, unspecified COPD type  -     albuterol (PROAIR HFA) 90 mcg/actuation inhaler; Inhale 2 puffs into the lungs every 6 (six) hours as needed for Wheezing. Admin with spacer  Dispense: 18 g; Refill: 2  - Stable.  Cont current regimen.    Type 2 diabetes mellitus without complication, without long-term current use of insulin  -     metFORMIN (GLUCOPHAGE-XR) 500 MG ER 24hr tablet; 2 tabs po QHS  Dispense: 180 tablet; Refill: 1  -     blood-glucose meter kit; To check BG 2 times daily, to use with insurance preferred meter  Dispense: 1 each; Refill: 0  -     lancets Misc; To check BG 2 times daily, to use with insurance preferred meter  Dispense: 200 each; Refill: 2  -     blood sugar diagnostic Strp; To check BG 2 times daily, to use with insurance preferred meter  Dispense: 200 strip; Refill: 3  - Will inc Metformin to 2 tabs QHS.  Rec start low carb diet. She has been eating poorly (vending machine food) due to busy work sched.     Edema, unspecified type  -     hydroCHLOROthiazide (HYDRODIURIL) 12.5 MG Tab; 1 po Q day prn edema  Dispense: 30 tablet; Refill: 0  -  Pt would like a fluid pill to help with intermittent edema. She will monitor BP closely and alert me for any concerns.  Will Rx HCTZ 12.5 mg daily prn edema.     JANAE on CPAP  - Stable.  Cont current regimen. Rec diet and exercise as discussed for  wt loss.      Hyperlipidemia, unspecified hyperlipidemia type  - Pt declinws statin. Goal LDL< 70.     Multiple pulmonary nodules  - Stable.  Cont current regimen. Repeat CT chest annually.     Polyp of colon, unspecified part of colon, unspecified type  - Pt plans to set up C-scope in 1 month when gets new insurance plan.     Normocytic anemia  - Repeat next visit.  Set up C-scope.     Class 2 severe obesity due to excess calories with serious comorbidity and body mass index (BMI) of 35.0 to 35.9 in adult  - Rec diet and exercise as discussed for wt loss.      Food allergy  -     EPINEPHrine (EPIPEN) 0.3 mg/0.3 mL AtIn; Inject 0.3 mLs (0.3 mg total) into the muscle once. for 1 dose  Dispense: 2 each; Refill: 0         Chronic conditions status updated as per HPI.  Other than changes above, cont current medications and maintain follow up with specialists.  Follow up in about 4 months (around 10/23/2022) for fu chronic issues or sooner if needed.      Erika Mcclure MD  [facility]

## 2022-06-24 DIAGNOSIS — I15.2 HYPERTENSION ASSOCIATED WITH DIABETES: ICD-10-CM

## 2022-06-24 DIAGNOSIS — E11.59 HYPERTENSION ASSOCIATED WITH DIABETES: ICD-10-CM

## 2022-06-24 RX ORDER — OLMESARTAN MEDOXOMIL 5 MG/1
TABLET ORAL
Qty: 30 TABLET | Refills: 1 | OUTPATIENT
Start: 2022-06-24

## 2022-06-24 NOTE — PROGRESS NOTES
I sent pt a my chart message -  Good news! I reviewed your labs and you had no evidence of microalbuminuria (small protein in your urine from your Diabetes).       Dr. YEAGER

## 2022-06-24 NOTE — TELEPHONE ENCOUNTER
No new care gaps identified.  [facility] Embedded Care Gaps. Reference number: [id number]. 6/24/2022   4:01:01 AM VENESSA

## 2022-06-24 NOTE — TELEPHONE ENCOUNTER
Refill Decision Note   Linn Albert  is requesting a refill authorization.  Brief Assessment and Rationale for Refill:  Quick Discontinue     Medication Therapy Plan:       Medication Reconciliation Completed: No   Comments:     No Care Gaps recommended.     Note composed:12:13 PM 06/24/2022

## 2022-07-20 ENCOUNTER — PATIENT MESSAGE (OUTPATIENT)
Dept: OTHER | Facility: OTHER | Age: 62
End: 2022-07-20
Payer: MEDICAID

## 2022-08-23 ENCOUNTER — TELEPHONE (OUTPATIENT)
Dept: PRIMARY CARE CLINIC | Facility: CLINIC | Age: 62
End: 2022-08-23
Payer: MEDICAID

## 2022-08-23 DIAGNOSIS — I15.2 HYPERTENSION ASSOCIATED WITH DIABETES: ICD-10-CM

## 2022-08-23 DIAGNOSIS — E11.59 HYPERTENSION ASSOCIATED WITH DIABETES: ICD-10-CM

## 2022-08-23 RX ORDER — OLMESARTAN MEDOXOMIL 5 MG/1
5 TABLET ORAL DAILY
Qty: 90 TABLET | Refills: 0 | Status: SHIPPED | OUTPATIENT
Start: 2022-08-23 | End: 2023-01-18

## 2022-09-01 ENCOUNTER — PATIENT MESSAGE (OUTPATIENT)
Dept: OTHER | Facility: OTHER | Age: 62
End: 2022-09-01
Payer: MEDICAID

## 2022-10-04 ENCOUNTER — PATIENT MESSAGE (OUTPATIENT)
Dept: OTHER | Facility: OTHER | Age: 62
End: 2022-10-04
Payer: MEDICAID

## 2023-02-07 DIAGNOSIS — Z00.00 ENCOUNTER FOR MEDICARE ANNUAL WELLNESS EXAM: ICD-10-CM

## 2023-02-09 DIAGNOSIS — Z00.00 ENCOUNTER FOR MEDICARE ANNUAL WELLNESS EXAM: ICD-10-CM

## 2023-02-22 DIAGNOSIS — E11.9 TYPE 2 DIABETES MELLITUS WITHOUT COMPLICATION, WITHOUT LONG-TERM CURRENT USE OF INSULIN: ICD-10-CM

## 2023-02-22 RX ORDER — METFORMIN HYDROCHLORIDE 500 MG/1
TABLET, EXTENDED RELEASE ORAL
Qty: 180 TABLET | Refills: 0 | Status: SHIPPED | OUTPATIENT
Start: 2023-02-22 | End: 2023-05-25

## 2023-02-22 NOTE — TELEPHONE ENCOUNTER
Refill Routing Note   Medication(s) are not appropriate for processing by [facility] for the following reason(s):       Required labs outdated    [facility] action(s):  Defer   Requires labs : Yes         Appointments  past 12m or future 3m with PCP    Date Provider   Last Visit   6/23/2022 Erika Mcclure MD   Next Visit   Visit date not found Erika Mcclure MD   ED visits in past 90 days: 0        Note composed:10:18 AM 02/22/2023

## 2023-02-22 NOTE — TELEPHONE ENCOUNTER
Care Due:                  Date            Visit Type   Department     Provider  --------------------------------------------------------------------------------                                EP -                              PRIMARY      [facility] PRIMARY  Last Visit: 06-      CARE ([facility])   CARE           Erika Mcclure  Next Visit: None Scheduled  None         None Found                                                            Last  Test          Frequency    Reason                     Performed    Due Date  --------------------------------------------------------------------------------    HBA1C.......  6 months...  metFORMIN................  06- 12-    [facility] Embedded Care Gaps. Reference number: [id number]. 2/22/2023   3:41:33 AM CST

## 2023-05-25 DIAGNOSIS — E11.9 TYPE 2 DIABETES MELLITUS WITHOUT COMPLICATION, WITHOUT LONG-TERM CURRENT USE OF INSULIN: ICD-10-CM

## 2023-05-25 RX ORDER — METFORMIN HYDROCHLORIDE 500 MG/1
TABLET, EXTENDED RELEASE ORAL
Qty: 60 TABLET | Refills: 0 | Status: SHIPPED | OUTPATIENT
Start: 2023-05-25 | End: 2023-06-22

## 2023-05-25 RX ORDER — METFORMIN HYDROCHLORIDE 500 MG/1
TABLET, EXTENDED RELEASE ORAL
Qty: 180 TABLET | OUTPATIENT
Start: 2023-05-25

## 2023-05-25 NOTE — TELEPHONE ENCOUNTER
Refill Routing Note   Medication(s) are not appropriate for processing by [facility] for the following reason(s):      Required labs outdated    [facility] action(s):  Defer Appointment due  Labs due          Appointments  past 12m or future 3m with PCP    Date Provider   Last Visit   6/23/2022 Erika Mcclure MD   Next Visit   Visit date not found Erika Mcclure MD   ED visits in past 90 days: 0        Note composed:9:26 AM 05/25/2023

## 2023-05-25 NOTE — TELEPHONE ENCOUNTER
No care due was identified.  [facility] Embedded Care Due Messages. Reference number: [id number].   5/25/2023 12:54:58 PM CDT

## 2023-05-25 NOTE — TELEPHONE ENCOUNTER
Care Due:                  Date            Visit Type   Department     Provider  --------------------------------------------------------------------------------                                EP -                              PRIMARY      [facility] PRIMARY  Last Visit: 06-      CARE ([facility])   ANDREW Mcclure  Next Visit: None Scheduled  None         None Found                                                            Last  Test          Frequency    Reason                     Performed    Due Date  --------------------------------------------------------------------------------    Office Visit  12 months..  metFORMIN................  06- 06-    Cr..........  12 months..  metFORMIN................  06- 06-    HBA1C.......  6 months...  metFORMIN................  06- 12-    [facility] Embedded Care Due Messages. Reference number: [id number].   5/25/2023 4:12:14 AM CDT

## 2023-05-25 NOTE — TELEPHONE ENCOUNTER
Refill Decision Note   Linn Price  is requesting a refill authorization.  Brief Assessment and Rationale for Refill:  Quick Discontinue     Medication Therapy Plan:         Comments:     Note composed:1:37 PM 05/25/2023

## 2023-06-02 ENCOUNTER — PATIENT MESSAGE (OUTPATIENT)
Dept: PRIMARY CARE CLINIC | Facility: CLINIC | Age: 63
End: 2023-06-02
Payer: MEDICARE

## 2023-06-02 DIAGNOSIS — E11.59 HYPERTENSION ASSOCIATED WITH DIABETES: ICD-10-CM

## 2023-06-02 DIAGNOSIS — E78.5 HYPERLIPIDEMIA, UNSPECIFIED HYPERLIPIDEMIA TYPE: ICD-10-CM

## 2023-06-02 DIAGNOSIS — E04.2 MULTINODULAR GOITER: ICD-10-CM

## 2023-06-02 DIAGNOSIS — I15.2 HYPERTENSION ASSOCIATED WITH DIABETES: ICD-10-CM

## 2023-06-02 DIAGNOSIS — Z12.31 ENCOUNTER FOR SCREENING MAMMOGRAM FOR BREAST CANCER: Primary | ICD-10-CM

## 2023-06-02 DIAGNOSIS — D64.9 NORMOCYTIC ANEMIA: ICD-10-CM

## 2023-06-02 DIAGNOSIS — E11.9 CONTROLLED TYPE 2 DIABETES MELLITUS WITHOUT COMPLICATION, WITHOUT LONG-TERM CURRENT USE OF INSULIN: Chronic | ICD-10-CM

## 2023-06-07 ENCOUNTER — APPOINTMENT (OUTPATIENT)
Dept: RADIOLOGY | Facility: OTHER | Age: 63
End: 2023-06-07
Attending: INTERNAL MEDICINE
Payer: MEDICARE

## 2023-06-07 ENCOUNTER — PATIENT OUTREACH (OUTPATIENT)
Dept: ADMINISTRATIVE | Facility: HOSPITAL | Age: 63
End: 2023-06-07
Payer: MEDICARE

## 2023-06-07 VITALS — WEIGHT: 214.75 LBS | BODY MASS INDEX: 35.78 KG/M2 | HEIGHT: 65 IN

## 2023-06-07 DIAGNOSIS — Z12.31 ENCOUNTER FOR SCREENING MAMMOGRAM FOR BREAST CANCER: ICD-10-CM

## 2023-06-07 PROCEDURE — 77067 SCR MAMMO BI INCL CAD: CPT | Mod: TC,PN

## 2023-06-07 PROCEDURE — 77067 SCR MAMMO BI INCL CAD: CPT | Mod: 26,HCNC,, | Performed by: RADIOLOGY

## 2023-06-07 PROCEDURE — 77063 MAMMO DIGITAL SCREENING BILAT WITH TOMO: ICD-10-PCS | Mod: 26,HCNC,, | Performed by: RADIOLOGY

## 2023-06-07 PROCEDURE — 77063 BREAST TOMOSYNTHESIS BI: CPT | Mod: 26,HCNC,, | Performed by: RADIOLOGY

## 2023-06-07 PROCEDURE — 77067 MAMMO DIGITAL SCREENING BILAT WITH TOMO: ICD-10-PCS | Mod: 26,HCNC,, | Performed by: RADIOLOGY

## 2023-06-07 NOTE — PROGRESS NOTES
Health Maintenance Due   Topic Date Due    COVID-19 Vaccine (1) Never done    Pneumococcal Vaccines (Age 65+) (1 - PCV) Never done    HIV Screening  Never done    Shingles Vaccine (1 of 2) Never done    Low Dose Statin  Never done    Foot Exam  09/10/2015    Colorectal Cancer Screening  06/11/2021    Hemoglobin A1c  12/20/2022    Eye Exam  06/07/2023    Diabetes Urine Screening  06/22/2023     Chart reviewed.   Immunizations: Reconciled  Orders placed: N/A  Upcoming appts to satisfy ZENA topics: Mammogram 6/7/2023

## 2023-06-08 ENCOUNTER — PATIENT MESSAGE (OUTPATIENT)
Dept: PRIMARY CARE CLINIC | Facility: CLINIC | Age: 63
End: 2023-06-08
Payer: MEDICARE

## 2023-06-08 NOTE — TELEPHONE ENCOUNTER
She's been noncompliant with her fu. I typically order labs when I see them if not already in but please add urine microalb/cr, CBC, CMP, TSH, FLP to her Ha1c.  Dr. YEAGER

## 2023-06-14 ENCOUNTER — LAB VISIT (OUTPATIENT)
Dept: LAB | Facility: HOSPITAL | Age: 63
End: 2023-06-14
Attending: INTERNAL MEDICINE
Payer: MEDICARE

## 2023-06-14 DIAGNOSIS — E78.5 HYPERLIPIDEMIA, UNSPECIFIED HYPERLIPIDEMIA TYPE: ICD-10-CM

## 2023-06-14 DIAGNOSIS — E04.2 MULTINODULAR GOITER: ICD-10-CM

## 2023-06-14 DIAGNOSIS — E11.9 CONTROLLED TYPE 2 DIABETES MELLITUS WITHOUT COMPLICATION, WITHOUT LONG-TERM CURRENT USE OF INSULIN: ICD-10-CM

## 2023-06-14 DIAGNOSIS — D64.9 NORMOCYTIC ANEMIA: ICD-10-CM

## 2023-06-14 DIAGNOSIS — I15.2 HYPERTENSION ASSOCIATED WITH DIABETES: ICD-10-CM

## 2023-06-14 DIAGNOSIS — E11.59 HYPERTENSION ASSOCIATED WITH DIABETES: ICD-10-CM

## 2023-06-14 LAB
ALBUMIN SERPL BCP-MCNC: 3.7 G/DL (ref 3.5–5.2)
ALP SERPL-CCNC: 91 U/L (ref 55–135)
ALT SERPL W/O P-5'-P-CCNC: 23 U/L (ref 10–44)
ANION GAP SERPL CALC-SCNC: 13 MMOL/L (ref 8–16)
AST SERPL-CCNC: 16 U/L (ref 10–40)
BASOPHILS # BLD AUTO: 0.02 K/UL (ref 0–0.2)
BASOPHILS NFR BLD: 0.3 % (ref 0–1.9)
BILIRUB SERPL-MCNC: 0.5 MG/DL (ref 0.1–1)
BUN SERPL-MCNC: 16 MG/DL (ref 8–23)
CALCIUM SERPL-MCNC: 9.3 MG/DL (ref 8.7–10.5)
CHLORIDE SERPL-SCNC: 100 MMOL/L (ref 95–110)
CHOLEST SERPL-MCNC: 198 MG/DL (ref 120–199)
CHOLEST/HDLC SERPL: 3.3 {RATIO} (ref 2–5)
CO2 SERPL-SCNC: 22 MMOL/L (ref 23–29)
CREAT SERPL-MCNC: 0.9 MG/DL (ref 0.5–1.4)
DIFFERENTIAL METHOD: ABNORMAL
EOSINOPHIL # BLD AUTO: 0.1 K/UL (ref 0–0.5)
EOSINOPHIL NFR BLD: 1.6 % (ref 0–8)
ERYTHROCYTE [DISTWIDTH] IN BLOOD BY AUTOMATED COUNT: 13.2 % (ref 11.5–14.5)
EST. GFR  (NO RACE VARIABLE): >60 ML/MIN/1.73 M^2
ESTIMATED AVG GLUCOSE: 309 MG/DL (ref 68–131)
GLUCOSE SERPL-MCNC: 337 MG/DL (ref 70–110)
HBA1C MFR BLD: 12.4 % (ref 4–5.6)
HCT VFR BLD AUTO: 41.7 % (ref 37–48.5)
HDLC SERPL-MCNC: 60 MG/DL (ref 40–75)
HDLC SERPL: 30.3 % (ref 20–50)
HGB BLD-MCNC: 13.1 G/DL (ref 12–16)
IMM GRANULOCYTES # BLD AUTO: 0.03 K/UL (ref 0–0.04)
IMM GRANULOCYTES NFR BLD AUTO: 0.4 % (ref 0–0.5)
LDLC SERPL CALC-MCNC: 117.8 MG/DL (ref 63–159)
LYMPHOCYTES # BLD AUTO: 2.1 K/UL (ref 1–4.8)
LYMPHOCYTES NFR BLD: 27 % (ref 18–48)
MCH RBC QN AUTO: 27 PG (ref 27–31)
MCHC RBC AUTO-ENTMCNC: 31.4 G/DL (ref 32–36)
MCV RBC AUTO: 86 FL (ref 82–98)
MONOCYTES # BLD AUTO: 0.4 K/UL (ref 0.3–1)
MONOCYTES NFR BLD: 5.3 % (ref 4–15)
NEUTROPHILS # BLD AUTO: 5.2 K/UL (ref 1.8–7.7)
NEUTROPHILS NFR BLD: 65.4 % (ref 38–73)
NONHDLC SERPL-MCNC: 138 MG/DL
NRBC BLD-RTO: 0 /100 WBC
PLATELET # BLD AUTO: 333 K/UL (ref 150–450)
PMV BLD AUTO: 10.6 FL (ref 9.2–12.9)
POTASSIUM SERPL-SCNC: 4.2 MMOL/L (ref 3.5–5.1)
PROT SERPL-MCNC: 7.4 G/DL (ref 6–8.4)
RBC # BLD AUTO: 4.86 M/UL (ref 4–5.4)
SODIUM SERPL-SCNC: 135 MMOL/L (ref 136–145)
TRIGL SERPL-MCNC: 101 MG/DL (ref 30–150)
TSH SERPL DL<=0.005 MIU/L-ACNC: 0.96 UIU/ML (ref 0.4–4)
WBC # BLD AUTO: 7.93 K/UL (ref 3.9–12.7)

## 2023-06-14 PROCEDURE — 36415 COLL VENOUS BLD VENIPUNCTURE: CPT | Performed by: INTERNAL MEDICINE

## 2023-06-14 PROCEDURE — 80053 COMPREHEN METABOLIC PANEL: CPT | Performed by: INTERNAL MEDICINE

## 2023-06-14 PROCEDURE — 83036 HEMOGLOBIN GLYCOSYLATED A1C: CPT | Performed by: INTERNAL MEDICINE

## 2023-06-14 PROCEDURE — 84443 ASSAY THYROID STIM HORMONE: CPT | Performed by: INTERNAL MEDICINE

## 2023-06-14 PROCEDURE — 85025 COMPLETE CBC W/AUTO DIFF WBC: CPT | Performed by: INTERNAL MEDICINE

## 2023-06-14 PROCEDURE — 80061 LIPID PANEL: CPT | Performed by: INTERNAL MEDICINE

## 2023-06-19 ENCOUNTER — OFFICE VISIT (OUTPATIENT)
Dept: INTERNAL MEDICINE | Facility: CLINIC | Age: 63
End: 2023-06-19
Payer: MEDICARE

## 2023-06-19 VITALS
BODY MASS INDEX: 35.06 KG/M2 | RESPIRATION RATE: 14 BRPM | SYSTOLIC BLOOD PRESSURE: 132 MMHG | HEIGHT: 65 IN | WEIGHT: 210.44 LBS | DIASTOLIC BLOOD PRESSURE: 86 MMHG | HEART RATE: 72 BPM

## 2023-06-19 DIAGNOSIS — Z86.010 HISTORY OF COLON POLYPS: ICD-10-CM

## 2023-06-19 DIAGNOSIS — G47.33 OSA ON CPAP: ICD-10-CM

## 2023-06-19 DIAGNOSIS — I15.2 HYPERTENSION ASSOCIATED WITH DIABETES: ICD-10-CM

## 2023-06-19 DIAGNOSIS — E11.65 UNCONTROLLED TYPE 2 DIABETES MELLITUS WITH HYPERGLYCEMIA, WITHOUT LONG-TERM CURRENT USE OF INSULIN: ICD-10-CM

## 2023-06-19 DIAGNOSIS — K21.00 GASTROESOPHAGEAL REFLUX DISEASE WITH ESOPHAGITIS WITHOUT HEMORRHAGE: ICD-10-CM

## 2023-06-19 DIAGNOSIS — Z01.419 WELL WOMAN EXAM WITH ROUTINE GYNECOLOGICAL EXAM: ICD-10-CM

## 2023-06-19 DIAGNOSIS — L40.9 PSORIASIS: ICD-10-CM

## 2023-06-19 DIAGNOSIS — E11.9 DM TYPE 2 WITHOUT RETINOPATHY: ICD-10-CM

## 2023-06-19 DIAGNOSIS — E66.9 DIABETES MELLITUS TYPE 2 IN OBESE: ICD-10-CM

## 2023-06-19 DIAGNOSIS — R91.1 PULMONARY NODULE: ICD-10-CM

## 2023-06-19 DIAGNOSIS — J44.9 CHRONIC OBSTRUCTIVE PULMONARY DISEASE, UNSPECIFIED COPD TYPE: ICD-10-CM

## 2023-06-19 DIAGNOSIS — E04.2 MULTINODULAR GOITER: ICD-10-CM

## 2023-06-19 DIAGNOSIS — E11.69 DIABETES MELLITUS TYPE 2 IN OBESE: ICD-10-CM

## 2023-06-19 DIAGNOSIS — Z00.00 ENCOUNTER FOR MEDICARE ANNUAL WELLNESS EXAM: ICD-10-CM

## 2023-06-19 DIAGNOSIS — E11.59 HYPERTENSION ASSOCIATED WITH DIABETES: ICD-10-CM

## 2023-06-19 DIAGNOSIS — Z11.4 ENCOUNTER FOR SCREENING FOR HIV: ICD-10-CM

## 2023-06-19 DIAGNOSIS — I25.10 ATHEROSCLEROSIS OF NATIVE CORONARY ARTERY OF NATIVE HEART, UNSPECIFIED WHETHER ANGINA PRESENT: ICD-10-CM

## 2023-06-19 DIAGNOSIS — Z87.891 EX-SMOKER: ICD-10-CM

## 2023-06-19 DIAGNOSIS — E78.5 HYPERLIPIDEMIA ASSOCIATED WITH TYPE 2 DIABETES MELLITUS: ICD-10-CM

## 2023-06-19 DIAGNOSIS — E55.9 VITAMIN D DEFICIENCY: ICD-10-CM

## 2023-06-19 DIAGNOSIS — E66.01 CLASS 2 SEVERE OBESITY DUE TO EXCESS CALORIES WITH SERIOUS COMORBIDITY AND BODY MASS INDEX (BMI) OF 35.0 TO 35.9 IN ADULT: ICD-10-CM

## 2023-06-19 DIAGNOSIS — Z12.2 SCREENING FOR LUNG CANCER: ICD-10-CM

## 2023-06-19 DIAGNOSIS — E11.69 HYPERLIPIDEMIA ASSOCIATED WITH TYPE 2 DIABETES MELLITUS: ICD-10-CM

## 2023-06-19 DIAGNOSIS — Z12.83 SKIN CANCER SCREENING: ICD-10-CM

## 2023-06-19 DIAGNOSIS — Z00.00 ENCOUNTER FOR PREVENTIVE HEALTH EXAMINATION: Primary | ICD-10-CM

## 2023-06-19 PROBLEM — Z12.11 COLON CANCER SCREENING: Status: RESOLVED | Noted: 2021-07-15 | Resolved: 2023-06-19

## 2023-06-19 PROBLEM — H60.501 ACUTE OTITIS EXTERNA OF RIGHT EAR: Status: RESOLVED | Noted: 2020-12-01 | Resolved: 2023-06-19

## 2023-06-19 PROCEDURE — 99999 PR PBB SHADOW E&M-EST. PATIENT-LVL V: ICD-10-PCS | Mod: PBBFAC,,, | Performed by: NURSE PRACTITIONER

## 2023-06-19 PROCEDURE — 99999 PR PBB SHADOW E&M-EST. PATIENT-LVL V: CPT | Mod: PBBFAC,,, | Performed by: NURSE PRACTITIONER

## 2023-06-19 PROCEDURE — 3288F FALL RISK ASSESSMENT DOCD: CPT | Mod: CPTII,S$GLB,, | Performed by: NURSE PRACTITIONER

## 2023-06-19 PROCEDURE — 3061F NEG MICROALBUMINURIA REV: CPT | Mod: CPTII,S$GLB,, | Performed by: NURSE PRACTITIONER

## 2023-06-19 PROCEDURE — 3061F PR NEG MICROALBUMINURIA RESULT DOCUMENTED/REVIEW: ICD-10-PCS | Mod: CPTII,S$GLB,, | Performed by: NURSE PRACTITIONER

## 2023-06-19 PROCEDURE — 1101F PR PT FALLS ASSESS DOC 0-1 FALLS W/OUT INJ PAST YR: ICD-10-PCS | Mod: CPTII,S$GLB,, | Performed by: NURSE PRACTITIONER

## 2023-06-19 PROCEDURE — 3008F BODY MASS INDEX DOCD: CPT | Mod: CPTII,S$GLB,, | Performed by: NURSE PRACTITIONER

## 2023-06-19 PROCEDURE — 3075F PR MOST RECENT SYSTOLIC BLOOD PRESS GE 130-139MM HG: ICD-10-PCS | Mod: CPTII,S$GLB,, | Performed by: NURSE PRACTITIONER

## 2023-06-19 PROCEDURE — 3075F SYST BP GE 130 - 139MM HG: CPT | Mod: CPTII,S$GLB,, | Performed by: NURSE PRACTITIONER

## 2023-06-19 PROCEDURE — 3046F HEMOGLOBIN A1C LEVEL >9.0%: CPT | Mod: CPTII,S$GLB,, | Performed by: NURSE PRACTITIONER

## 2023-06-19 PROCEDURE — G0402 INITIAL PREVENTIVE EXAM: HCPCS | Mod: S$GLB,,, | Performed by: NURSE PRACTITIONER

## 2023-06-19 PROCEDURE — 3079F PR MOST RECENT DIASTOLIC BLOOD PRESSURE 80-89 MM HG: ICD-10-PCS | Mod: CPTII,S$GLB,, | Performed by: NURSE PRACTITIONER

## 2023-06-19 PROCEDURE — 99499 RISK ADDL DX/OHS AUDIT: ICD-10-PCS | Mod: HCNC,S$GLB,, | Performed by: NURSE PRACTITIONER

## 2023-06-19 PROCEDURE — G9919 SCRN ND POS ND PROV OF REC: HCPCS | Mod: CPTII,S$GLB,, | Performed by: NURSE PRACTITIONER

## 2023-06-19 PROCEDURE — G0402 PR WELCOME MEDICARE PREVENTIVE VISIT NEW ENROLLEE: ICD-10-PCS | Mod: S$GLB,,, | Performed by: NURSE PRACTITIONER

## 2023-06-19 PROCEDURE — G9919 PR SCREENING AND POSITIVE: ICD-10-PCS | Mod: CPTII,S$GLB,, | Performed by: NURSE PRACTITIONER

## 2023-06-19 PROCEDURE — 3066F NEPHROPATHY DOC TX: CPT | Mod: CPTII,S$GLB,, | Performed by: NURSE PRACTITIONER

## 2023-06-19 PROCEDURE — 4010F ACE/ARB THERAPY RXD/TAKEN: CPT | Mod: CPTII,S$GLB,, | Performed by: NURSE PRACTITIONER

## 2023-06-19 PROCEDURE — 3066F PR DOCUMENTATION OF TREATMENT FOR NEPHROPATHY: ICD-10-PCS | Mod: CPTII,S$GLB,, | Performed by: NURSE PRACTITIONER

## 2023-06-19 PROCEDURE — 3079F DIAST BP 80-89 MM HG: CPT | Mod: CPTII,S$GLB,, | Performed by: NURSE PRACTITIONER

## 2023-06-19 PROCEDURE — 1101F PT FALLS ASSESS-DOCD LE1/YR: CPT | Mod: CPTII,S$GLB,, | Performed by: NURSE PRACTITIONER

## 2023-06-19 PROCEDURE — 4010F PR ACE/ARB THEARPY RXD/TAKEN: ICD-10-PCS | Mod: CPTII,S$GLB,, | Performed by: NURSE PRACTITIONER

## 2023-06-19 PROCEDURE — 99499 UNLISTED E&M SERVICE: CPT | Mod: HCNC,S$GLB,, | Performed by: NURSE PRACTITIONER

## 2023-06-19 PROCEDURE — 3046F PR MOST RECENT HEMOGLOBIN A1C LEVEL > 9.0%: ICD-10-PCS | Mod: CPTII,S$GLB,, | Performed by: NURSE PRACTITIONER

## 2023-06-19 PROCEDURE — 3288F PR FALLS RISK ASSESSMENT DOCUMENTED: ICD-10-PCS | Mod: CPTII,S$GLB,, | Performed by: NURSE PRACTITIONER

## 2023-06-19 PROCEDURE — 3008F PR BODY MASS INDEX (BMI) DOCUMENTED: ICD-10-PCS | Mod: CPTII,S$GLB,, | Performed by: NURSE PRACTITIONER

## 2023-06-19 RX ORDER — SULFACETAMIDE SODIUM 100 MG/ML
1 SOLUTION/ DROPS OPHTHALMIC
COMMUNITY

## 2023-06-19 RX ORDER — TRIAMCINOLONE ACETONIDE 1 MG/G
CREAM TOPICAL
COMMUNITY

## 2023-06-19 NOTE — PATIENT INSTRUCTIONS
Counseling and Referral of Other Preventative  (Italic type indicates deductible and co-insurance are waived)    Patient Name: Linn Price  Today's Date: 6/19/2023    Health Maintenance         Date Due Completion Date    COVID-19 Vaccine (1) Declined   ---    Pneumococcal Vaccines (Age 65+) (1 - PCV) Declined   ---    HIV Screening Ordered   ---    Shingles Vaccine (1 of 2) Declined   ---    High Dose Statin Per PCP   ---    Foot Exam Followed by external podiatry last May 2023 per pt   9/10/2014 (Done)        Colorectal Cancer Screening Due- referral to dr slaughter   6/11/2018        LDCT Lung Screen Ordered   11/29/2021    Eye Exam Done by Dr Tang-  kamala in June 6/7/2022    Mammogram  Done- pending results- follow up with PCp   12/1/2020        DEXA Scan 07/23/2023 7/23/2021    Influenza Vaccine (Season Ended) 09/01/2023 ---    Hemoglobin A1c 09/14/2023 6/14/2023        Diabetes Urine Screening 06/14/2024 6/14/2023    Lipid Panel 06/14/2024 6/14/2023        TETANUS VACCINE 10/28/2025 10/28/2015 (Done)        Referral to cardiology- risk factor stratification, HTN, hyperlipidemia    Referral to endocrinology- DM uncontrolled    Referral to GYN                No orders of the defined types were placed in this encounter.    The following information is provided to all patients.  This information is to help you find resources for any of the problems found today that may be affecting your health:                Living healthy guide: www.[facility].[place].gov      Understanding Diabetes: www.diabetes.org      Eating healthy: www.cdc.gov/healthyweight      CDC home safety checklist: www.cdc.gov/steadi/patient.html      Agency on Aging: www.goea.[place].[facility]      Alcoholics anonymous (AA): www.aa.org      Physical Activity: www.heather.nih.gov/jw1yzgs      Tobacco use: www.quitwithusla.org

## 2023-06-19 NOTE — PROGRESS NOTES
"Linn Price presented for a  Medicare AWV and comprehensive Health Risk Assessment today. The following components were reviewed and updated:    Medical history  Family History  Social history  Allergies and Current Medications  Health Risk Assessment  Health Maintenance  Care Team     ** See Completed Assessments for Annual Wellness Visit within the encounter summary.**       The following assessments were completed:  Living Situation  CAGE  Depression Screening  Timed Get Up and Go  Whisper Test-abnormal  Cognitive Function Screening  Nutrition Screening  ADL Screening  PAQ Screening      Vitals:    06/19/23 1039   BP: 132/86   BP Location: Left arm   Patient Position: Sitting   Pulse: 72   Resp: 14   Weight: 95.5 kg (210 lb 6.9 oz)   Height: 5' 5" (1.651 m)     Body mass index is 35.02 kg/m².  Physical Exam  Constitutional:       Comments: Younger in appearance than age   HENT:      Right Ear: There is no impacted cerumen.      Left Ear: There is no impacted cerumen.   Eyes:      General: No scleral icterus.  Cardiovascular:      Rate and Rhythm: Normal rate and regular rhythm.   Pulmonary:      Effort: Pulmonary effort is normal.      Breath sounds: Normal breath sounds.   Abdominal:      Palpations: Abdomen is soft.   Musculoskeletal:         General: No swelling. Normal range of motion.   Skin:     General: Skin is warm and dry.   Neurological:      Mental Status: She is alert and oriented to person, place, and time.   Psychiatric:         Mood and Affect: Mood normal.         Behavior: Behavior normal.         Thought Content: Thought content normal.         Judgment: Judgment normal.          Medication review & Opioid screening perform during rooming section. No prescribed opioid medications noted.  Review for substance use disorder screening performed during rooming section. No substance abuse noted on screening.      Diagnoses and health risks identified today and associated " recommendations/orders:    1. Atherosclerosis of native coronary artery of native heart, unspecified whether angina present  Stable followed by PCP  - Ambulatory referral/consult to Cardiology; Future    2. DM type 2 without retinopathy  Stable followed by ophth    3. Chronic obstructive pulmonary disease, unspecified COPD type  Stable followed by PCP    4. Multinodular goiter  Stable followed by PCP    5. Pulmonary nodule  Stable followed by PCP    6. Gastroesophageal reflux disease with esophagitis without hemorrhage  Stable followed by PCP    7. JANAE on CPAP  Stable followed by sleep med    8. Hypertension associated with diabetes  Stable followed by PCP  - Ambulatory referral/consult to Cardiology; Future    9. Psoriasis  Stable followed by derm  - Ambulatory referral/consult to Dermatology; Future    10. Vitamin D deficiency  Stable followed by PCP    11. Hyperlipidemia associated with type 2 diabetes mellitus  Stable followed by PCP  - Ambulatory referral/consult to Cardiology; Future    12. Class 2 severe obesity due to excess calories with serious comorbidity and body mass index (BMI) of 35.0 to 35.9 in adult  Chronic. Followed by PCP.   Centers for Disease Control and Prevention (CDC)  weight recommendations for current BMI & ideal BMI range discussed with patient.  Recommended  gradual weight loss,  mediterranean diet diabetic diet , no added salt diet structured regular exercise  every day.       13. Encounter for Medicare annual wellness exam  Here for Health Risk Assessment/Annual Wellness Visit.  Health maintenance reviewed and updated. Follow up in one year.    - Ambulatory Referral/Consult to Enhanced Annual Wellness Visit (eAWV)    14. Encounter for preventive health examination  Here for Health Risk Assessment/Annual Wellness Visit.  Health maintenance reviewed and updated. Follow up in one year.        15. Diabetes mellitus type 2 in obese  Stable followed by PCP  - Ambulatory referral/consult to  Endocrinology; Future    16. Uncontrolled type 2 diabetes mellitus with hyperglycemia, without long-term current use of insulin  Stable followed by PCP  - Ambulatory referral/consult to Endocrinology; Future    17. Well woman exam with routine gynecological exam  - Ambulatory referral/consult to Gynecology; Future    18. Skin cancer screening  - Ambulatory referral/consult to Dermatology; Future    19. Ex-smoker  - CT Chest Lung Screening Low Dose; Future    20. Screening for lung cancer  - CT Chest Lung Screening Low Dose; Future    21. History of colon polyps  Ambulatory referral/consult to Gastroenterology; Future    22. Encounter for screening for HIV  - HIV 1/2 Ag/Ab (4th Gen); Future      Provided Linn with a 5-10 year written screening schedule and personal prevention plan. Recommendations were developed using the USPSTF age appropriate recommendations. Education, counseling, and referrals were provided as needed. After Visit Summary printed and given to patient which includes a list of additional screenings\tests needed. Follow up in about 1 year (around 6/19/2024) for HRA.Refer to health maintenance chart on after visit summary.Problem list reviewed & updated w patient. Home fast bld sugar 170 range. DM goal B/P 120/80- elevated today- she will recheck. Referral to cardiology- risk factor stratification, HTN, hyperlipidemia.Referral to endocrinology- DM uncontrolled.Referral to GYN. Current on eye- Wool external eye exam. Atherosclerotic Cardiovascular  Disease 2013 Risk calculator from AHA/ACC for estimation on risk for a  cardiovascular  event ( coronary or stroke death or  nonfatal MI or stroke) in the next 10 years is as followed:The 10-year ASCVD risk score (Zack MICHEL Jr., et al., 2013) is: 10.7%- decreased to 4.0% with risk factor modification: diet, exercise, DM management & control bld sugar,lipid management, B/P goal < or equal to 120/80.    Values used to calculate the score:      Age:     63  years      Sex: Female      Is Non- : yes      Diabetic: yes      Tobacco smoker: No      Systolic Blood Pressure: 132 mmHg      Is BP treated: No      HDL Cholesterol: 60 mg/dL      Total Cholesterol: 198 mg/dL   Pt admits to dietary discretions- has F/U appt w PCP this week. Problem list updated with pt.          Tatiana Walker NP I offered to discuss advanced care planning, including how to pick a person who would make decisions for you if you were unable to make them for yourself, called a health care power of , and what kind of decisions you might make such as use of life sustaining treatments such as ventilators and tube feeding when faced with a life limiting illness recorded on a living will that they will need to know. (How you want to be cared for as you near the end of your natural life)     X Patient is interested in learning more about how to make advanced directives.  I provided them paperwork and offered to discuss this with them.

## 2023-06-21 ENCOUNTER — OFFICE VISIT (OUTPATIENT)
Dept: CARDIOLOGY | Facility: CLINIC | Age: 63
End: 2023-06-21
Payer: MEDICARE

## 2023-06-21 VITALS
WEIGHT: 211.44 LBS | BODY MASS INDEX: 35.23 KG/M2 | SYSTOLIC BLOOD PRESSURE: 173 MMHG | HEIGHT: 65 IN | HEART RATE: 76 BPM | DIASTOLIC BLOOD PRESSURE: 89 MMHG

## 2023-06-21 DIAGNOSIS — I15.2 HYPERTENSION ASSOCIATED WITH DIABETES: ICD-10-CM

## 2023-06-21 DIAGNOSIS — E78.5 HYPERLIPIDEMIA ASSOCIATED WITH TYPE 2 DIABETES MELLITUS: ICD-10-CM

## 2023-06-21 DIAGNOSIS — E11.59 HYPERTENSION ASSOCIATED WITH DIABETES: ICD-10-CM

## 2023-06-21 DIAGNOSIS — R06.02 SOB (SHORTNESS OF BREATH): Primary | ICD-10-CM

## 2023-06-21 DIAGNOSIS — I25.10 ATHEROSCLEROSIS OF NATIVE CORONARY ARTERY OF NATIVE HEART, UNSPECIFIED WHETHER ANGINA PRESENT: ICD-10-CM

## 2023-06-21 DIAGNOSIS — E11.69 HYPERLIPIDEMIA ASSOCIATED WITH TYPE 2 DIABETES MELLITUS: ICD-10-CM

## 2023-06-21 PROCEDURE — 1101F PT FALLS ASSESS-DOCD LE1/YR: CPT | Mod: CPTII,S$GLB,, | Performed by: INTERNAL MEDICINE

## 2023-06-21 PROCEDURE — 3061F PR NEG MICROALBUMINURIA RESULT DOCUMENTED/REVIEW: ICD-10-PCS | Mod: CPTII,S$GLB,, | Performed by: INTERNAL MEDICINE

## 2023-06-21 PROCEDURE — 3008F BODY MASS INDEX DOCD: CPT | Mod: CPTII,S$GLB,, | Performed by: INTERNAL MEDICINE

## 2023-06-21 PROCEDURE — 3066F NEPHROPATHY DOC TX: CPT | Mod: CPTII,S$GLB,, | Performed by: INTERNAL MEDICINE

## 2023-06-21 PROCEDURE — 1160F RVW MEDS BY RX/DR IN RCRD: CPT | Mod: CPTII,S$GLB,, | Performed by: INTERNAL MEDICINE

## 2023-06-21 PROCEDURE — 4010F ACE/ARB THERAPY RXD/TAKEN: CPT | Mod: CPTII,S$GLB,, | Performed by: INTERNAL MEDICINE

## 2023-06-21 PROCEDURE — 3066F PR DOCUMENTATION OF TREATMENT FOR NEPHROPATHY: ICD-10-PCS | Mod: CPTII,S$GLB,, | Performed by: INTERNAL MEDICINE

## 2023-06-21 PROCEDURE — 1159F PR MEDICATION LIST DOCUMENTED IN MEDICAL RECORD: ICD-10-PCS | Mod: CPTII,S$GLB,, | Performed by: INTERNAL MEDICINE

## 2023-06-21 PROCEDURE — 99204 OFFICE O/P NEW MOD 45 MIN: CPT | Mod: 25,S$GLB,, | Performed by: INTERNAL MEDICINE

## 2023-06-21 PROCEDURE — 3079F PR MOST RECENT DIASTOLIC BLOOD PRESSURE 80-89 MM HG: ICD-10-PCS | Mod: CPTII,S$GLB,, | Performed by: INTERNAL MEDICINE

## 2023-06-21 PROCEDURE — 3079F DIAST BP 80-89 MM HG: CPT | Mod: CPTII,S$GLB,, | Performed by: INTERNAL MEDICINE

## 2023-06-21 PROCEDURE — 1159F MED LIST DOCD IN RCRD: CPT | Mod: CPTII,S$GLB,, | Performed by: INTERNAL MEDICINE

## 2023-06-21 PROCEDURE — 3288F FALL RISK ASSESSMENT DOCD: CPT | Mod: CPTII,S$GLB,, | Performed by: INTERNAL MEDICINE

## 2023-06-21 PROCEDURE — 99999 PR PBB SHADOW E&M-EST. PATIENT-LVL V: CPT | Mod: PBBFAC,,, | Performed by: INTERNAL MEDICINE

## 2023-06-21 PROCEDURE — 3077F SYST BP >= 140 MM HG: CPT | Mod: CPTII,S$GLB,, | Performed by: INTERNAL MEDICINE

## 2023-06-21 PROCEDURE — 93000 EKG 12-LEAD: ICD-10-PCS | Mod: S$GLB,,, | Performed by: INTERNAL MEDICINE

## 2023-06-21 PROCEDURE — 1160F PR REVIEW ALL MEDS BY PRESCRIBER/CLIN PHARMACIST DOCUMENTED: ICD-10-PCS | Mod: CPTII,S$GLB,, | Performed by: INTERNAL MEDICINE

## 2023-06-21 PROCEDURE — 3077F PR MOST RECENT SYSTOLIC BLOOD PRESSURE >= 140 MM HG: ICD-10-PCS | Mod: CPTII,S$GLB,, | Performed by: INTERNAL MEDICINE

## 2023-06-21 PROCEDURE — 93000 ELECTROCARDIOGRAM COMPLETE: CPT | Mod: S$GLB,,, | Performed by: INTERNAL MEDICINE

## 2023-06-21 PROCEDURE — 1101F PR PT FALLS ASSESS DOC 0-1 FALLS W/OUT INJ PAST YR: ICD-10-PCS | Mod: CPTII,S$GLB,, | Performed by: INTERNAL MEDICINE

## 2023-06-21 PROCEDURE — 3008F PR BODY MASS INDEX (BMI) DOCUMENTED: ICD-10-PCS | Mod: CPTII,S$GLB,, | Performed by: INTERNAL MEDICINE

## 2023-06-21 PROCEDURE — 3046F HEMOGLOBIN A1C LEVEL >9.0%: CPT | Mod: CPTII,S$GLB,, | Performed by: INTERNAL MEDICINE

## 2023-06-21 PROCEDURE — 99204 PR OFFICE/OUTPT VISIT, NEW, LEVL IV, 45-59 MIN: ICD-10-PCS | Mod: 25,S$GLB,, | Performed by: INTERNAL MEDICINE

## 2023-06-21 PROCEDURE — 3046F PR MOST RECENT HEMOGLOBIN A1C LEVEL > 9.0%: ICD-10-PCS | Mod: CPTII,S$GLB,, | Performed by: INTERNAL MEDICINE

## 2023-06-21 PROCEDURE — 3061F NEG MICROALBUMINURIA REV: CPT | Mod: CPTII,S$GLB,, | Performed by: INTERNAL MEDICINE

## 2023-06-21 PROCEDURE — 4010F PR ACE/ARB THEARPY RXD/TAKEN: ICD-10-PCS | Mod: CPTII,S$GLB,, | Performed by: INTERNAL MEDICINE

## 2023-06-21 PROCEDURE — 3288F PR FALLS RISK ASSESSMENT DOCUMENTED: ICD-10-PCS | Mod: CPTII,S$GLB,, | Performed by: INTERNAL MEDICINE

## 2023-06-21 PROCEDURE — 99999 PR PBB SHADOW E&M-EST. PATIENT-LVL V: ICD-10-PCS | Mod: PBBFAC,,, | Performed by: INTERNAL MEDICINE

## 2023-06-21 RX ORDER — LISINOPRIL 10 MG/1
10 TABLET ORAL DAILY
Qty: 90 TABLET | Refills: 3 | Status: SHIPPED | OUTPATIENT
Start: 2023-06-21 | End: 2023-10-10

## 2023-06-21 RX ORDER — ROSUVASTATIN CALCIUM 5 MG/1
5 TABLET, COATED ORAL DAILY
Qty: 90 TABLET | Refills: 3 | Status: SHIPPED | OUTPATIENT
Start: 2023-06-21

## 2023-06-21 NOTE — PROGRESS NOTES
HISTORY:    63-year-old female with a history of CAD on CT chest, hypertension, hyperlipidemia, prev tob, and diabetes presenting for initial evaluation by me.    The patient denies any symptoms of chest pain. Patient does note some LOERA and fatigue.     Remains active in her life. 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 Walks a lot for work.    Hasn't been taking any meds recently and A1c has increased significantly. Doesn't watch her diet.    The patient denies any previous history of myocardial infarction, peripheral arterial disease, stroke, congestive heart failure, or cardiomyopathy.      PHYSICAL EXAM:    Vitals:    06/21/23 0824   BP: (!) 173/89   Pulse: 76     NAD, A+Ox3.  No jvd, no bruit.  RRR nml s1,s2. No murmurs.  CTA B no wheezes or crackles.  No edema.    LABS/STUDIES (imaging reviewed during clinic visit):    June 2023 CBC and CMP unremarkable with the exception of an elevated blood glucose.  A1c 12.4.   and HDL 60.  Triglycerides 101.  TSH normal.    ECG today demonstrates NSR no Qs/Sts.   CT chest 2021 normal heart size.  Aortic and coronary atherosclerosis.      ASSESSMENT & PLAN:    1. SOB (shortness of breath)    2. Atherosclerosis of native coronary artery of native heart, unspecified whether angina present    3. Hypertension associated with diabetes    4. Hyperlipidemia associated with type 2 diabetes mellitus        Orders Placed This Encounter    Basic Metabolic Panel    Exercise Stress - EKG    IN OFFICE EKG 12-LEAD (to Muse)    Echo    lisinopriL 10 MG tablet    rosuvastatin (CRESTOR) 5 MG tablet        Pt with CAD evidenced by CT scan. No clinical hx. Does have some LOERA. Can proceed with TST for further assessment.    Will focus on RF modification as well. Pt understands the importance of DM management and is ready to recommit. Her A1c is above 12.    Bps elevated. Will start lisinopril 10x1. Start a home BP log.    . Start rosuvastatin 5x1.      Follow up  in about 3 months (around 9/21/2023).      Myra Holloway MD

## 2023-06-22 ENCOUNTER — PATIENT MESSAGE (OUTPATIENT)
Dept: ADMINISTRATIVE | Facility: OTHER | Age: 63
End: 2023-06-22
Payer: MEDICARE

## 2023-06-22 ENCOUNTER — OFFICE VISIT (OUTPATIENT)
Dept: PRIMARY CARE CLINIC | Facility: CLINIC | Age: 63
End: 2023-06-22
Payer: MEDICARE

## 2023-06-22 VITALS
BODY MASS INDEX: 35.16 KG/M2 | HEART RATE: 72 BPM | OXYGEN SATURATION: 97 % | SYSTOLIC BLOOD PRESSURE: 170 MMHG | TEMPERATURE: 98 F | DIASTOLIC BLOOD PRESSURE: 90 MMHG | WEIGHT: 211 LBS | RESPIRATION RATE: 18 BRPM | HEIGHT: 65 IN

## 2023-06-22 DIAGNOSIS — I15.2 HYPERTENSION ASSOCIATED WITH DIABETES: ICD-10-CM

## 2023-06-22 DIAGNOSIS — E11.59 HYPERTENSION ASSOCIATED WITH DIABETES: ICD-10-CM

## 2023-06-22 DIAGNOSIS — E66.01 CLASS 2 SEVERE OBESITY DUE TO EXCESS CALORIES WITH SERIOUS COMORBIDITY AND BODY MASS INDEX (BMI) OF 35.0 TO 35.9 IN ADULT: ICD-10-CM

## 2023-06-22 DIAGNOSIS — E11.9 TYPE 2 DIABETES MELLITUS WITHOUT COMPLICATION, WITHOUT LONG-TERM CURRENT USE OF INSULIN: ICD-10-CM

## 2023-06-22 DIAGNOSIS — R29.898 DECREASED ROM OF NECK: ICD-10-CM

## 2023-06-22 DIAGNOSIS — G89.29 CHRONIC NECK PAIN: ICD-10-CM

## 2023-06-22 DIAGNOSIS — R91.8 MULTIPLE PULMONARY NODULES: ICD-10-CM

## 2023-06-22 DIAGNOSIS — E78.5 HYPERLIPIDEMIA, UNSPECIFIED HYPERLIPIDEMIA TYPE: ICD-10-CM

## 2023-06-22 DIAGNOSIS — J44.9 CHRONIC OBSTRUCTIVE PULMONARY DISEASE, UNSPECIFIED COPD TYPE: ICD-10-CM

## 2023-06-22 DIAGNOSIS — M54.2 CHRONIC NECK PAIN: ICD-10-CM

## 2023-06-22 DIAGNOSIS — G47.33 OSA ON CPAP: ICD-10-CM

## 2023-06-22 DIAGNOSIS — E11.65 TYPE 2 DIABETES MELLITUS WITH HYPERGLYCEMIA, WITHOUT LONG-TERM CURRENT USE OF INSULIN: Primary | ICD-10-CM

## 2023-06-22 PROCEDURE — 99999 PR PBB SHADOW E&M-EST. PATIENT-LVL V: CPT | Mod: PBBFAC,HCNC,, | Performed by: INTERNAL MEDICINE

## 2023-06-22 PROCEDURE — 3008F PR BODY MASS INDEX (BMI) DOCUMENTED: ICD-10-PCS | Mod: HCNC,CPTII,S$GLB, | Performed by: INTERNAL MEDICINE

## 2023-06-22 PROCEDURE — 99499 RISK ADDL DX/OHS AUDIT: ICD-10-PCS | Mod: HCNC,S$GLB,, | Performed by: INTERNAL MEDICINE

## 2023-06-22 PROCEDURE — 3061F NEG MICROALBUMINURIA REV: CPT | Mod: HCNC,CPTII,S$GLB, | Performed by: INTERNAL MEDICINE

## 2023-06-22 PROCEDURE — 99499 UNLISTED E&M SERVICE: CPT | Mod: HCNC,S$GLB,, | Performed by: INTERNAL MEDICINE

## 2023-06-22 PROCEDURE — 99214 PR OFFICE/OUTPT VISIT, EST, LEVL IV, 30-39 MIN: ICD-10-PCS | Mod: HCNC,S$GLB,, | Performed by: INTERNAL MEDICINE

## 2023-06-22 PROCEDURE — 3288F FALL RISK ASSESSMENT DOCD: CPT | Mod: HCNC,CPTII,S$GLB, | Performed by: INTERNAL MEDICINE

## 2023-06-22 PROCEDURE — 1159F MED LIST DOCD IN RCRD: CPT | Mod: HCNC,CPTII,S$GLB, | Performed by: INTERNAL MEDICINE

## 2023-06-22 PROCEDURE — 99999 PR PBB SHADOW E&M-EST. PATIENT-LVL V: ICD-10-PCS | Mod: PBBFAC,HCNC,, | Performed by: INTERNAL MEDICINE

## 2023-06-22 PROCEDURE — 3077F SYST BP >= 140 MM HG: CPT | Mod: HCNC,CPTII,S$GLB, | Performed by: INTERNAL MEDICINE

## 2023-06-22 PROCEDURE — 3061F PR NEG MICROALBUMINURIA RESULT DOCUMENTED/REVIEW: ICD-10-PCS | Mod: HCNC,CPTII,S$GLB, | Performed by: INTERNAL MEDICINE

## 2023-06-22 PROCEDURE — 1101F PT FALLS ASSESS-DOCD LE1/YR: CPT | Mod: HCNC,CPTII,S$GLB, | Performed by: INTERNAL MEDICINE

## 2023-06-22 PROCEDURE — 3066F PR DOCUMENTATION OF TREATMENT FOR NEPHROPATHY: ICD-10-PCS | Mod: HCNC,CPTII,S$GLB, | Performed by: INTERNAL MEDICINE

## 2023-06-22 PROCEDURE — 3046F HEMOGLOBIN A1C LEVEL >9.0%: CPT | Mod: HCNC,CPTII,S$GLB, | Performed by: INTERNAL MEDICINE

## 2023-06-22 PROCEDURE — 1160F RVW MEDS BY RX/DR IN RCRD: CPT | Mod: HCNC,CPTII,S$GLB, | Performed by: INTERNAL MEDICINE

## 2023-06-22 PROCEDURE — 4010F PR ACE/ARB THEARPY RXD/TAKEN: ICD-10-PCS | Mod: HCNC,CPTII,S$GLB, | Performed by: INTERNAL MEDICINE

## 2023-06-22 PROCEDURE — 1101F PR PT FALLS ASSESS DOC 0-1 FALLS W/OUT INJ PAST YR: ICD-10-PCS | Mod: HCNC,CPTII,S$GLB, | Performed by: INTERNAL MEDICINE

## 2023-06-22 PROCEDURE — 3288F PR FALLS RISK ASSESSMENT DOCUMENTED: ICD-10-PCS | Mod: HCNC,CPTII,S$GLB, | Performed by: INTERNAL MEDICINE

## 2023-06-22 PROCEDURE — 99214 OFFICE O/P EST MOD 30 MIN: CPT | Mod: HCNC,S$GLB,, | Performed by: INTERNAL MEDICINE

## 2023-06-22 PROCEDURE — 3080F DIAST BP >= 90 MM HG: CPT | Mod: HCNC,CPTII,S$GLB, | Performed by: INTERNAL MEDICINE

## 2023-06-22 PROCEDURE — 3077F PR MOST RECENT SYSTOLIC BLOOD PRESSURE >= 140 MM HG: ICD-10-PCS | Mod: HCNC,CPTII,S$GLB, | Performed by: INTERNAL MEDICINE

## 2023-06-22 PROCEDURE — 1159F PR MEDICATION LIST DOCUMENTED IN MEDICAL RECORD: ICD-10-PCS | Mod: HCNC,CPTII,S$GLB, | Performed by: INTERNAL MEDICINE

## 2023-06-22 PROCEDURE — 3008F BODY MASS INDEX DOCD: CPT | Mod: HCNC,CPTII,S$GLB, | Performed by: INTERNAL MEDICINE

## 2023-06-22 PROCEDURE — 4010F ACE/ARB THERAPY RXD/TAKEN: CPT | Mod: HCNC,CPTII,S$GLB, | Performed by: INTERNAL MEDICINE

## 2023-06-22 PROCEDURE — 3080F PR MOST RECENT DIASTOLIC BLOOD PRESSURE >= 90 MM HG: ICD-10-PCS | Mod: HCNC,CPTII,S$GLB, | Performed by: INTERNAL MEDICINE

## 2023-06-22 PROCEDURE — 3046F PR MOST RECENT HEMOGLOBIN A1C LEVEL > 9.0%: ICD-10-PCS | Mod: HCNC,CPTII,S$GLB, | Performed by: INTERNAL MEDICINE

## 2023-06-22 PROCEDURE — 1160F PR REVIEW ALL MEDS BY PRESCRIBER/CLIN PHARMACIST DOCUMENTED: ICD-10-PCS | Mod: HCNC,CPTII,S$GLB, | Performed by: INTERNAL MEDICINE

## 2023-06-22 PROCEDURE — 3066F NEPHROPATHY DOC TX: CPT | Mod: HCNC,CPTII,S$GLB, | Performed by: INTERNAL MEDICINE

## 2023-06-22 RX ORDER — SEMAGLUTIDE 0.68 MG/ML
INJECTION, SOLUTION SUBCUTANEOUS
Qty: 3 ML | Refills: 0 | Status: SHIPPED | OUTPATIENT
Start: 2023-06-22 | End: 2023-07-20 | Stop reason: SDUPTHER

## 2023-06-22 RX ORDER — METFORMIN HYDROCHLORIDE 500 MG/1
TABLET, EXTENDED RELEASE ORAL
Qty: 180 TABLET | Refills: 1 | Status: SHIPPED | OUTPATIENT
Start: 2023-06-22 | End: 2023-12-16

## 2023-06-22 RX ORDER — EPINEPHRINE 0.22MG
AEROSOL WITH ADAPTER (ML) INHALATION
Qty: 1 CAPSULE | Refills: 0
Start: 2023-06-22

## 2023-06-22 NOTE — TELEPHONE ENCOUNTER
Refill Decision Note   Linn Price  is requesting a refill authorization.  Brief Assessment and Rationale for Refill:  Approve     Medication Therapy Plan:  cont current medications 6/22/23      Comments:     Note composed:12:41 PM 06/22/2023             Appointments     Last Visit   6/23/2022 Erika Mcclure MD   Next Visit   6/22/2023 Erika Mcclure MD

## 2023-06-22 NOTE — PROGRESS NOTES
I sent pt a my chart message -  I reviewed your labs.  Your kidney and liver functions looked good. Your sodium was borderline low at 135.  Your Diabetes was extremely uncontrolled at 12.4. You are not anemic.  Your Thyroid function was normal. Your cholesterol was slightly high as your LDL (bad cholesterol) goal is <70.  We will discuss at your visit.

## 2023-06-22 NOTE — PROGRESS NOTES
"[facility] Note    Chief Complaint      Chief Complaint   Patient presents with    Follow-up       History of Present Illness      Linn Price is a 63 y.o.   WF with HTN, DM - II, JANAE on CPAP, Chronic Constipation, GERD, Colon polyps,Pulm nodule, Noncompliance, and Nicotine Dependence in Remission presents to fu well visit and chronic issues. Last virtual visit - 6/23/22    Hyponatremia - sodium was borderline low at 135.    Diabetes was extremely uncontrolled at 12.4.   Your cholesterol was slightly high as your LDL (bad cholesterol) goal is <70.  We will discuss at your visit.  no evidence of microalbuminuria .       Noncompliance - Pt overdue for labs and fu.  Per digital emd program note - 12/7/22 "Not taking HTN medicines." Not measuring BP, reports that she has been too busy". Per dcards note, 6/21/23 - "Hasn't been taking any meds recently ".     Pulm nodules - CT Chest -11/29/21 - Stable 7 mm right middle lobe pulmonary nodule. Normal heart size.  Trace pericardial fluid which is not a concern and nothing she needs to do anything about. Return to yearly screening low-dose lung cancer screening CT. Will repeat tomorrow.      Thyroid nodule - Thyroid U/S - 5/25/21- small (9mm) nodule that does not require further intervention of follow up.     COPD - Pt with prev PFT's at  - + Obstruction. Cont prn albuterol.  +SOB with house work.       Nicotine Dependence -in remission - Pt quit 1/12/20.  Congratulated on cessation.      HTN - She saw Cards at .  Recent CT showed - A small pericardial effusion is present.  Started amlodipine 2.5 mg/d which she stopped because she thought it was worsening her Skin condition. Stopping it did not change the skin issues.  She is fu by digital BP monitoring program. She is undergoing carnes with Cards.  Cont to monitor.   Pt fu A/I carnes for recent food allergies with lip swelling first.  Pt ran out of olmesartan 5 mg/d. Pt would like a fluid pill to help with " intermittent edema. She will monitor BP closely and alert me for any concerns.  Pt off HCTZ 12.5 mg daily prn edema. Saw Dr. Holloway yet and was started on Lisinopril and crestor. Has fu in sept.      Left renal cyst - 1.8 cm simple parapelvic cyst. Lower pole Left kidney. Reassured.      DM - II -  Ha1c  12.4 -  Cont Metformin  mg  2 tabs po QHS. Will inc to 3 tabs at night and start Ozempic 0.25 mg/wk x 4 wks. no evidence of microalbuminuria.      JANAE on CPAP - She uses CPAP it nightly x 6-7 hrs. Rec low carb diet and exercise for wt loss. Doing well.      Chronic constipation - Doing well at present.  Rec adeq hydration.        Colon polyps - Fu by Dr. Swain. Due. She is sched for fu to set this up.        GERD - Rare. Rec reflux prec. Can take Pepcid prn-rarely needs it.      Vit D def - 30 up from 14 - Pt completed Ergocalciferol once weekly. Pt currently on Vit D3 2000 u/d.       Dishydrotic Eczema- Dx via Bx. Prev fu by Derm - Dr. Sky. Doing better.  She is on light therapy. She can walk and use shoes now.  She is also on topicals.      Chronic Otitis Externa - Fu by ENT, Dr. David.  She was just tx for another infection. She competed 5 days of Abx last wk.     Obesity - BMI - 35.11 -down 4 lb since last visit. Rec low carb diet and exercising.  Plans to resume exercise     HLD - Pt has not started her Crestor that cards started yest.   Atherosclerosis including coronary arteries.  She is on Aspirin 81mg. She declines statin for now.  Her cholesterol was not quite controlled  Her goal LDL is < 70. Cont to monitor.  She is sched for echo and stress test.      Normocytic anemia - H/H - 13/41 - cont to monitor. Rec set up C-scope    Neck pain x 6-8 mos- She would like Ptx referral. C/o stiffness. No numbness/tingling/weakness.     Lab review:   5/22/20 - WBC - 7.6;  H/H - 12.1/36.3; Plt - 338;  BUN/Cr - 12/0.7; LFt's - wnl;   TG 80; Ha1c - 6.9; TSH - 1.52;  Vit D -14      HCM - Flu - none -  refuses;  Tdap -10/28/15;  PCV 13 - none - declines;  PVX 23 - none - declines;  Shingrix - none - declines;  COVID -19 vaccine #1 - declines; MGM - 6/7/23  -repeat 1 yr;  DEXA - 7/23/21 - wnl;  PAP -7/6/21 - neg; Hep C Screen - 6/20/22 - neg;  HIV Screen - none - defers; C-scope - 6/11/18 - repeat 3 yrs - due;  Prev PCP - me at [facility] and then Dr. Armenta; Prev OB/GYN - Dr. Catalan;  GI - Dr. Swain; A/I - Dr. Cordova;  ENT- Dr. David; Derm - Dr. Downey; Ophtho - Dr. Shin; Rheum - Dr. Campoverde; Cards - ? At [facility]; well visit - 1/21/22       Patient Care Team:  Erika Mcclure MD as PCP - General (Internal Medicine)  Brent Wilson MD as Obstetrician (Obstetrics)  Santana Swain MD as Consulting Physician (Gastroenterology)  Daniel Bynum MD as Consulting Physician (General Surgery)  Eugene Fontaan Jr., MD as Consulting Physician (Otolaryngology)  Chong Tang MD as Consulting Physician (Ophthalmology)  Melissa Austin MA as Care Coordinator  Julianne Narvaez as Digital Medicine Health   Ruben BurciagaD as Hypertension Digital Medicine Clinician  Erika Mcclure MD as Hypertension Digital Medicine Responsible Provider (Internal Medicine)  Ruben BurciagaD as Diabetes Digital Medicine Clinician  Erika Mcclure MD as Diabetes Digital Medicine Responsible Provider (Internal Medicine)  Mc[employer] as Diabetes Digital Medicine Contract  McIp as Hypertension Digital Medicine Contract     Health Maintenance:    There is no immunization history on file for this patient.   Health Maintenance   Topic Date Due    LDCT Lung Screen  11/29/2022    Eye Exam  06/07/2023    DEXA Scan  07/23/2023    Hemoglobin A1c  09/14/2023    Foot Exam  05/16/2024    Mammogram  06/07/2024    Lipid Panel  06/14/2024    High Dose Statin  06/21/2024    Aspirin/Antiplatelet Therapy  06/22/2024    TETANUS VACCINE  10/28/2025    Hepatitis C Screening  Completed        Past Medical History:  Past Medical History:   Diagnosis Date     Benign hypertension 8/19/2014    Broken fingers     Broken toe     Colon polyps 8/19/2014    Diabetes mellitus     Elevated fasting glucose 8/19/2014    Family history of bladder cancer     Family history of heart disease 8/19/2014    History of broken nose     Menopause 2009    JANAE on CPAP 11/15/2007    Per PSG: Rx'd CPAP, pressure 13, using small Comfort Select mask or interface of patient's choice, chin strap, and heated humidifier      Pulmonary nodule 12/1/2020    Pyloric stenosis     as a child    Reflux esophagitis 8/19/2014    Per EGD 8/9/2006      Tobacco abuse     Ulcer     at 14 y.o.       Past Surgical History:   has a past surgical history that includes Gastric restriction surgery; Ingra[place] tooth extraction; Upper gastrointestinal endoscopy (2006); Colonoscopy (2018); and Tonsillectomy.    Family History:  family history includes Bladder Cancer in her maternal uncle; COPD in her sister; Dementia in her mother; Diabetes in her father and sister; Hypertension in her brother, mother, and sister; Kidney disease in her mother; Macular degeneration in her mother; Prostate cancer in her father; Psoriasis in her mother; Stroke (age of onset: 60) in her mother; Stroke (age of onset: 75) in her father.     Social History:  Social History     Tobacco Use    Smoking status: Former     Packs/day: 1.50     Years: 40.00     Pack years: 60.00     Types: Cigarettes     Start date: 3/17/1973     Quit date: 1/12/2020     Years since quitting: 3.4    Smokeless tobacco: Never   Substance Use Topics    Alcohol use: Yes     Comment: social - rare    Drug use: Never       Review of Systems   Constitutional:  Positive for diaphoresis. Negative for chills and fever.        Not to the point she changes her clothes or sheets       HENT:  Negative for nasal congestion and sore throat.    Eyes:  Negative for visual disturbance.        Has fu with Ophtho next wk.   Respiratory:  Positive for shortness of breath. Negative for cough,  chest tightness and wheezing.    Cardiovascular:  Positive for palpitations. Negative for chest pain and leg swelling.        When she first starts mopping gets tired and has some palpitations. Thsi goes away as she gets moving.    Gastrointestinal:  Negative for abdominal pain, blood in stool, constipation, diarrhea, nausea and vomiting.   Endocrine: Negative for cold intolerance, heat intolerance and polydipsia.   Genitourinary:  Negative for dysuria and frequency.   Musculoskeletal:  Positive for leg pain. Negative for arthralgias and myalgias.        Sometimes gets calf pain.    Neurological:  Positive for headaches. Negative for dizziness.        Just today   Psychiatric/Behavioral:  Negative for dysphoric mood. The patient is not nervous/anxious.       Medications:    Current Outpatient Medications:     acetic acid-hydrocortisone (VOSOL-HC) otic solution, INT 5 GTS INTO AU 3 XD FOR 10 DAYS, Disp: , Rfl: 3    ascorbic acid, vitamin C, (VITAMIN C) 100 MG tablet, Take 100 mg by mouth once daily., Disp: , Rfl:     aspirin 81 MG Chew, Take 1 tablet (81 mg total) by mouth once daily., Disp: , Rfl: 0    blood sugar diagnostic (ONETOUCH ULTRA TEST) Strp, USE TO TEST BLOOD GLUCOSE ONCE DAILY, Disp: 100 strip, Rfl: 1    blood sugar diagnostic Strp, Accu-Chek Rashida Plus test strips, Disp: , Rfl:     blood sugar diagnostic Strp, To check BG 2 times daily, to use with insurance preferred meter, Disp: 200 strip, Rfl: 3    blood-glucose meter kit, To check BG 2 times daily, to use with insurance preferred meter, Disp: 1 each, Rfl: 0    calcipotriene (DOVONOX) 0.005 % cream, Apply topically 2 (two) times daily. To affected areas on hands and feet., Disp: 120 g, Rfl: 3    clobetasol 0.05% (TEMOVATE) 0.05 % Oint, AAA on hands and feet BID  x 1-2 wks then prn flares only, Disp: 60 g, Rfl: 3    EPINEPHrine (EPIPEN) 0.3 mg/0.3 mL AtIn, Inject 0.3 mLs (0.3 mg total) into the muscle once. for 1 dose, Disp: 2 each, Rfl: 0    lancets  (ONETOUCH DELICA PLUS LANCET) 30 gauge Misc, TO CHECK BLOOD GLUCOSE ONCE DAILY, Disp: 100 each, Rfl: 1    lancets Misc, To check BG 2 times daily, to use with insurance preferred meter, Disp: 200 each, Rfl: 2    lisinopriL 10 MG tablet, Take 1 tablet (10 mg total) by mouth once daily., Disp: 90 tablet, Rfl: 3    metFORMIN (GLUCOPHAGE-XR) 500 MG ER 24hr tablet, TAKE 2 TABLETS BY MOUTH EVERY NIGHT AT BEDTIME, Disp: 180 tablet, Rfl: 1    multivitamin capsule, Take 1 capsule by mouth once daily., Disp: , Rfl:     ONETOUCH DELICA PLUS LANCET 30 gauge Misc, TO CHECK BLOOD GLUCOSE ONCE DAILY, Disp: , Rfl:     ONETOUCH ULTRA2 METER Misc, USE TO CHECK BLOOD GLUCOSE ONCE DAILY, Disp: , Rfl:     sulfacetamide sodium 10% (BLEPH-10) 10 % ophthalmic solution, 1 drop as needed., Disp: , Rfl:     triamcinolone acetonide 0.1% (KENALOG) 0.1 % cream, Apply topically as needed., Disp: , Rfl:     vitamin D (VITAMIN D3) 1000 units Tab, Take 1,000 Units by mouth once daily., Disp: , Rfl:     albuterol (PROVENTIL/VENTOLIN HFA) 90 mcg/actuation inhaler, INHALE 2 PUFFS BY MOUTH EVERY 6 HOURS AS NEEDED FOR WHEEZING. USE WITH SPACER (Patient not taking: Reported on 6/19/2023), Disp: 54 g, Rfl: 1    CIPRODEX 0.3-0.1 % DrpS, SHAKE LIQUID AND INSTILL 4 DROPS TO LEFT EAR TWICE DAILY FOR 7 DAYS, Disp: , Rfl:     coenzyme Q10 (COQ-10) 100 mg capsule, Take 2 once daily, Disp: 1 capsule, Rfl: 0    ELIDEL 1 % cream, Apply topically. Pt takes as needed, Disp: , Rfl:     prednisoLONE acetate (PRED FORTE) 1 % DrpS, , Disp: , Rfl:     rosuvastatin (CRESTOR) 5 MG tablet, Take 1 tablet (5 mg total) by mouth once daily. (Patient not taking: Reported on 6/22/2023), Disp: 90 tablet, Rfl: 3    semaglutide (OZEMPIC) 0.25 mg or 0.5 mg (2 mg/3 mL) pen injector, Inject 0.25 mg into the skin every 7 days x 4 wks and then can inc to 0.5 mg/wk., Disp: 3 mL, Rfl: 0     Allergies:  Review of patient's allergies indicates:   Allergen Reactions    Asparagus Anxiety,  "Dermatitis, Hives, Itching, Rash and Swelling    Codeine Other (See Comments)     psychosis       Physical Exam      Vital Signs  Temp: 97.9 °F (36.6 °C)  Temp Source: Oral  Pulse: 72  Resp: 18  SpO2: 97 %  BP: (!) (P) 160/88  BP Location: (P) Right arm  Patient Position: (P) Sitting  Pain Score: 0-No pain  Height and Weight  Height: 5' 5" (165.1 cm)  Weight: 95.7 kg (210 lb 15.7 oz)  BSA (Calculated - sq m): 2.09 sq meters  BMI (Calculated): 35.1  Weight in (lb) to have BMI = 25: 149.9      Patient Position: (P) Sitting      Physical Exam  Vitals reviewed.   Constitutional:       General: She is not in acute distress.     Appearance: Normal appearance. She is not ill-appearing, toxic-appearing or diaphoretic.   HENT:      Head: Normocephalic and atraumatic.      Mouth/Throat:      Mouth: Mucous membranes are moist.      Pharynx: No posterior oropharyngeal erythema.   Eyes:      Comments: Huy cataracts   Neck:      Vascular: No carotid bruit.      Comments: Dec ROM on extension of neck and rotation to left and lateral flexion to Left >RT  Cardiovascular:      Rate and Rhythm: Normal rate and regular rhythm.      Pulses: Normal pulses.      Heart sounds: Normal heart sounds. No murmur heard.  Pulmonary:      Effort: No respiratory distress.      Breath sounds: Normal breath sounds. No wheezing.   Abdominal:      General: Bowel sounds are normal. There is no distension.      Palpations: Abdomen is soft.      Tenderness: There is no abdominal tenderness. There is no guarding or rebound.   Neurological:      General: No focal deficit present.      Mental Status: She is alert and oriented to person, place, and time.   Psychiatric:         Mood and Affect: Mood normal.         Behavior: Behavior normal.        Laboratory:  CBC:  Recent Labs   Lab 06/20/22  1528 06/14/23  0900   WBC 8.1 7.93   RBC 4.12 L 4.86   Hemoglobin 11.3 L 13.1   Hematocrit 34.4 L 41.7   Platelets 349 333   MCV 83.6 86   MCH 27.6 27.0   MCHC 33.0 " 31.4 L       CMP:  Recent Labs   Lab 03/12/21  1113 05/01/21  0828 06/20/22  1528 06/14/23  0900   Glucose 114 H   < > 106 H 337 H   Calcium 10.2   < > 9.6 9.3   Albumin  --   --   --  3.7   ALBUMIN 4.8  --  4.7  --    Total Protein 8.1  --  7.6 7.4   Sodium 144   < > 142 135 L   Potassium 4.9   < > 3.9 4.2   CO2 28   < > 28 22 L   Chloride 102   < > 103 100   BUN 14   < > 13.0 16   Creatinine 0.7   < > 0.62 0.9   Alkaline Phosphatase 106  --  104 91   ALT 19  --  16 23   AST 19  --  16 16   Total Bilirubin 0.4  --  0.3 0.5    < > = values in this interval not displayed.       LIPIDS:  Recent Labs   Lab 03/12/21  1113 07/12/21  0000 06/20/22  1528 06/14/23  0900   TSH  --   --  1.40 0.965   HDL 57 62 56 60   Cholesterol 202 H 175 179 198   Triglycerides 98 82 86 101   LDL Calculated 129 H 100 109  --    LDL Cholesterol  --   --   --  117.8   HDL/Cholesterol Ratio  --   --   --  30.3   Non-HDL Cholesterol 145 H 113  --  138   Total Cholesterol/HDL Ratio  --   --   --  3.3       TSH:  Recent Labs   Lab 06/20/22  1528 06/14/23  0900   TSH 1.40 0.965       A1C:  Recent Labs   Lab 03/12/21  1113 07/12/21  0000 06/20/22  1528 06/14/23  0900   Hemoglobin A1C 8.0 H 6.0 H 7.1 H 12.4 H       Urine Microalbumin/Cr:  Recent Labs   Lab 06/22/22  1614 06/14/23  0903   Microalb/Creat Ratio 18 20.4         Other:   Recent Labs   Lab 07/06/21  1553   Vit D, 25-Hydroxy 30   Ferritin 121   Iron 38   Transferrin 280   TIBC 414   Saturated Iron 9 L     Recent Labs   Lab 06/20/22  1528   Hepatitis C Ab NON-REACTIVE       Assessment/Plan     Linn Price is a 63 y.o.female with:    Type 2 diabetes mellitus with hyperglycemia, without long-term current use of insulin  -     semaglutide (OZEMPIC) 0.25 mg or 0.5 mg (2 mg/3 mL) pen injector; Inject 0.25 mg into the skin every 7 days x 4 wks and then can inc to 0.5 mg/wk.  Dispense: 3 mL; Refill: 0  - Uncontrolled.  D/w pt risk factors of heart attack and stroke.  Rec compliance with low  carb diet, labs, and follow up.  She reports compliance with her Metformin.  Will inc Metformin to 3 tabs/d and start Ozempic.     Hyperlipidemia, unspecified hyperlipidemia type  -     coenzyme Q10 (COQ-10) 100 mg capsule; Take 2 once daily  Dispense: 1 capsule; Refill: 0  - Rec pt start Crestor as Rx yesterday per cards. Rec low chol diet.    Hypertension associated with diabetes  -Uncontrolled. Rec start Lisinopril as Rx yest per Cards. Rec low sodium diet.     Chronic obstructive pulmonary disease, unspecified COPD type  - Cont prn albuterol. She declines Pneumonia Vaccines.      JANAE on CPAP  - Stable.  Cont current regimen. Rec diet and exercise as discussed for wt loss.       Chronic neck pain  -     Ambulatory referral/consult to Physical/Occupational Therapy; Future; Expected date: 06/29/2023  -     Ambulatory referral/consult to Back & Spine Clinic; Future; Expected date: 06/29/2023  -     X-Ray Cervical Spine AP And Lateral; Future; Expected date: 06/22/2023  - Refer to back and spine and Ptx. Check Xrays.     Decreased ROM of neck  -     Ambulatory referral/consult to Back & Spine Clinic; Future; Expected date: 06/29/2023  -     X-Ray Cervical Spine AP And Lateral; Future; Expected date: 06/22/2023  - Refer to back and spine and Ptx. Check Xrays.     Multiple pulmonary nodules  - Pt sched for CT chest walter.     Class 2 severe obesity due to excess calories with serious comorbidity and body mass index (BMI) of 35.0 to 35.9 in adult  - Rec diet and exercise as discussed for wt loss.        Chronic conditions status updated as per HPI.  Other than changes above, cont current medications and maintain follow up with specialists.   Fu virtual fu in 4 wks.    Erika Mcclure MD  [facility]

## 2023-06-22 NOTE — TELEPHONE ENCOUNTER
No care due was identified.  [facility] Embedded Care Due Messages. Reference number: [id number].   6/22/2023 5:23:17 AM CDT

## 2023-06-23 ENCOUNTER — HOSPITAL ENCOUNTER (OUTPATIENT)
Dept: RADIOLOGY | Facility: HOSPITAL | Age: 63
Discharge: HOME | End: 2023-06-23
Attending: NURSE PRACTITIONER
Payer: MEDICARE

## 2023-06-23 ENCOUNTER — HOSPITAL ENCOUNTER (OUTPATIENT)
Dept: CARDIOLOGY | Facility: HOSPITAL | Age: 63
Discharge: HOME | End: 2023-06-23
Attending: INTERNAL MEDICINE
Payer: MEDICARE

## 2023-06-23 VITALS
BODY MASS INDEX: 34.99 KG/M2 | WEIGHT: 210 LBS | HEART RATE: 75 BPM | DIASTOLIC BLOOD PRESSURE: 80 MMHG | SYSTOLIC BLOOD PRESSURE: 150 MMHG | HEIGHT: 65 IN

## 2023-06-23 DIAGNOSIS — Z12.2 SCREENING FOR LUNG CANCER: ICD-10-CM

## 2023-06-23 DIAGNOSIS — I15.2 HYPERTENSION ASSOCIATED WITH DIABETES: ICD-10-CM

## 2023-06-23 DIAGNOSIS — Z87.891 EX-SMOKER: ICD-10-CM

## 2023-06-23 DIAGNOSIS — E11.59 HYPERTENSION ASSOCIATED WITH DIABETES: ICD-10-CM

## 2023-06-23 DIAGNOSIS — R06.02 SOB (SHORTNESS OF BREATH): ICD-10-CM

## 2023-06-23 LAB
ASCENDING AORTA: 3.45 CM
AV INDEX (PROSTH): 0.84
AV MEAN GRADIENT: 6 MMHG
AV PEAK GRADIENT: 11 MMHG
AV VALVE AREA: 2.45 CM2
AV VELOCITY RATIO: 0.78
BSA FOR ECHO PROCEDURE: 2.09 M2
CV ECHO LV RWT: 0.43 CM
DOP CALC AO PEAK VEL: 1.67 M/S
DOP CALC AO VTI: 36.52 CM
DOP CALC LVOT AREA: 2.9 CM2
DOP CALC LVOT DIAMETER: 1.93 CM
DOP CALC LVOT PEAK VEL: 1.3 M/S
DOP CALC LVOT STROKE VOLUME: 89.56 CM3
DOP CALCLVOT PEAK VEL VTI: 30.63 CM
E WAVE DECELERATION TIME: 198.43 MSEC
E/A RATIO: 1.31
E/E' RATIO: 8 M/S
ECHO LV POSTERIOR WALL: 1.07 CM (ref 0.6–1.1)
EJECTION FRACTION: 65 %
FRACTIONAL SHORTENING: 40 % (ref 28–44)
INTERVENTRICULAR SEPTUM: 1.13 CM (ref 0.6–1.1)
LA MAJOR: 4.99 CM
LA MINOR: 4.75 CM
LA WIDTH: 3.75 CM
LEFT ATRIUM SIZE: 3.43 CM
LEFT ATRIUM VOLUME INDEX MOD: 21.2 ML/M2
LEFT ATRIUM VOLUME INDEX: 26.3 ML/M2
LEFT ATRIUM VOLUME MOD: 42.77 CM3
LEFT ATRIUM VOLUME: 53.21 CM3
LEFT INTERNAL DIMENSION IN SYSTOLE: 3.03 CM (ref 2.1–4)
LEFT VENTRICLE DIASTOLIC VOLUME INDEX: 58.94 ML/M2
LEFT VENTRICLE DIASTOLIC VOLUME: 119.05 ML
LEFT VENTRICLE MASS INDEX: 103 G/M2
LEFT VENTRICLE SYSTOLIC VOLUME INDEX: 17.7 ML/M2
LEFT VENTRICLE SYSTOLIC VOLUME: 35.8 ML
LEFT VENTRICULAR INTERNAL DIMENSION IN DIASTOLE: 5.01 CM (ref 3.5–6)
LEFT VENTRICULAR MASS: 207.81 G
LV LATERAL E/E' RATIO: 8.44 M/S
LV SEPTAL E/E' RATIO: 7.6 M/S
MV A" WAVE DURATION": 9.13 MSEC
MV PEAK A VEL: 0.58 M/S
MV PEAK E VEL: 0.76 M/S
MV STENOSIS PRESSURE HALF TIME: 57.55 MS
MV VALVE AREA P 1/2 METHOD: 3.82 CM2
PISA TR MAX VEL: 2.23 M/S
PULM VEIN S/D RATIO: 1.21
PV PEAK D VEL: 0.28 M/S
PV PEAK S VEL: 0.34 M/S
RA MAJOR: 5.1 CM
RA PRESSURE: 3 MMHG
RA WIDTH: 2.8 CM
RIGHT VENTRICULAR END-DIASTOLIC DIMENSION: 2.98 CM
SINUS: 3.54 CM
STJ: 3.26 CM
TDI LATERAL: 0.09 M/S
TDI SEPTAL: 0.1 M/S
TDI: 0.1 M/S
TR MAX PG: 20 MMHG
TRICUSPID ANNULAR PLANE SYSTOLIC EXCURSION: 2.19 CM
TV REST PULMONARY ARTERY PRESSURE: 23 MMHG

## 2023-06-23 PROCEDURE — 71271 CT THORAX LUNG CANCER SCR C-: CPT | Mod: TC,HCNC

## 2023-06-23 PROCEDURE — 93306 TTE W/DOPPLER COMPLETE: CPT

## 2023-06-23 PROCEDURE — 71271 CT THORAX LUNG CANCER SCR C-: CPT | Mod: 26,HCNC,, | Performed by: RADIOLOGY

## 2023-06-23 PROCEDURE — 71271 CT CHEST LUNG SCREENING LOW DOSE: ICD-10-PCS | Mod: 26,HCNC,, | Performed by: RADIOLOGY

## 2023-06-23 PROCEDURE — 93306 ECHO (CUPID ONLY): ICD-10-PCS | Mod: 26,HCNC,, | Performed by: INTERNAL MEDICINE

## 2023-06-23 PROCEDURE — 93306 TTE W/DOPPLER COMPLETE: CPT | Mod: 26,HCNC,, | Performed by: INTERNAL MEDICINE

## 2023-06-23 NOTE — PROGRESS NOTES
DATE: 7/6/2023  PATIENT: Linn Price    Supervising Physician: Dioni Mondragon M.D.    CHIEF COMPLAINT: neck pain    HISTORY:  Linn Price is a 63 y.o. female with pmhx of DM2 (A1c:12.4) here for initial evaluation of neck pain and stiffness (Neck - 1). The pain has been present for years. The patient describes the pain as aching. The pain is worse with rest and improved by stretch. There is no associated numbness and tingling. There is no subjective weakness. Prior treatments have included PT, but no ESTEFANIA or surgery.     The patient denies myelopathic symptoms such as handwriting changes or difficulty with buttons/coins/keys. Denies perineal paresthesias, bowel/bladder dysfunction.    PAST MEDICAL/SURGICAL HISTORY:  Past Medical History:   Diagnosis Date    Benign hypertension 8/19/2014    Broken fingers     Broken toe     Colon polyps 8/19/2014    Diabetes mellitus     Elevated fasting glucose 8/19/2014    Family history of bladder cancer     Family history of heart disease 8/19/2014    History of broken nose     Menopause 2009    JANAE on CPAP 11/15/2007    Per PSG: Rx'd CPAP, pressure 13, using small Comfort Select mask or interface of patient's choice, chin strap, and heated humidifier      Pulmonary nodule 12/1/2020    Pyloric stenosis     as a child    Reflux esophagitis 8/19/2014    Per EGD 8/9/2006      Tobacco abuse     Ulcer     at 14 y.o.     Past Surgical History:   Procedure Laterality Date    COLONOSCOPY  2018    Benign Polyps     GASTRIC RESTRICTION SURGERY      pyloric stenosis as infant    TONSILLECTOMY      UPPER GASTROINTESTINAL ENDOSCOPY  2006    WISDOM TOOTH EXTRACTION         Medications:  Current Outpatient Medications on File Prior to Visit   Medication Sig Dispense Refill    acetic acid-hydrocortisone (VOSOL-HC) otic solution INT 5 GTS INTO AU 3 XD FOR 10 DAYS  3    albuterol (PROVENTIL/VENTOLIN HFA) 90 mcg/actuation inhaler INHALE 2 PUFFS BY MOUTH EVERY 6 HOURS AS NEEDED FOR  WHEEZING. USE WITH SPACER 54 g 1    ascorbic acid, vitamin C, (VITAMIN C) 100 MG tablet Take 100 mg by mouth once daily.      blood sugar diagnostic (ONETOUCH ULTRA TEST) Strp USE TO TEST BLOOD GLUCOSE ONCE DAILY 100 strip 1    blood sugar diagnostic Strp To check BG 2 times daily, to use with insurance preferred meter 200 strip 3    blood sugar diagnostic Strp 1 each by Misc.(Non-Drug; Combo Route) route 2 (two) times a day. 200 each 3    CIPRODEX 0.3-0.1 % DrpS SHAKE LIQUID AND INSTILL 4 DROPS TO LEFT EAR TWICE DAILY FOR 7 DAYS      clobetasol 0.05% (TEMOVATE) 0.05 % Oint AAA on hands and feet BID  x 1-2 wks then prn flares only 60 g 3    coenzyme Q10 (COQ-10) 100 mg capsule Take 2 once daily 1 capsule 0    lancets (ONETOUCH DELICA PLUS LANCET) 30 gauge Misc TO CHECK BLOOD GLUCOSE ONCE DAILY 100 each 1    lancets Misc To check BG 2 times daily, to use with insurance preferred meter 200 each 2    lisinopriL 10 MG tablet Take 1 tablet (10 mg total) by mouth once daily. 90 tablet 3    metFORMIN (GLUCOPHAGE-XR) 500 MG ER 24hr tablet TAKE 2 TABLETS BY MOUTH EVERY NIGHT AT BEDTIME 180 tablet 1    multivitamin capsule Take 1 capsule by mouth once daily.      ONETOUCH DELICA PLUS LANCET 30 gauge Misc TO CHECK BLOOD GLUCOSE ONCE DAILY      ONETOUCH ULTRA2 METER Misc USE TO CHECK BLOOD GLUCOSE ONCE DAILY      rosuvastatin (CRESTOR) 5 MG tablet Take 1 tablet (5 mg total) by mouth once daily. 90 tablet 3    semaglutide (OZEMPIC) 0.25 mg or 0.5 mg (2 mg/3 mL) pen injector Inject 0.25 mg into the skin every 7 days x 4 wks and then can inc to 0.5 mg/wk. 3 mL 0    sulfacetamide sodium 10% (BLEPH-10) 10 % ophthalmic solution 1 drop as needed.      triamcinolone acetonide 0.1% (KENALOG) 0.1 % cream Apply topically as needed.      vitamin D (VITAMIN D3) 1000 units Tab Take 1,000 Units by mouth once daily.      aspirin 81 MG Chew Take 1 tablet (81 mg total) by mouth once daily.  0    calcipotriene (DOVONOX) 0.005 % cream Apply  topically 2 (two) times daily. To affected areas on hands and feet. 120 g 3    EPINEPHrine (EPIPEN) 0.3 mg/0.3 mL AtIn Inject 0.3 mLs (0.3 mg total) into the muscle once. for 1 dose 2 each 0     No current facility-administered medications on file prior to visit.       Social History:   Social History     Socioeconomic History    Marital status:     Number of children: 0   Tobacco Use    Smoking status: Former     Packs/day: 1.50     Years: 40.00     Pack years: 60.00     Types: Cigarettes     Start date: 3/17/1973     Quit date: 1/12/2020     Years since quitting: 3.4    Smokeless tobacco: Never   Substance and Sexual Activity    Alcohol use: Yes     Comment: social - rare    Drug use: Never    Sexual activity: Not Currently     Partners: Male     Birth control/protection: Post-menopausal     Comment: :       [facility]   2009   Social History Narrative    The patient does not exercise regularly ().    Rates diet as good to fair.    She is not satisfied with weight.         Social Determinants of Health     Financial Resource Strain: Low Risk     Difficulty of Paying Living Expenses: Not very hard   Food Insecurity: No Food Insecurity    Worried About Running Out of Food in the Last Year: Never true    Ran Out of Food in the Last Year: Never true   Transportation Needs: No Transportation Needs    Lack of Transportation (Medical): No    Lack of Transportation (Non-Medical): No   Stress: Stress Concern Present    Feeling of Stress : To some extent   Social Connections: Unknown    Marital Status: Living with partner   Housing Stability: Unknown    Unable to Pay for Housing in the Last Year: No    Unstable Housing in the Last Year: No       REVIEW OF SYSTEMS:  Constitution: Negative. Negative for chills, fever and night sweats.   Cardiovascular: Negative for chest pain and syncope.   Respiratory: Negative for cough and shortness of breath.   Gastrointestinal: See HPI. Negative for nausea/vomiting. Negative for  "abdominal pain.  Genitourinary: See HPI. Negative for discoloration or dysuria.  Skin: Negative for dry skin, itching and rash.   Hematologic/Lymphatic: Negative for bleeding problem. Does not bruise/bleed easily.   Musculoskeletal: Negative for falls and muscle weakness.   Neurological: See HPI. No seizures.   Endocrine: Negative for polydipsia, polyphagia and polyuria.   Allergic/Immunologic: Negative for hives and persistent infections.  Psychiatric/Behavioral: Negative for depression and insomnia.         EXAM:  Ht 5' 5" (1.651 m)   Wt 90.9 kg (200 lb 6.4 oz)   LMP  (LMP Unknown) Comment:    2009  BMI 33.35 kg/m²     General: The patient is a 63 y.o. female in no apparent distress, the patient is oriented to person, place and time.  Psych: Normal mood and affect  HEENT: Vision grossly intact, hearing intact to the spoken word.  Lungs: Respirations unlabored.  Gait: Normal station and gait, no difficulty with toe or heel walk.   Skin: Cervical skin negative for rashes, lesions, hairy patches and surgical scars.  Range of motion: Cervical range of motion is limited. There is mild tenderness to palpation.  Spinal Balance: Global saggital and coronal spinal balance acceptable, no significant for scoliosis and kyphosis.  Musculoskeletal: No pain with the range of motion of the bilateral shoulders and elbows. Normal bulk and contour of the bilateral hands.  Vascular: Bilateral hands warm and well perfused, radial pulses 2+ bilaterally.  Neurological: Normal strength and tone in all major motor groups in the bilateral upper and lower extremities. Normal sensation to light touch in the C5-T1 and L2-S1 dermatomes bilaterally.  Deep tendon reflexes symmetric 2+ in the bilateral upper and lower extremities.  Negative Inverted Radial Reflex and Bland's bilaterally. Negative Babinski bilaterally.     IMAGING:   Today I personally reviewed AP, Lat and Flex/Ex  upright C-spine films that demonstrate bridging anterior " osteophyte from C4-7. Mild DDD throughout.      Body mass index is 33.35 kg/m².    Hemoglobin A1C   Date Value Ref Range Status   06/14/2023 12.4 (H) 4.0 - 5.6 % Final     Comment:     ADA Screening Guidelines:  5.7-6.4%  Consistent with prediabetes  >or=6.5%  Consistent with diabetes    High levels of fetal hemoglobin interfere with the HbA1C  assay. Heterozygous hemoglobin variants (HbS, HgC, etc)do  not significantly interfere with this assay.   However, presence of multiple variants may affect accuracy.     06/20/2022 7.1 (H) 4.5 - 5.7 % Final   07/12/2021 6.0 (H) 4.5 - 5.7 % Final           ASSESSMENT/PLAN:    Diagnoses and all orders for this visit:    Chronic bilateral low back pain without sciatica  -     Ambulatory referral/consult to Physical/Occupational Therapy; Future    Chronic neck pain  -     Ambulatory referral/consult to [facility]    Decreased ROM of neck  -     Ambulatory referral/consult to [facility]    Other orders  -     tiZANidine (ZANAFLEX) 4 MG tablet; Take 1 tablet (4 mg total) by mouth every 8 (eight) hours as needed (muscle spasms).      Today we discussed at length all of the different treatment options including anti-inflammatories, acetaminophen, rest, ice, heat, physical therapy including strengthening and stretching exercises, home exercises, ROM, aerobic conditioning, aqua therapy, other modalities including ultrasound, massage, and dry needling, epidural steroid injections and finally surgical intervention.  I have ordered PT for her neck and low back. She may follow up as needed.

## 2023-06-26 ENCOUNTER — HOSPITAL ENCOUNTER (OUTPATIENT)
Dept: CARDIOLOGY | Facility: HOSPITAL | Age: 63
Discharge: HOME | End: 2023-06-26
Attending: INTERNAL MEDICINE
Payer: MEDICARE

## 2023-06-26 ENCOUNTER — PATIENT MESSAGE (OUTPATIENT)
Dept: PRIMARY CARE CLINIC | Facility: CLINIC | Age: 63
End: 2023-06-26
Payer: MEDICARE

## 2023-06-26 DIAGNOSIS — R06.02 SOB (SHORTNESS OF BREATH): ICD-10-CM

## 2023-06-26 LAB
CV STRESS BASE HR: 68 BPM
DIASTOLIC BLOOD PRESSURE: 71 MMHG
OHS CV CPX 1 MINUTE RECOVERY HEART RATE: 107 BPM
OHS CV CPX 85 PERCENT MAX PREDICTED HEART RATE MALE: 126
OHS CV CPX ESTIMATED METS: 7
OHS CV CPX MAX PREDICTED HEART RATE: 149
OHS CV CPX PATIENT IS FEMALE: 1
OHS CV CPX PATIENT IS MALE: 0
OHS CV CPX PEAK DIASTOLIC BLOOD PRESSURE: 85 MMHG
OHS CV CPX PEAK HEAR RATE: 131 BPM
OHS CV CPX PEAK RATE PRESSURE PRODUCT: NORMAL
OHS CV CPX PEAK SYSTOLIC BLOOD PRESSURE: 210 MMHG
OHS CV CPX PERCENT MAX PREDICTED HEART RATE ACHIEVED: 88
OHS CV CPX RATE PRESSURE PRODUCT PRESENTING: 8704
STRESS ECHO POST EXERCISE DUR MIN: 5 MINUTES
STRESS ECHO POST EXERCISE DUR SEC: 56 SECONDS
SYSTOLIC BLOOD PRESSURE: 128 MMHG

## 2023-06-26 PROCEDURE — 93016 CV STRESS TEST SUPVJ ONLY: CPT | Mod: HCNC,,, | Performed by: INTERNAL MEDICINE

## 2023-06-26 PROCEDURE — 93018 EXERCISE STRESS - EKG (CUPID ONLY): ICD-10-PCS | Mod: HCNC,,, | Performed by: INTERNAL MEDICINE

## 2023-06-26 PROCEDURE — 93018 CV STRESS TEST I&R ONLY: CPT | Mod: HCNC,,, | Performed by: INTERNAL MEDICINE

## 2023-06-26 PROCEDURE — 93016 EXERCISE STRESS - EKG (CUPID ONLY): ICD-10-PCS | Mod: HCNC,,, | Performed by: INTERNAL MEDICINE

## 2023-06-26 PROCEDURE — 93017 CV STRESS TEST TRACING ONLY: CPT

## 2023-06-26 NOTE — PROGRESS NOTES
I sent pt a my chart message -  I reviewed your CT chest - It showed:  A Stable 7 mm pleural base nodule right middle lobe.  Stable tiny scattered nodules measuring up to 3 mm in the right lung. Minimal atherosclerosis (plaque in the arteries). Age-appropriate degenerative changes (Arthritis). Hepatic steatosis (Fatty Liver) and hepatomegaly (slight liver enlargement likely due to your fatty Liver).   We  will continue annual screening with repeat Low dose CT in 1 year.   Dr. YEAGER

## 2023-06-26 NOTE — TELEPHONE ENCOUNTER
No care due was identified.  [facility] Embedded Care Due Messages. Reference number: [id number].   6/26/2023 4:32:10 PM CDT

## 2023-06-27 ENCOUNTER — PATIENT MESSAGE (OUTPATIENT)
Dept: ADMINISTRATIVE | Facility: OTHER | Age: 63
End: 2023-06-27
Payer: MEDICARE

## 2023-06-28 ENCOUNTER — HOSPITAL ENCOUNTER (OUTPATIENT)
Dept: RADIOLOGY | Facility: HOSPITAL | Age: 63
Discharge: HOME | End: 2023-06-28
Attending: INTERNAL MEDICINE
Payer: MEDICARE

## 2023-06-28 DIAGNOSIS — R29.898 DECREASED ROM OF NECK: ICD-10-CM

## 2023-06-28 DIAGNOSIS — G89.29 CHRONIC NECK PAIN: ICD-10-CM

## 2023-06-28 DIAGNOSIS — M54.2 CHRONIC NECK PAIN: ICD-10-CM

## 2023-06-28 PROCEDURE — 72040 X-RAY EXAM NECK SPINE 2-3 VW: CPT | Mod: 26,,, | Performed by: RADIOLOGY

## 2023-06-28 PROCEDURE — 72040 XR CERVICAL SPINE AP LATERAL: ICD-10-PCS | Mod: 26,,, | Performed by: RADIOLOGY

## 2023-06-28 PROCEDURE — 72040 X-RAY EXAM NECK SPINE 2-3 VW: CPT | Mod: TC

## 2023-06-29 ENCOUNTER — CLINICAL SUPPORT (OUTPATIENT)
Dept: REHABILITATION | Facility: HOSPITAL | Age: 63
End: 2023-06-29
Attending: INTERNAL MEDICINE
Payer: MEDICARE

## 2023-06-29 DIAGNOSIS — R29.3 POSTURE ABNORMALITY: ICD-10-CM

## 2023-06-29 DIAGNOSIS — M53.82 IMPAIRED RANGE OF MOTION OF CERVICAL SPINE: ICD-10-CM

## 2023-06-29 DIAGNOSIS — R29.898 DECREASED STRENGTH OF UPPER EXTREMITY: ICD-10-CM

## 2023-06-29 DIAGNOSIS — M54.2 CHRONIC NECK PAIN: ICD-10-CM

## 2023-06-29 DIAGNOSIS — G89.29 CHRONIC NECK PAIN: ICD-10-CM

## 2023-06-29 PROCEDURE — 97161 PT EVAL LOW COMPLEX 20 MIN: CPT | Mod: HCNC

## 2023-06-29 PROCEDURE — 97110 THERAPEUTIC EXERCISES: CPT | Mod: HCNC

## 2023-06-29 PROCEDURE — 97140 MANUAL THERAPY 1/> REGIONS: CPT | Mod: HCNC
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
## 2023-07-03 NOTE — PROGRESS NOTES
I sent pt a my chart message -  I reviewed your Xrays of your Cervical spine which showed changes consistent with Osteoarthritis.   No acute fractures.   Dr. YEAGER

## 2023-07-05 ENCOUNTER — LAB VISIT (OUTPATIENT)
Dept: LAB | Facility: HOSPITAL | Age: 63
End: 2023-07-05
Attending: INTERNAL MEDICINE
Payer: MEDICARE

## 2023-07-05 DIAGNOSIS — E78.5 HYPERLIPIDEMIA ASSOCIATED WITH TYPE 2 DIABETES MELLITUS: ICD-10-CM

## 2023-07-05 DIAGNOSIS — E11.69 HYPERLIPIDEMIA ASSOCIATED WITH TYPE 2 DIABETES MELLITUS: ICD-10-CM

## 2023-07-05 LAB
ANION GAP SERPL CALC-SCNC: 8 MMOL/L (ref 8–16)
BUN SERPL-MCNC: 14 MG/DL (ref 8–23)
CALCIUM SERPL-MCNC: 9.9 MG/DL (ref 8.7–10.5)
CHLORIDE SERPL-SCNC: 103 MMOL/L (ref 95–110)
CO2 SERPL-SCNC: 28 MMOL/L (ref 23–29)
CREAT SERPL-MCNC: 0.9 MG/DL (ref 0.5–1.4)
EST. GFR  (NO RACE VARIABLE): >60 ML/MIN/1.73 M^2
GLUCOSE SERPL-MCNC: 140 MG/DL (ref 70–110)
POTASSIUM SERPL-SCNC: 4.7 MMOL/L (ref 3.5–5.1)
SODIUM SERPL-SCNC: 139 MMOL/L (ref 136–145)

## 2023-07-05 PROCEDURE — 80048 BASIC METABOLIC PNL TOTAL CA: CPT | Mod: HCNC | Performed by: INTERNAL MEDICINE

## 2023-07-05 PROCEDURE — 36415 COLL VENOUS BLD VENIPUNCTURE: CPT | Performed by: INTERNAL MEDICINE

## 2023-07-06 ENCOUNTER — CLINICAL SUPPORT (OUTPATIENT)
Dept: REHABILITATION | Facility: HOSPITAL | Age: 63
End: 2023-07-06
Payer: MEDICARE

## 2023-07-06 ENCOUNTER — OFFICE VISIT (OUTPATIENT)
Dept: ORTHOPEDICS | Facility: CLINIC | Age: 63
End: 2023-07-06
Payer: MEDICARE

## 2023-07-06 VITALS — WEIGHT: 200.38 LBS | BODY MASS INDEX: 33.38 KG/M2 | HEIGHT: 65 IN

## 2023-07-06 DIAGNOSIS — R29.3 POSTURE ABNORMALITY: Primary | ICD-10-CM

## 2023-07-06 DIAGNOSIS — G89.29 CHRONIC BILATERAL LOW BACK PAIN WITHOUT SCIATICA: Primary | ICD-10-CM

## 2023-07-06 DIAGNOSIS — R29.898 DECREASED STRENGTH OF UPPER EXTREMITY: ICD-10-CM

## 2023-07-06 DIAGNOSIS — R29.898 DECREASED ROM OF NECK: ICD-10-CM

## 2023-07-06 DIAGNOSIS — M54.50 CHRONIC BILATERAL LOW BACK PAIN WITHOUT SCIATICA: Primary | ICD-10-CM

## 2023-07-06 DIAGNOSIS — M54.2 CHRONIC NECK PAIN: ICD-10-CM

## 2023-07-06 DIAGNOSIS — M53.82 IMPAIRED RANGE OF MOTION OF CERVICAL SPINE: ICD-10-CM

## 2023-07-06 DIAGNOSIS — G89.29 CHRONIC NECK PAIN: ICD-10-CM

## 2023-07-06 PROCEDURE — 3046F HEMOGLOBIN A1C LEVEL >9.0%: CPT | Mod: HCNC,CPTII,S$GLB, | Performed by: REGISTERED NURSE

## 2023-07-06 PROCEDURE — 3066F NEPHROPATHY DOC TX: CPT | Mod: HCNC,CPTII,S$GLB, | Performed by: REGISTERED NURSE

## 2023-07-06 PROCEDURE — 3061F PR NEG MICROALBUMINURIA RESULT DOCUMENTED/REVIEW: ICD-10-PCS | Mod: HCNC,CPTII,S$GLB, | Performed by: REGISTERED NURSE

## 2023-07-06 PROCEDURE — 97112 NEUROMUSCULAR REEDUCATION: CPT | Mod: HCNC,CQ

## 2023-07-06 PROCEDURE — 3046F PR MOST RECENT HEMOGLOBIN A1C LEVEL > 9.0%: ICD-10-PCS | Mod: HCNC,CPTII,S$GLB, | Performed by: REGISTERED NURSE

## 2023-07-06 PROCEDURE — 99204 PR OFFICE/OUTPT VISIT, NEW, LEVL IV, 45-59 MIN: ICD-10-PCS | Mod: HCNC,S$GLB,, | Performed by: REGISTERED NURSE

## 2023-07-06 PROCEDURE — 97140 MANUAL THERAPY 1/> REGIONS: CPT | Mod: HCNC,CQ

## 2023-07-06 PROCEDURE — 4010F ACE/ARB THERAPY RXD/TAKEN: CPT | Mod: HCNC,CPTII,S$GLB, | Performed by: REGISTERED NURSE

## 2023-07-06 PROCEDURE — 3008F PR BODY MASS INDEX (BMI) DOCUMENTED: ICD-10-PCS | Mod: HCNC,CPTII,S$GLB, | Performed by: REGISTERED NURSE

## 2023-07-06 PROCEDURE — 4010F PR ACE/ARB THEARPY RXD/TAKEN: ICD-10-PCS | Mod: HCNC,CPTII,S$GLB, | Performed by: REGISTERED NURSE

## 2023-07-06 PROCEDURE — 99204 OFFICE O/P NEW MOD 45 MIN: CPT | Mod: HCNC,S$GLB,, | Performed by: REGISTERED NURSE

## 2023-07-06 PROCEDURE — 1159F PR MEDICATION LIST DOCUMENTED IN MEDICAL RECORD: ICD-10-PCS | Mod: HCNC,CPTII,S$GLB, | Performed by: REGISTERED NURSE

## 2023-07-06 PROCEDURE — 3066F PR DOCUMENTATION OF TREATMENT FOR NEPHROPATHY: ICD-10-PCS | Mod: HCNC,CPTII,S$GLB, | Performed by: REGISTERED NURSE

## 2023-07-06 PROCEDURE — 1159F MED LIST DOCD IN RCRD: CPT | Mod: HCNC,CPTII,S$GLB, | Performed by: REGISTERED NURSE

## 2023-07-06 PROCEDURE — 99999 PR PBB SHADOW E&M-EST. PATIENT-LVL IV: CPT | Mod: PBBFAC,HCNC,, | Performed by: REGISTERED NURSE

## 2023-07-06 PROCEDURE — 3061F NEG MICROALBUMINURIA REV: CPT | Mod: HCNC,CPTII,S$GLB, | Performed by: REGISTERED NURSE

## 2023-07-06 PROCEDURE — 99999 PR PBB SHADOW E&M-EST. PATIENT-LVL IV: ICD-10-PCS | Mod: PBBFAC,HCNC,, | Performed by: REGISTERED NURSE

## 2023-07-06 PROCEDURE — 3008F BODY MASS INDEX DOCD: CPT | Mod: HCNC,CPTII,S$GLB, | Performed by: REGISTERED NURSE

## 2023-07-06 RX ORDER — TIZANIDINE 4 MG/1
4 TABLET ORAL EVERY 8 HOURS PRN
Qty: 60 TABLET | Refills: 2 | Status: SHIPPED | OUTPATIENT
Start: 2023-07-06

## 2023-07-06 NOTE — PROGRESS NOTES
I sent pt a my chart message -  I reviewed your labs. Your sodium and potassium were normal. Your kidney function looked good.     Dr. YEAGER

## 2023-07-06 NOTE — PROGRESS NOTES
Physical Therapy Daily Treatment Note     Name: Linn Price  Clinic Number: [id number]  Therapy Diagnosis:        Encounter Diagnoses   Name Primary?    Chronic neck pain      Posture abnormality      Decreased strength of upper extremity      Impaired range of motion of cervical spine        Physician: Erika Mcclure MD     Physician Orders: PT Eval and Treat   Medical Diagnosis from Referral: M54.2,G89.29 (ICD-10-CM) - Chronic neck pain  Evaluation Date: 6/29/2023  Authorization Period Expiration: 12/31/2023  Plan of Care Expiration: 09/20/2023  Progress Note Due: 08/24/2023  Visit # / Visits authorized: 2/ 20  FOTO: given at initial evaluation       Precautions: Standard, Diabetes, blood thinners, and elevated blood glucose      Time In: 1300  Time Out: 1330  Total Appointment Time (timed & untimed codes): 30 minutes  Subjective     Pt reports: can only stay for 30' due to another appointment. Mod neck pain mostly on L side.   She was compliant with home exercise program.  Response to previous treatment: no issues mentioned   Functional change: chronic pain     Pain: 4/10  Location: neck  > L     Objective     Linn received therapeutic exercises to develop strength, endurance, ROM, flexibility, posture, and core stabilization for  minutes including:    Linn received the following manual therapy techniques: Joint mobilizations, Manual traction, and Soft tissue Mobilization were applied to the: C/S for 15 minutes, including:  Occipital release, c/s distraction, c/s lateral glides, UT and levator stretch    Linn participated in neuromuscular re-education activities to improve: Balance, Coordination, Kinesthetic, Sense, Proprioception, and Posture for 10'  minutes. The following activities were included:  Chin tuck with BP cuff  T/C   Supine cervical rotation R/L     Linn participated in dynamic functional therapeutic activities to improve functional performance for 6 minutes, including:  UBE  3'/3' pulling/pushing with moist heat     Home Exercises Provided and Patient Education Provided     Education provided:   - Compliance with HEP     Written Home Exercises Provided: yes.  Exercises were reviewed and Linn was able to demonstrate them prior to the end of the session.  Linn demonstrated good  understanding of the education provided.     Assessment   Pt with shortened session due another appointment. Min pain relief and tightness noted after manual therapy. Chin tucks improved with TC using BP cuff and VC. Forward head posture, rounded shoulders, Very tight upper traps. Instructed on compliance with HEP per Supervising PT.     Linn Is progressing well towards her goals.   Pt prognosis is Good.     Pt will continue to benefit from skilled outpatient physical therapy to address the deficits listed in the problem list box on initial evaluation, provide pt/family education and to maximize pt's level of independence in the home and community environment.     Pt's spiritual, cultural and educational needs considered and pt agreeable to plan of care and goals.    Anticipated barriers to physical therapy: none     Goals: Goals:  Short Term Goals: 4-6 weeks  1. Pt will be compliant with HEP 50% of prescribed amount.   2. The pt to demo improvement in pain free cervical left rotation by 10% of full ROM.  3.  Report decreased neck pain  <   / =  2 /10  to increase tolerance for adls, work.  4. Pt will demo improvement in midline cervical alignment in sitting to improve posture.      Long Term Goals: 8-10 weeks   Pt will be compliant with % of prescribed amount.   Patient will improve their FOTO limitation score to at least 36% as evidence of clinically significant improvements in their function.  Pt will demo improvement in 20% of cervical left rotation to equal R cervical rotation.  The pt will report full participation in ADLs and IADLs without restrictions related to neck pain.         Plan      Continue with POC     Anderson Harris, PTA, STS

## 2023-07-10 ENCOUNTER — PATIENT OUTREACH (OUTPATIENT)
Dept: ADMINISTRATIVE | Facility: HOSPITAL | Age: 63
End: 2023-07-10
Payer: MEDICARE

## 2023-07-10 NOTE — PROGRESS NOTES
Health Maintenance Due   Topic Date Due    HIV Screening  Never done    Aspirin/Antiplatelet Therapy  Never done    Colorectal Cancer Screening  06/11/2021    Eye Exam  06/07/2023    DEXA Scan  07/23/2023        Chart review done.   HM updated.   Immunizations reviewed & updated.   Care Everywhere updated.

## 2023-07-16 NOTE — PROGRESS NOTES
"[facility] Note    Chief Complaint    No chief complaint on file.      History of Present Illness      Linn Price is a 63 y.o.  WF with HTN, DM - II, JANAE on CPAP, Chronic Constipation, GERD, Colon polyps,Pulm nodule, Noncompliance, and Nicotine Dependence in Remission presents to  well visit and chronic issues. Last virtual visit - 6/22/23    The patient location is: "Home"  The chief complaint leading to consultation is:  chronic issues    Visit type: audiovisual    Face to Face time with patient: 19 min.   19 minutes of total time spent on the encounter, which includes face to face time and non-face to face time preparing to see the patient (eg, review of tests), Obtaining and/or reviewing separately obtained history, Documenting clinical information in the electronic or other health record, Independently interpreting results (not separately reported) and communicating results to the patient/family/caregiver, or Care coordination (not separately reported).         Each patient to whom he or she provides medical services by telemedicine is:  (1) informed of the relationship between the physician and patient and the respective role of any other health care provider with respect to management of the patient; and (2) notified that he or she may decline to receive medical services by telemedicine and may withdraw from such care at any time.    Notes:      H/o Hyponatremia - Resolved - 139.         Noncompliance -   Per digital med program note - 12/7/22 "Not taking HTN medicines." Not measuring BP, reports that she has been too busy". Per  cards note, 6/21/23 - "Hasn't been taking any meds recently ". Pt reports she is no longer missing her meds.     Pulm nodules - CT lung Ca screen - 6/23/23 - A Stable 7 mm pleural base nodule right middle lobe.  Stable tiny scattered nodules measuring up to 3 mm in the right lung. Minimal atherosclerosis (plaque in the arteries). Age-appropriate " degenerative changes (Arthritis). Hepatic steatosis (Fatty Liver) and hepatomegaly (slight liver enlargement likely due to your fatty Liver). We  will continue annual screening with repeat Low dose CT in 1 year.      Thyroid nodule - Thyroid U/S - 5/25/21- small (9mm) nodule that does not require further intervention of follow up.     COPD - Pt with prev PFT's at  - + Obstruction. Cont prn albuterol - has not needed.  +SOB with house work.       Nicotine Dependence -in remission - Pt quit 1/12/20. Congratulated on cessation.      HTN - She saw [facility], Dr. Holloway.  Recent CT showed - A small pericardial effusion is present.  Started amlodipine 2.5 mg/d which she stopped because she thought it was worsening her Skin condition. Stopping it did not change the skin issues.  She is fu by digital BP monitoring program.   Cont to monitor.   Pt fu A/I carnes for recent food allergies with lip swelling first. Pt would like a fluid pill to help with intermittent edema. She will monitor BP closely and alert me for any concerns.  Pt off HCTZ 12.5 mg daily prn edema. Saw Dr. Holloway and was started on Lisinopril and crestor (she did not start Crestor). Has fu in sept.  She will discuss her recent recs from Dr. Asher with Dr. Holloway. She reports being told she is a candidate for ablation.  Echo - 6/23/23 - mild concentric hypertrophy and normal systolic function. EF 65%; Grade I Diastolic dysf. Mild aortic regurg. Exercise Stress Test - 6/26/23  - neg.     Left renal cyst - 1.8 cm simple parapelvic cyst. Lower pole Left kidney. Reassured.      DM - II -  Ha1c  12.4 -  Cont Metformin  mg 2 tabs po QHS and Ozempic 0.25 mg/wk. She had not bumped Metformin to 3 tabs so will cont 2 tabs for now as we are inc Ozempic to 0.5 mg/wk. No evidence of microalbuminuria. Glc has improved to 147 fasting this AM, 111 before lunch today,  and after lunch 135 today. Will inc to 0.5 mg/wk with her next dose. She reports some constipation.  Rec inc  hydration and try stool softeners or Miralax prn. Has a BM Q3-4 days.      JANAE on CPAP - She uses CPAP it nightly x 6-7 hrs. Rec low carb diet and exercise for wt loss. Doing well.      Chronic constipation - Doing well at present.  Rec adeq hydration.   Rec adeq hydration and Miralax prn.      Colon polyps - Fu by Dr. Swain. Due. She is sched for fu in Aug.        GERD - Rare. Rec reflux prec. Can take Pepcid prn-rarely needs it.      Vit D def - 30 up from 14- Pt completed Ergocalciferol once weekly. Pt currently on Vit D3 2000 u/d.       Dishydrotic Eczema- Dx via Bx. Prev fu by Derm - Dr. Sky. Doing better.  She is on light therapy. She can walk and use shoes now.  She is also on topicals.      Chronic Otitis Externa - Fu by ENT, Dr. David.  Pt on Acetic acid drops prn.      Obesity - BMI - 32.87 -down 4 lb since last visit. Rec low carb diet and exercising.  Plans to resume exercise     HLD - Pt has not started her Crestor that cards started. She declines. I asked her to reconsider. Atherosclerosis including coronary arteries.  She is on Aspirin 81mg. She declines statin for now.  Her cholesterol was not quite controlled Her goal LDL is < 70. Cont to monitor.        Normocytic anemia - H/H - 13/41 - cont to monitor. Planning for  C-scope     Neck pain x 6-8 mos- She is in Ptx for her neck. C/o stiffness. No numbness/tingling/weakness. + DDD. Fu by [facility].      Lab review:   5/22/20 - WBC - 7.6;  H/H - 12.1/36.3; Plt - 338;  BUN/Cr - 12/0.7; LFt's - wnl;   TG 80; Ha1c - 6.9; TSH - 1.52;  Vit D -14      HCM - Flu - none - refuses;  Tdap -10/28/15;  PCV 13 - none - declines;  PVX 23 - none - declines;  Shingrix - none - declines;  COVID -19 vaccine #1 - declines; MGM - 6/7/23  -repeat 1 yr;  DEXA - 7/23/21 - wnl;  PAP -7/6/21 - neg; Hep C Screen - 6/20/22 - neg;  HIV Screen - none - defers; C-scope - 6/11/18 - repeat 3 yrs - due;  Prev PCP - me at [facility] and then Dr. Armenta; Prev OB/GYN -   Hossein;  GI - Dr. Swain; A/I - Dr. Cordova;  ENT- Dr. David; Derm - Dr. Downey; Ophtho - Dr. Shin; Rheum - Dr. Campoverde; Cards - ? At [facility]; well visit - 1/21/22     Patient Care Team:  Erika Mcclure MD as PCP - General (Internal Medicine)  Brent Wilson MD as Obstetrician (Obstetrics)  Santana Swain MD as Consulting Physician (Gastroenterology)  Daniel Bynum MD as Consulting Physician (General Surgery)  Eugene Fontana Jr., MD as Consulting Physician (Otolaryngology)  Chong Tang MD as Consulting Physician (Ophthalmology)  Melissa Austin MA as Care Coordinator  Julianne Narvaez as Digital Medicine Health   Charlotte Diaz PharmD as Hypertension Digital Medicine Clinician  Erika Mcclure MD as Hypertension Digital Medicine Responsible Provider (Internal Medicine)  Ruben BurciagaD as Diabetes Digital Medicine Clinician  Erika Mcclure MD as Diabetes Digital Medicine Responsible Provider (Internal Medicine)  [email or web address] as Hypertension Digital Medicine Contract  [employer] Man[employer] Medicare as Diabetes Digital Medicine Contract     Health Maintenance:    There is no immunization history on file for this patient.   Health Maintenance   Topic Date Due    Aspirin/Antiplatelet Therapy  Never done    Eye Exam  06/07/2023    DEXA Scan  07/23/2023    Hemoglobin A1c  09/14/2023    Foot Exam  05/16/2024    Mammogram  06/07/2024    Lipid Panel  06/14/2024    LDCT Lung Screen  06/23/2024    High Dose Statin  07/06/2024    TETANUS VACCINE  10/28/2025    Hepatitis C Screening  Completed        Past Medical History:  Past Medical History:   Diagnosis Date    Benign hypertension 8/19/2014    Broken fingers     Broken toe     Colon polyps 8/19/2014    Diabetes mellitus     Elevated fasting glucose 8/19/2014    Family history of bladder cancer     Family history of heart disease 8/19/2014    History of broken nose     Menopause 2009    JANAE on CPAP 11/15/2007    Per PSG: Rx'd CPAP,  pressure 13, using small Comfort Select mask or interface of patient's choice, chin strap, and heated humidifier      Pulmonary nodule 12/1/2020    Pyloric stenosis     as a child    Reflux esophagitis 8/19/2014    Per EGD 8/9/2006      Tobacco abuse     Ulcer     at 14 y.o.       Past Surgical History:   has a past surgical history that includes Gastric restriction surgery; [place] tooth extraction; Upper gastrointestinal endoscopy (2006); Colonoscopy (2018); and Tonsillectomy.    Family History:  family history includes Bladder Cancer in her maternal uncle; COPD in her sister; Dementia in her mother; Diabetes in her father and sister; Hypertension in her brother, mother, and sister; Kidney disease in her mother; Macular degeneration in her mother; Prostate cancer in her father; Psoriasis in her mother; Stroke (age of onset: 60) in her mother; Stroke (age of onset: 75) in her father.     Social History:  Social History     Tobacco Use    Smoking status: Former     Packs/day: 1.50     Years: 40.00     Pack years: 60.00     Types: Cigarettes     Start date: 3/17/1973     Quit date: 1/12/2020     Years since quitting: 3.5    Smokeless tobacco: Never   Substance Use Topics    Alcohol use: Yes     Comment: social - rare    Drug use: Never       Review of Systems   Constitutional:  Positive for fatigue.   Respiratory:  Positive for shortness of breath. Negative for chest tightness.    Cardiovascular:  Negative for chest pain.   Gastrointestinal:  Positive for constipation and nausea. Negative for diarrhea and vomiting.        A little nausea after she eats after she takes the Ozempic.    Endocrine: Negative for polydipsia, polyphagia and polyuria.   Integumentary:  Negative for pallor.   Neurological:  Negative for dizziness, tremors, seizures, speech difficulty, weakness and headaches.   Psychiatric/Behavioral:  Negative for confusion. The patient is not nervous/anxious.       Medications:    Current Outpatient  Medications:     acetic acid-hydrocortisone (VOSOL-HC) otic solution, INT 5 GTS INTO AU 3 XD FOR 10 DAYS, Disp: , Rfl: 3    albuterol (PROVENTIL/VENTOLIN HFA) 90 mcg/actuation inhaler, INHALE 2 PUFFS BY MOUTH EVERY 6 HOURS AS NEEDED FOR WHEEZING. USE WITH SPACER, Disp: 54 g, Rfl: 1    ascorbic acid, vitamin C, (VITAMIN C) 100 MG tablet, Take 100 mg by mouth once daily., Disp: , Rfl:     aspirin 81 MG Chew, Take 1 tablet (81 mg total) by mouth once daily., Disp: , Rfl: 0    blood sugar diagnostic (ONETOUCH ULTRA TEST) Strp, USE TO TEST BLOOD GLUCOSE ONCE DAILY, Disp: 100 strip, Rfl: 1    blood sugar diagnostic Strp, To check BG 2 times daily, to use with insurance preferred meter, Disp: 200 strip, Rfl: 3    blood sugar diagnostic Strp, 1 each by Misc.(Non-Drug; Combo Route) route 2 (two) times a day., Disp: 200 each, Rfl: 3    calcipotriene (DOVONOX) 0.005 % cream, Apply topically 2 (two) times daily. To affected areas on hands and feet., Disp: 120 g, Rfl: 3    CIPRODEX 0.3-0.1 % DrpS, SHAKE LIQUID AND INSTILL 4 DROPS TO LEFT EAR TWICE DAILY FOR 7 DAYS, Disp: , Rfl:     clobetasol 0.05% (TEMOVATE) 0.05 % Oint, AAA on hands and feet BID  x 1-2 wks then prn flares only, Disp: 60 g, Rfl: 3    coenzyme Q10 (COQ-10) 100 mg capsule, Take 2 once daily, Disp: 1 capsule, Rfl: 0    EPINEPHrine (EPIPEN) 0.3 mg/0.3 mL AtIn, Inject 0.3 mLs (0.3 mg total) into the muscle once. for 1 dose, Disp: 2 each, Rfl: 0    lancets (ONETOUCH DELICA PLUS LANCET) 30 gauge Misc, TO CHECK BLOOD GLUCOSE ONCE DAILY, Disp: 100 each, Rfl: 1    lancets Misc, To check BG 2 times daily, to use with insurance preferred meter, Disp: 200 each, Rfl: 2    lisinopriL 10 MG tablet, Take 1 tablet (10 mg total) by mouth once daily., Disp: 90 tablet, Rfl: 3    metFORMIN (GLUCOPHAGE-XR) 500 MG ER 24hr tablet, TAKE 2 TABLETS BY MOUTH EVERY NIGHT AT BEDTIME, Disp: 180 tablet, Rfl: 1    multivitamin capsule, Take 1 capsule by mouth once daily., Disp: , Rfl:      ONETOUCH DELICA PLUS LANCET 30 gauge Misc, TO CHECK BLOOD GLUCOSE ONCE DAILY, Disp: , Rfl:     ONETOUCH ULTRA2 METER Misc, USE TO CHECK BLOOD GLUCOSE ONCE DAILY, Disp: , Rfl:     rosuvastatin (CRESTOR) 5 MG tablet, Take 1 tablet (5 mg total) by mouth once daily., Disp: 90 tablet, Rfl: 3    semaglutide (OZEMPIC) 0.25 mg or 0.5 mg (2 mg/3 mL) pen injector, Inject 0.25 mg into the skin every 7 days x 4 wks and then can inc to 0.5 mg/wk., Disp: 3 mL, Rfl: 0    sulfacetamide sodium 10% (BLEPH-10) 10 % ophthalmic solution, 1 drop as needed., Disp: , Rfl:     tiZANidine (ZANAFLEX) 4 MG tablet, Take 1 tablet (4 mg total) by mouth every 8 (eight) hours as needed (muscle spasms)., Disp: 60 tablet, Rfl: 2    triamcinolone acetonide 0.1% (KENALOG) 0.1 % cream, Apply topically as needed., Disp: , Rfl:     vitamin D (VITAMIN D3) 1000 units Tab, Take 1,000 Units by mouth once daily., Disp: , Rfl:      Allergies:  Review of patient's allergies indicates:   Allergen Reactions    Asparagus Anxiety, Dermatitis, Hives, Itching, Rash and Swelling    Codeine Other (See Comments)     psychosis       Physical Exam                    Physical Exam  Constitutional:       General: She is not in acute distress.     Appearance: Normal appearance. She is not ill-appearing, toxic-appearing or diaphoretic.   HENT:      Head: Normocephalic and atraumatic.   Pulmonary:      Effort: No respiratory distress.   Neurological:      General: No focal deficit present.      Mental Status: She is alert and oriented to person, place, and time.   Psychiatric:         Mood and Affect: Mood normal.         Behavior: Behavior normal.        Laboratory:  CBC:  Recent Labs   Lab 06/20/22  1528 06/14/23  0900   WBC 8.1 7.93   RBC 4.12 L 4.86   Hemoglobin 11.3 L 13.1   Hematocrit 34.4 L 41.7   Platelets 349 333   MCV 83.6 86   MCH 27.6 27.0   MCHC 33.0 31.4 L       CMP:  Recent Labs   Lab 03/12/21  1113 05/01/21  0828 06/20/22  1528 06/14/23  0900 07/05/23  1313    Glucose 114 H   < > 106 H 337 H 140 H   Calcium 10.2   < > 9.6 9.3 9.9   Albumin  --   --   --  3.7  --    ALBUMIN 4.8  --  4.7  --   --    Total Protein 8.1  --  7.6 7.4  --    Sodium 144   < > 142 135 L 139   Potassium 4.9   < > 3.9 4.2 4.7   CO2 28   < > 28 22 L 28   Chloride 102   < > 103 100 103   BUN 14   < > 13.0 16 14   Creatinine 0.7   < > 0.62 0.9 0.9   Alkaline Phosphatase 106  --  104 91  --    ALT 19  --  16 23  --    AST 19  --  16 16  --    Total Bilirubin 0.4  --  0.3 0.5  --     < > = values in this interval not displayed.          LIPIDS:  Recent Labs   Lab 03/12/21  1113 07/12/21  0000 06/20/22  1528 06/14/23  0900   TSH  --   --  1.40 0.965   HDL 57 62 56 60   Cholesterol 202 H 175 179 198   Triglycerides 98 82 86 101   LDL Calculated 129 H 100 109  --    LDL Cholesterol  --   --   --  117.8   HDL/Cholesterol Ratio  --   --   --  30.3   Non-HDL Cholesterol 145 H 113  --  138   Total Cholesterol/HDL Ratio  --   --   --  3.3       TSH:  Recent Labs   Lab 06/20/22  1528 06/14/23  0900   TSH 1.40 0.965       A1C:  Recent Labs   Lab 03/12/21  1113 07/12/21  0000 06/20/22  1528 06/14/23  0900   Hemoglobin A1C 8.0 H 6.0 H 7.1 H 12.4 H       Urine Microalbumin/Cr:  Recent Labs   Lab 06/22/22  1614 06/14/23  0903   Microalb/Creat Ratio 18 20.4         Other:   Recent Labs   Lab 07/06/21  1553   Vit D, 25-Hydroxy 30   Ferritin 121   Iron 38   Transferrin 280   TIBC 414   Saturated Iron 9 L     Recent Labs   Lab 06/20/22  1528   Hepatitis C Ab NON-REACTIVE       Assessment/Plan     Linn Price is a 63 y.o.female with:    Type 2 diabetes mellitus without complication, without long-term current use of insulin  -     semaglutide (OZEMPIC) 0.25 mg or 0.5 mg (2 mg/3 mL) pen injector; Inject 0.5 mg into the skin every 7 days  Dispense: 3 mL; Refill: 1  -     Hemoglobin A1C; Future; Expected date: 09/14/2023  -     Comprehensive Metabolic Panel; Future; Expected date: 09/14/2023  - Improved.  Repeat Ha1c  in Sept.     Hyperlipidemia, unspecified hyperlipidemia type  - Pt declines statin. We discussed Risk factors for CV events.     Hypertension associated with diabetes  - Stable.  Cont current regimen.     Chronic obstructive pulmonary disease, unspecified COPD type  - Stable.  Cont current regimen.     JANAE on CPAP  - Stable.  Cont current regimen.    Multiple pulmonary nodules  - Stable.  Cont current regimen.        Chronic conditions status updated as per HPI.  Other than changes above, cont current medications and maintain follow up with specialists.   Follow up in about 3 months (around 10/20/2023).      Erika Mcclure MD  [facility]                Answers submitted by the patient for this visit:  Diabetes Questionnaire (Submitted on 7/19/2023)  Chief Complaint: Diabetes problem  Diabetes type: type 2  MedicAlert ID: No  Disease duration: 8 Years  blurred vision: No  foot paresthesias: No  foot ulcerations: No  visual change: No  weight loss: No  hunger: No  mood changes: No  sleepiness: Yes  sweats: No  blackouts: No  hospitalization: No  nocturnal hypoglycemia: No  required assistance: No  required glucagon: No  CVA: No  heart disease: No  nephropathy: No  peripheral neuropathy: No  PVD: No  retinopathy: No  autonomic neuropathy: No  CAD risks: dyslipidemia, hypertension, obesity, stress  Current treatments: diet, oral agent (dual therapy)  Treatment compliance: all of the time  Home blood tests: 3-4 x per day  Home urines: <1 x per month  Monitoring compliance: good  Blood glucose trend: decreasing steadily  Weight trend: fluctuating minimally  Current diet: generally healthy  Meal planning: avoidance of concentrated sweets  Exercise: every other day  Dietitian visit: No  Eye exam current: Yes  Sees podiatrist: Yes

## 2023-07-19 ENCOUNTER — CLINICAL SUPPORT (OUTPATIENT)
Dept: REHABILITATION | Facility: HOSPITAL | Age: 63
End: 2023-07-19
Payer: MEDICARE

## 2023-07-19 DIAGNOSIS — M53.82 IMPAIRED RANGE OF MOTION OF CERVICAL SPINE: ICD-10-CM

## 2023-07-19 DIAGNOSIS — R29.898 DECREASED STRENGTH OF UPPER EXTREMITY: ICD-10-CM

## 2023-07-19 DIAGNOSIS — R29.3 POSTURE ABNORMALITY: Primary | ICD-10-CM

## 2023-07-19 LAB
LEFT EYE DM RETINOPATHY: POSITIVE
RIGHT EYE DM RETINOPATHY: POSITIVE

## 2023-07-19 PROCEDURE — 97110 THERAPEUTIC EXERCISES: CPT | Mod: HCNC,CQ

## 2023-07-19 PROCEDURE — 97112 NEUROMUSCULAR REEDUCATION: CPT | Mod: HCNC,CQ

## 2023-07-19 PROCEDURE — 97530 THERAPEUTIC ACTIVITIES: CPT | Mod: HCNC,CQ

## 2023-07-19 NOTE — PROGRESS NOTES
"  Physical Therapy Daily Treatment Note     Name: Linn Price  Clinic Number: [id number]  Therapy Diagnosis:        Encounter Diagnoses   Name Primary?    Chronic neck pain      Posture abnormality      Decreased strength of upper extremity      Impaired range of motion of cervical spine        Physician: Erika Mcclure MD     Physician Orders: PT Eval and Treat   Medical Diagnosis from Referral: M54.2,G89.29 (ICD-10-CM) - Chronic neck pain  Evaluation Date: 6/29/2023  Authorization Period Expiration: 12/31/2023  Plan of Care Expiration: 09/20/2023  Progress Note Due: 08/24/2023  Visit # / Visits authorized: 2/ 20  FOTO: given at initial evaluation       Precautions: Standard, Diabetes, blood thinners, and elevated blood glucose      Time In: 1300  Time Out: 1400  Total Appointment Time (timed & untimed codes): 30 minutes  Subjective     Pt reports: severe neck pain when arrived for tx. Stating increased hours of work. Pt requesting to t/f to [facility] due to convenience.   She was NOT COMPLIANT with home exercise program.   Response to previous treatment: no issues mentioned   Functional change: chronic pain     Pain: 4/10  Location: neck  > L     Objective     Linn received therapeutic exercises to develop strength, endurance, ROM, flexibility, posture, and core stabilization for 25 minutes including:  mechanical cervical neck distraction 10'   Prone neck extension 3x10  Seated B cervical rotation w/towel 10x/10"   Standing OTB shld ext 3x10/3"    Linn received the following manual therapy techniques: Joint mobilizations, Manual traction, and Soft tissue Mobilization were applied to the: C/S for 10 minutes, including:  Occipital release, c/s distraction, c/s lateral glides, UT and levator stretch    Linn participated in neuromuscular re-education activities to improve: Balance, Coordination, Kinesthetic, Sense, Proprioception, and Posture for 10'  minutes. The following activities were " included:  Chin tuck with BP cuff  T/C 2x30     Linn participated in dynamic functional therapeutic activities to improve functional performance for 13 minutes, including:  UBE 4'/4' pulling/pushing with moist heat   Jewels shld flexion 5'       Home Exercises Provided and Patient Education Provided     Education provided:   - Compliance with HEP     Written Home Exercises Provided: yes.  Exercises were reviewed and Linn was able to demonstrate them prior to the end of the session.  Linn demonstrated good  understanding of the education provided.     Assessment   Pt tolerating tx well. Min pain relief and tightness noted after manual and mechanical cervical distraction. Chin tucks improved with TC using BP cuff and VC for posture awareness. Instructed on compliance with HEP per Supervising PT.     Linn Is progressing well towards her goals.   Pt prognosis is Good.     Pt will continue to benefit from skilled outpatient physical therapy to address the deficits listed in the problem list box on initial evaluation, provide pt/family education and to maximize pt's level of independence in the home and community environment.     Pt's spiritual, cultural and educational needs considered and pt agreeable to plan of care and goals.    Anticipated barriers to physical therapy: none     Goals: Goals:  Short Term Goals: 4-6 weeks  1. Pt will be compliant with HEP 50% of prescribed amount.   2. The pt to demo improvement in pain free cervical left rotation by 10% of full ROM.  3.  Report decreased neck pain  <   / =  2 /10  to increase tolerance for adls, work.  4. Pt will demo improvement in midline cervical alignment in sitting to improve posture.      Long Term Goals: 8-10 weeks   Pt will be compliant with % of prescribed amount.   Patient will improve their FOTO limitation score to at least 36% as evidence of clinically significant improvements in their function.  Pt will demo improvement in 20% of cervical  left rotation to equal R cervical rotation.  The pt will report full participation in ADLs and IADLs without restrictions related to neck pain.         Plan     Continue with POC     Anderson Harris, PTA, STS

## 2023-07-20 ENCOUNTER — OFFICE VISIT (OUTPATIENT)
Dept: PRIMARY CARE CLINIC | Facility: CLINIC | Age: 63
End: 2023-07-20
Payer: MEDICARE

## 2023-07-20 VITALS — BODY MASS INDEX: 32.87 KG/M2 | WEIGHT: 197.5 LBS

## 2023-07-20 DIAGNOSIS — E11.9 TYPE 2 DIABETES MELLITUS WITHOUT COMPLICATION, WITHOUT LONG-TERM CURRENT USE OF INSULIN: Primary | ICD-10-CM

## 2023-07-20 DIAGNOSIS — E11.59 HYPERTENSION ASSOCIATED WITH DIABETES: ICD-10-CM

## 2023-07-20 DIAGNOSIS — G47.33 OSA ON CPAP: ICD-10-CM

## 2023-07-20 DIAGNOSIS — J44.9 CHRONIC OBSTRUCTIVE PULMONARY DISEASE, UNSPECIFIED COPD TYPE: ICD-10-CM

## 2023-07-20 DIAGNOSIS — E78.5 HYPERLIPIDEMIA, UNSPECIFIED HYPERLIPIDEMIA TYPE: ICD-10-CM

## 2023-07-20 DIAGNOSIS — R91.8 MULTIPLE PULMONARY NODULES: ICD-10-CM

## 2023-07-20 DIAGNOSIS — I15.2 HYPERTENSION ASSOCIATED WITH DIABETES: ICD-10-CM

## 2023-07-20 PROCEDURE — 1160F RVW MEDS BY RX/DR IN RCRD: CPT | Mod: HCNC,CPTII,95, | Performed by: INTERNAL MEDICINE

## 2023-07-20 PROCEDURE — 3008F BODY MASS INDEX DOCD: CPT | Mod: HCNC,CPTII,95, | Performed by: INTERNAL MEDICINE

## 2023-07-20 PROCEDURE — 3061F PR NEG MICROALBUMINURIA RESULT DOCUMENTED/REVIEW: ICD-10-PCS | Mod: HCNC,CPTII,95, | Performed by: INTERNAL MEDICINE

## 2023-07-20 PROCEDURE — 1160F PR REVIEW ALL MEDS BY PRESCRIBER/CLIN PHARMACIST DOCUMENTED: ICD-10-PCS | Mod: HCNC,CPTII,95, | Performed by: INTERNAL MEDICINE

## 2023-07-20 PROCEDURE — 99214 PR OFFICE/OUTPT VISIT, EST, LEVL IV, 30-39 MIN: ICD-10-PCS | Mod: HCNC,95,, | Performed by: INTERNAL MEDICINE

## 2023-07-20 PROCEDURE — 3046F PR MOST RECENT HEMOGLOBIN A1C LEVEL > 9.0%: ICD-10-PCS | Mod: HCNC,CPTII,95, | Performed by: INTERNAL MEDICINE

## 2023-07-20 PROCEDURE — 3061F NEG MICROALBUMINURIA REV: CPT | Mod: HCNC,CPTII,95, | Performed by: INTERNAL MEDICINE

## 2023-07-20 PROCEDURE — 4010F ACE/ARB THERAPY RXD/TAKEN: CPT | Mod: HCNC,CPTII,95, | Performed by: INTERNAL MEDICINE

## 2023-07-20 PROCEDURE — 4010F PR ACE/ARB THEARPY RXD/TAKEN: ICD-10-PCS | Mod: HCNC,CPTII,95, | Performed by: INTERNAL MEDICINE

## 2023-07-20 PROCEDURE — 3046F HEMOGLOBIN A1C LEVEL >9.0%: CPT | Mod: HCNC,CPTII,95, | Performed by: INTERNAL MEDICINE

## 2023-07-20 PROCEDURE — 1159F PR MEDICATION LIST DOCUMENTED IN MEDICAL RECORD: ICD-10-PCS | Mod: HCNC,CPTII,95, | Performed by: INTERNAL MEDICINE

## 2023-07-20 PROCEDURE — 1159F MED LIST DOCD IN RCRD: CPT | Mod: HCNC,CPTII,95, | Performed by: INTERNAL MEDICINE

## 2023-07-20 PROCEDURE — 3066F NEPHROPATHY DOC TX: CPT | Mod: HCNC,CPTII,95, | Performed by: INTERNAL MEDICINE

## 2023-07-20 PROCEDURE — 3066F PR DOCUMENTATION OF TREATMENT FOR NEPHROPATHY: ICD-10-PCS | Mod: HCNC,CPTII,95, | Performed by: INTERNAL MEDICINE

## 2023-07-20 PROCEDURE — 3008F PR BODY MASS INDEX (BMI) DOCUMENTED: ICD-10-PCS | Mod: HCNC,CPTII,95, | Performed by: INTERNAL MEDICINE

## 2023-07-20 PROCEDURE — 99214 OFFICE O/P EST MOD 30 MIN: CPT | Mod: HCNC,95,, | Performed by: INTERNAL MEDICINE

## 2023-07-20 RX ORDER — SEMAGLUTIDE 0.68 MG/ML
INJECTION, SOLUTION SUBCUTANEOUS
Qty: 3 ML | Refills: 1 | Status: SHIPPED | OUTPATIENT
Start: 2023-07-20 | End: 2023-10-24 | Stop reason: SDUPTHER

## 2023-07-21 ENCOUNTER — PATIENT MESSAGE (OUTPATIENT)
Dept: PRIMARY CARE CLINIC | Facility: CLINIC | Age: 63
End: 2023-07-21
Payer: MEDICARE

## 2023-07-24 ENCOUNTER — CLINICAL SUPPORT (OUTPATIENT)
Dept: REHABILITATION | Facility: HOSPITAL | Age: 63
End: 2023-07-24
Payer: MEDICARE

## 2023-07-24 DIAGNOSIS — R29.898 DECREASED STRENGTH OF UPPER EXTREMITY: ICD-10-CM

## 2023-07-24 DIAGNOSIS — M53.82 IMPAIRED RANGE OF MOTION OF CERVICAL SPINE: ICD-10-CM

## 2023-07-24 DIAGNOSIS — R29.3 POSTURE ABNORMALITY: Primary | ICD-10-CM

## 2023-07-24 PROCEDURE — 97530 THERAPEUTIC ACTIVITIES: CPT

## 2023-07-24 PROCEDURE — 97110 THERAPEUTIC EXERCISES: CPT

## 2023-07-24 PROCEDURE — 97140 MANUAL THERAPY 1/> REGIONS: CPT

## 2023-07-24 NOTE — PROGRESS NOTES
"  Physical Therapy Daily Treatment Note     Name: Linn Price  Clinic Number: [id number]  Therapy Diagnosis:        Encounter Diagnoses   Name Primary?    Chronic neck pain      Posture abnormality      Decreased strength of upper extremity      Impaired range of motion of cervical spine        Physician: Erika Mcclure MD     Physician Orders: PT Eval and Treat   Medical Diagnosis from Referral: M54.2,G89.29 (ICD-10-CM) - Chronic neck pain  Evaluation Date: 6/29/2023  Authorization Period Expiration: 12/31/2023  Plan of Care Expiration: 09/20/2023  Progress Note Due: 08/24/2023  Visit # / Visits authorized: 2/ 20  FOTO: given at initial evaluation       Precautions: Standard, Diabetes, blood thinners, and elevated blood glucose      Time In: 03:30  Time Out: 4:15  Total Appointment Time (timed & untimed codes): 45 minutes  Subjective     Pt reports: moderate neck pain when arrived for tx. Stating increased hours of work. Pt first follow up to [facility] due to convenience.   She was partially COMPLIANT with home exercise program.   Response to previous treatment: no issues mentioned   Functional change: chronic pain     Pain: 4/10  Location: neck  > L     Objective     Linn received therapeutic exercises to develop strength, endurance, ROM, flexibility, posture, and core stabilization for 25 minutes including:  mechanical cervical neck distraction 10'   Prone neck extension 3x10  Seated B cervical rotation w/towel 10x/10"   Standing OTB shld ext 3x10/3"    Linn received the following manual therapy techniques: Joint mobilizations, Manual traction, and Soft tissue Mobilization were applied to the: C/S for 10 minutes, including:  Occipital release, c/s distraction, c/s lateral glides, UT and levator stretch    Linn participated in neuromuscular re-education activities to improve: Balance, Coordination, Kinesthetic, Sense, Proprioception, and Posture for 0  minutes. The following activities were " included:  Chin tuck w/  T/C 2x30     Linn participated in dynamic functional therapeutic activities to improve functional performance for 10 minutes, including:  UBE 4'/4' pulling/pushing with moist heat   Jewels shld flexion 5'       Home Exercises Provided and Patient Education Provided     Education provided:   - Compliance with HEP     Written Home Exercises Provided: yes.  Exercises were reviewed and Linn was able to demonstrate them prior to the end of the session.  Linn demonstrated good  understanding of the education provided.     Assessment   Pt tolerating tx well. Moderate relief and tightness noted after manual and mechanical cervical distraction. Chin tucks improved with TC and VC for posture awareness. Instructed on compliance with HEP per Supervising PT.     Linn Is progressing well towards her goals.   Pt prognosis is Good.     Pt will continue to benefit from skilled outpatient physical therapy to address the deficits listed in the problem list box on initial evaluation, provide pt/family education and to maximize pt's level of independence in the home and community environment.     Pt's spiritual, cultural and educational needs considered and pt agreeable to plan of care and goals.    Anticipated barriers to physical therapy: none     Goals: Goals:  Short Term Goals: 4-6 weeks  1. Pt will be compliant with HEP 50% of prescribed amount.   2. The pt to demo improvement in pain free cervical left rotation by 10% of full ROM.  3.  Report decreased neck pain  <   / =  2 /10  to increase tolerance for adls, work.  4. Pt will demo improvement in midline cervical alignment in sitting to improve posture.      Long Term Goals: 8-10 weeks   Pt will be compliant with % of prescribed amount.   Patient will improve their FOTO limitation score to at least 36% as evidence of clinically significant improvements in their function.  Pt will demo improvement in 20% of cervical left rotation to equal R  cervical rotation.  The pt will report full participation in ADLs and IADLs without restrictions related to neck pain.         Plan     Continue with POC     Rogelio Duque, PT, STS

## 2023-07-26 ENCOUNTER — CLINICAL SUPPORT (OUTPATIENT)
Dept: REHABILITATION | Facility: HOSPITAL | Age: 63
End: 2023-07-26
Payer: MEDICARE

## 2023-07-26 DIAGNOSIS — M53.82 IMPAIRED RANGE OF MOTION OF CERVICAL SPINE: ICD-10-CM

## 2023-07-26 DIAGNOSIS — R29.898 DECREASED STRENGTH OF UPPER EXTREMITY: ICD-10-CM

## 2023-07-26 DIAGNOSIS — R29.3 POSTURE ABNORMALITY: Primary | ICD-10-CM

## 2023-07-26 PROCEDURE — 97140 MANUAL THERAPY 1/> REGIONS: CPT

## 2023-07-26 PROCEDURE — 97110 THERAPEUTIC EXERCISES: CPT

## 2023-07-26 NOTE — PROGRESS NOTES
"  Physical Therapy Daily Treatment Note     Name: Linn Price  Clinic Number: [id number]  Therapy Diagnosis:        Encounter Diagnoses   Name Primary?    Chronic neck pain      Posture abnormality      Decreased strength of upper extremity      Impaired range of motion of cervical spine        Physician: Erika Mcclure MD     Physician Orders: PT Eval and Treat   Medical Diagnosis from Referral: M54.2,G89.29 (ICD-10-CM) - Chronic neck pain  Evaluation Date: 6/29/2023  Authorization Period Expiration: 12/31/2023  Plan of Care Expiration: 09/20/2023  Progress Note Due: 08/24/2023  Visit # / Visits authorized: 4/ 20  FOTO: 1/3      Precautions: Standard, Diabetes, blood thinners, and elevated blood glucose      Time In: 9:30 AM  Time Out: 10:15 AM  Total Appointment Time (timed & untimed codes): 45 minutes    Subjective   Pt reports: she felt more flexible after manual tx last session, but feeling a little sore.     She was  with home exercise program.   Response to previous treatment: decreased stiffness  Functional change: none yet    Pain: 5/10  Location: neck L > R     Objective     Linn received therapeutic exercises to develop strength, endurance, ROM, flexibility, posture, and core stabilization for 20 minutes including:    Supine chin tuck 3 sec hold x20  Seated B cervical rotation w/towel 5 sec hold x10 ea  Seated cervical extension w/ towel 3 sec hold x10  B ER + cervical rotation RTB x15 each side  Standing OTB shld ext 3x10/3" - NP    Linn received the following manual therapy techniques: to the: C/S for 25 minutes, including:  STM Left UT, levator scap, cervical paraspinals for 10 min  Manual cervical distraction + subocciptial release  Cervical upglides & lateral glides bilateral grade II-III  Manual UT stretch     Linn participated in neuromuscular re-education activities to improve: Balance, Coordination, Kinesthetic, Sense, Proprioception, and Posture for 00 minutes. The following " activities were included:      Linn participated in dynamic functional therapeutic activities to improve functional performance for 00 minutes, including:  UBE 4'/4' pulling/pushing  - not today   Jewels shld flexion 5'       Home Exercises Provided and Patient Education Provided     Education provided:   - Compliance with HEP     Written Home Exercises Provided: yes.  Exercises were reviewed and Linn was able to demonstrate them prior to the end of the session.  Linn demonstrated good  understanding of the education provided.     Assessment   Pt continues to demo TTP to Left cervical paraspinals, UT, and levator scap. She responded well to manual tx with decreased tenderness and pain reported following. She tolerated therex well with min verbal cues needed for correct technique. Encouraged her to continue with HEP daily.       Pt prognosis is Good.   Pt will continue to benefit from skilled outpatient physical therapy to address the deficits listed in the problem list box on initial evaluation, provide pt/family education and to maximize pt's level of independence in the home and community environment.     Pt's spiritual, cultural and educational needs considered and pt agreeable to plan of care and goals.    Anticipated barriers to physical therapy: none     Goals: Goals:  Short Term Goals: 4-6 weeks  1. Pt will be compliant with HEP 50% of prescribed amount.   2. The pt to demo improvement in pain free cervical left rotation by 10% of full ROM.  3.  Report decreased neck pain  <   / =  2 /10  to increase tolerance for adls, work.  4. Pt will demo improvement in midline cervical alignment in sitting to improve posture.      Long Term Goals: 8-10 weeks   Pt will be compliant with % of prescribed amount.   Patient will improve their FOTO limitation score to at least 36% as evidence of clinically significant improvements in their function.  Pt will demo improvement in 20% of cervical left rotation to  equal R cervical rotation.  The pt will report full participation in ADLs and IADLs without restrictions related to neck pain.         Plan     Continue per established POC     Shanti Mora, PT

## 2023-07-31 DIAGNOSIS — Z78.0 MENOPAUSE: Primary | ICD-10-CM

## 2023-08-02 ENCOUNTER — CLINICAL SUPPORT (OUTPATIENT)
Dept: REHABILITATION | Facility: HOSPITAL | Age: 63
End: 2023-08-02
Payer: MEDICARE

## 2023-08-02 DIAGNOSIS — R29.3 POSTURE ABNORMALITY: Primary | ICD-10-CM

## 2023-08-02 DIAGNOSIS — M53.82 IMPAIRED RANGE OF MOTION OF CERVICAL SPINE: ICD-10-CM

## 2023-08-02 DIAGNOSIS — R29.898 DECREASED STRENGTH OF UPPER EXTREMITY: ICD-10-CM

## 2023-08-02 PROCEDURE — 97140 MANUAL THERAPY 1/> REGIONS: CPT

## 2023-08-02 PROCEDURE — 97110 THERAPEUTIC EXERCISES: CPT

## 2023-08-02 NOTE — PROGRESS NOTES
"  Physical Therapy Daily Treatment Note     Name: Linn Price  Clinic Number: [id number]  Therapy Diagnosis:        Encounter Diagnoses   Name Primary?    Chronic neck pain      Posture abnormality      Decreased strength of upper extremity      Impaired range of motion of cervical spine        Physician: Erika Mcclure MD     Physician Orders: PT Eval and Treat   Medical Diagnosis from Referral: M54.2,G89.29 (ICD-10-CM) - Chronic neck pain  Evaluation Date: 6/29/2023  Authorization Period Expiration: 12/31/2023  Plan of Care Expiration: 09/20/2023  Progress Note Due: 08/24/2023  Visit # / Visits authorized: 4/ 20  FOTO: 1/3      Precautions: Standard, Diabetes, blood thinners, and elevated blood glucose      Time In: 9:35 AM  Time Out: 10:15 AM  Total Appointment Time (timed & untimed codes): 45 minutes    Subjective   Pt reports: she felt more flexible after manual tx last session, but feeling a little sore.     She was  with home exercise program.   Response to previous treatment: decreased stiffness  Functional change: none yet    Pain: 5/10  Location: neck L > R     Objective     Linn received therapeutic exercises to develop strength, endurance, ROM, flexibility, posture, and core stabilization for 23 minutes including:    Seated chin tuck 3 sec hold x20  Seated B cervical rotation w/towel 5 sec hold 2x10 ea  Seated cervical extension w/ towel 3 sec hold2 x10  B ER + cervical rotation RTB x15 each side  Standing RTB shld ext 3x10/3"     Linn received the following manual therapy techniques: to the: C/S for 20 minutes, including:  STM Left UT, levator scap, cervical paraspinals for 10 min  Manual cervical distraction + subocciptial release  Cervical upglides & lateral glides bilateral grade II-III  Manual UT stretch     Linn participated in neuromuscular re-education activities to improve: Balance, Coordination, Kinesthetic, Sense, Proprioception, and Posture for 00 minutes. The following " activities were included:      Linn participated in dynamic functional therapeutic activities to improve functional performance for 00 minutes, including:  UBE 4'/4' pulling/pushing  - not today   Jewels shld flexion 5'       Home Exercises Provided and Patient Education Provided     Education provided:   - Compliance with HEP     Written Home Exercises Provided: yes.  Exercises were reviewed and Linn was able to demonstrate them prior to the end of the session.  Linn demonstrated good  understanding of the education provided.     Assessment     Pt continues to present with mod FHP and challenges with maintaining upright cervical posture. Rotational joint mobilizations continue to be the most challenging but she responded well to mulligan mobilizations today focused on cervical rotation. Re-instructed for home performance      Pt prognosis is Good.   Pt will continue to benefit from skilled outpatient physical therapy to address the deficits listed in the problem list box on initial evaluation, provide pt/family education and to maximize pt's level of independence in the home and community environment.     Pt's spiritual, cultural and educational needs considered and pt agreeable to plan of care and goals.    Anticipated barriers to physical therapy: none     Goals: Goals:  Short Term Goals: 4-6 weeks  1. Pt will be compliant with HEP 50% of prescribed amount.   2. The pt to demo improvement in pain free cervical left rotation by 10% of full ROM.  3.  Report decreased neck pain  <   / =  2 /10  to increase tolerance for adls, work.  4. Pt will demo improvement in midline cervical alignment in sitting to improve posture.      Long Term Goals: 8-10 weeks   Pt will be compliant with % of prescribed amount.   Patient will improve their FOTO limitation score to at least 36% as evidence of clinically significant improvements in their function.  Pt will demo improvement in 20% of cervical left rotation to  equal R cervical rotation.  The pt will report full participation in ADLs and IADLs without restrictions related to neck pain.         Plan     Continue per established POC     Marcella Wilcox, PT

## 2023-08-04 ENCOUNTER — CLINICAL SUPPORT (OUTPATIENT)
Dept: REHABILITATION | Facility: HOSPITAL | Age: 63
End: 2023-08-04
Payer: MEDICARE

## 2023-08-04 DIAGNOSIS — R29.898 DECREASED STRENGTH OF UPPER EXTREMITY: ICD-10-CM

## 2023-08-04 DIAGNOSIS — M53.82 IMPAIRED RANGE OF MOTION OF CERVICAL SPINE: ICD-10-CM

## 2023-08-04 DIAGNOSIS — R29.3 POSTURE ABNORMALITY: Primary | ICD-10-CM

## 2023-08-04 PROCEDURE — 97110 THERAPEUTIC EXERCISES: CPT

## 2023-08-04 PROCEDURE — 97140 MANUAL THERAPY 1/> REGIONS: CPT

## 2023-08-07 NOTE — PROGRESS NOTES
"  Physical Therapy Daily Treatment Note     Name: Linn Price  Clinic Number: [id number]  Therapy Diagnosis:        Encounter Diagnoses   Name Primary?    Chronic neck pain      Posture abnormality      Decreased strength of upper extremity      Impaired range of motion of cervical spine        Physician: Erika Mcclure MD     Physician Orders: PT Eval and Treat   Medical Diagnosis from Referral: M54.2,G89.29 (ICD-10-CM) - Chronic neck pain  Evaluation Date: 6/29/2023  Authorization Period Expiration: 12/31/2023  Plan of Care Expiration: 09/20/2023  Progress Note Due: 08/24/2023  Visit # / Visits authorized: 4/ 20  FOTO: 1/3      Precautions: Standard, Diabetes, blood thinners, and elevated blood glucose      Time In: 10:45 AM  Time Out: 11:20 AM  Total Appointment Time (timed & untimed codes): 35 minutes    Subjective   Pt reports: she felt more flexible after manual tx last session, but feeling a little sore. Pt states she woke up with allergic reaction on R side of her face and prefers a shorter session todat    She was  with home exercise program.   Response to previous treatment: decreased stiffness  Functional change: none yet    Pain: 3/10  Location: neck L > R     Objective     Linn received therapeutic exercises to develop strength, endurance, ROM, flexibility, posture, and core stabilization for 8 minutes including:    Seated chin tuck 3 sec hold x20  Seated B cervical rotation w/towel 5 sec hold 2x10 ea  Seated cervical extension w/ towel 3 sec hold2 x10  B ER + cervical rotation RTB x15 each side  Standing RTB shld ext 3x10/3"     Linn received the following manual therapy techniques: to the: C/S for 27 minutes, including:  STM Left UT, levator scap, cervical paraspinals for 10 min  Manual cervical distraction + subocciptial release  Cervical upglides & lateral glides bilateral grade II-III  Manual UT stretch     Linn participated in neuromuscular re-education activities to improve: " Balance, Coordination, Kinesthetic, Sense, Proprioception, and Posture for 00 minutes. The following activities were included:      Linn participated in dynamic functional therapeutic activities to improve functional performance for 00 minutes, including:  UBE 4'/4' pulling/pushing  - not today   Jewels shld flexion 5'       Home Exercises Provided and Patient Education Provided     Education provided:   - Compliance with HEP     Written Home Exercises Provided: yes.  Exercises were reviewed and Linn was able to demonstrate them prior to the end of the session.  Linn demonstrated good  understanding of the education provided.     Assessment     Pt continues to present with mod FHP and challenges with maintaining upright cervical posture. Rotational joint mobilizations continue to be the most challenging. Pt responds well to manual therapy prior to therex. Cont to progress POC as tolerated      Pt prognosis is Good.   Pt will continue to benefit from skilled outpatient physical therapy to address the deficits listed in the problem list box on initial evaluation, provide pt/family education and to maximize pt's level of independence in the home and community environment.     Pt's spiritual, cultural and educational needs considered and pt agreeable to plan of care and goals.    Anticipated barriers to physical therapy: none     Goals: Goals:  Short Term Goals: 4-6 weeks  1. Pt will be compliant with HEP 50% of prescribed amount.   2. The pt to demo improvement in pain free cervical left rotation by 10% of full ROM.  3.  Report decreased neck pain  <   / =  2 /10  to increase tolerance for adls, work.  4. Pt will demo improvement in midline cervical alignment in sitting to improve posture.      Long Term Goals: 8-10 weeks   Pt will be compliant with % of prescribed amount.   Patient will improve their FOTO limitation score to at least 36% as evidence of clinically significant improvements in their  function.  Pt will demo improvement in 20% of cervical left rotation to equal R cervical rotation.  The pt will report full participation in ADLs and IADLs without restrictions related to neck pain.         Plan     Continue per established POC     Rogelio Duque, PT

## 2023-08-09 ENCOUNTER — CLINICAL SUPPORT (OUTPATIENT)
Dept: REHABILITATION | Facility: HOSPITAL | Age: 63
End: 2023-08-09
Payer: MEDICARE

## 2023-08-09 DIAGNOSIS — R29.898 DECREASED STRENGTH OF UPPER EXTREMITY: ICD-10-CM

## 2023-08-09 DIAGNOSIS — M53.82 IMPAIRED RANGE OF MOTION OF CERVICAL SPINE: ICD-10-CM

## 2023-08-09 DIAGNOSIS — R29.3 POSTURE ABNORMALITY: Primary | ICD-10-CM

## 2023-08-09 PROCEDURE — 97140 MANUAL THERAPY 1/> REGIONS: CPT

## 2023-08-09 PROCEDURE — 97110 THERAPEUTIC EXERCISES: CPT

## 2023-08-09 NOTE — PROGRESS NOTES
"  Physical Therapy Daily Treatment Note     Name: Linn Price  Clinic Number: [id number]  Therapy Diagnosis:        Encounter Diagnoses   Name Primary?    Chronic neck pain      Posture abnormality      Decreased strength of upper extremity      Impaired range of motion of cervical spine        Physician: Erika Mcclure MD     Physician Orders: PT Eval and Treat   Medical Diagnosis from Referral: M54.2,G89.29 (ICD-10-CM) - Chronic neck pain  Evaluation Date: 6/29/2023  Authorization Period Expiration: 12/31/2023  Plan of Care Expiration: 09/20/2023  Progress Note Due: 08/24/2023  Visit # / Visits authorized: 4/ 20  FOTO: 1/3      Precautions: Standard, Diabetes, blood thinners, and elevated blood glucose      Time In: 1:25 pm  Time Out: 155 PM  Total Appointment Time (timed & untimed codes): 3 minutes    Subjective   Pt reports: doing ok, feels like the hands on is helping her gain some mobility    She was  with home exercise program.   Response to previous treatment: decreased stiffness  Functional change: none yet    Pain: 3/10  Location: neck L > R     Objective     Linn received therapeutic exercises to develop strength, endurance, ROM, flexibility, posture, and core stabilization for 10 minutes including:    Seated chin tuck 3 sec hold x20  Seated B cervical rotation w/towel 5 sec hold 2x10 ea  Seated cervical extension w/ towel 3 sec hold2 x10  B ER + cervical rotation RTB x15 each side  Serratus wall slides with arms in pillowcase X 20  Seated horizontal abduction  Standing RTB shld ext 3x10/3"     Linn received the following manual therapy techniques: to the: C/S for 15 minutes, including:  STM Left UT, levator scap, cervical paraspinals for 8 min  Manual cervical distraction + subocciptial release  Seated MWM into R & L rotational glides with UPA grade 2-3  Seated UPA C5-T2    Cervical upglides & lateral glides bilateral grade II-III  Manual UT stretch     Linn participated in neuromuscular " re-education activities to improve: Balance, Coordination, Kinesthetic, Sense, Proprioception, and Posture for 00 minutes. The following activities were included:      Linn participated in dynamic functional therapeutic activities to improve functional performance for 00 minutes, including:  UBE 4'/4' pulling/pushing  - not today   Jewels shld flexion 5'       Home Exercises Provided and Patient Education Provided     Education provided:   - Compliance with HEP     Written Home Exercises Provided: yes.  Exercises were reviewed and Linn was able to demonstrate them prior to the end of the session.  Linn demonstrated good  understanding of the education provided.     Assessment     Pt limited on time today but responded well to session focusing on MWM in the cervical spine performed in a seated position with improvement in range of motion afterwards. She continues to have tightness throughout the cervical spine and remains mod limited but she is responding well to strengthening and joint mobilizations      Pt prognosis is Good.   Pt will continue to benefit from skilled outpatient physical therapy to address the deficits listed in the problem list box on initial evaluation, provide pt/family education and to maximize pt's level of independence in the home and community environment.     Pt's spiritual, cultural and educational needs considered and pt agreeable to plan of care and goals.    Anticipated barriers to physical therapy: none     Goals: Goals:  Short Term Goals: 4-6 weeks  1. Pt will be compliant with HEP 50% of prescribed amount.   2. The pt to demo improvement in pain free cervical left rotation by 10% of full ROM.  3.  Report decreased neck pain  <   / =  2 /10  to increase tolerance for adls, work.  4. Pt will demo improvement in midline cervical alignment in sitting to improve posture.      Long Term Goals: 8-10 weeks   Pt will be compliant with % of prescribed amount.   Patient will improve  their FOTO limitation score to at least 36% as evidence of clinically significant improvements in their function.  Pt will demo improvement in 20% of cervical left rotation to equal R cervical rotation.  The pt will report full participation in ADLs and IADLs without restrictions related to neck pain.         Plan     Continue per established POC     Marcella Wilcox, PT

## 2023-08-15 ENCOUNTER — OFFICE VISIT (OUTPATIENT)
Dept: DERMATOLOGY | Facility: CLINIC | Age: 63
End: 2023-08-15
Payer: MEDICARE

## 2023-08-15 ENCOUNTER — HOSPITAL ENCOUNTER (OUTPATIENT)
Dept: RADIOLOGY | Facility: HOSPITAL | Age: 63
Discharge: HOME | End: 2023-08-15
Attending: OBSTETRICS & GYNECOLOGY
Payer: MEDICARE

## 2023-08-15 VITALS — WEIGHT: 197 LBS | BODY MASS INDEX: 32.78 KG/M2

## 2023-08-15 DIAGNOSIS — L40.9 PSORIASIS: ICD-10-CM

## 2023-08-15 DIAGNOSIS — Z87.2 HISTORY OF URTICARIA: ICD-10-CM

## 2023-08-15 DIAGNOSIS — Z87.2 HISTORY OF PSORIASIS: Primary | ICD-10-CM

## 2023-08-15 DIAGNOSIS — Z78.0 MENOPAUSE: ICD-10-CM

## 2023-08-15 DIAGNOSIS — D22.9 NEVUS OF MULTIPLE SITES: ICD-10-CM

## 2023-08-15 DIAGNOSIS — Z12.83 SKIN CANCER SCREENING: ICD-10-CM

## 2023-08-15 PROCEDURE — 3061F PR NEG MICROALBUMINURIA RESULT DOCUMENTED/REVIEW: ICD-10-PCS | Mod: HCNC,CPTII,S$GLB, | Performed by: DERMATOLOGY

## 2023-08-15 PROCEDURE — 3046F HEMOGLOBIN A1C LEVEL >9.0%: CPT | Mod: HCNC,CPTII,S$GLB, | Performed by: DERMATOLOGY

## 2023-08-15 PROCEDURE — 1160F PR REVIEW ALL MEDS BY PRESCRIBER/CLIN PHARMACIST DOCUMENTED: ICD-10-PCS | Mod: HCNC,CPTII,S$GLB, | Performed by: DERMATOLOGY

## 2023-08-15 PROCEDURE — 77080 DXA BONE DENSITY AXIAL: CPT | Mod: TC

## 2023-08-15 PROCEDURE — 3046F PR MOST RECENT HEMOGLOBIN A1C LEVEL > 9.0%: ICD-10-PCS | Mod: HCNC,CPTII,S$GLB, | Performed by: DERMATOLOGY

## 2023-08-15 PROCEDURE — 4010F ACE/ARB THERAPY RXD/TAKEN: CPT | Mod: HCNC,CPTII,S$GLB, | Performed by: DERMATOLOGY

## 2023-08-15 PROCEDURE — 1160F RVW MEDS BY RX/DR IN RCRD: CPT | Mod: HCNC,CPTII,S$GLB, | Performed by: DERMATOLOGY

## 2023-08-15 PROCEDURE — 99999 PR PBB SHADOW E&M-EST. PATIENT-LVL III: ICD-10-PCS | Mod: PBBFAC,HCNC,, | Performed by: DERMATOLOGY

## 2023-08-15 PROCEDURE — 99213 PR OFFICE/OUTPT VISIT, EST, LEVL III, 20-29 MIN: ICD-10-PCS | Mod: HCNC,S$GLB,, | Performed by: DERMATOLOGY

## 2023-08-15 PROCEDURE — 99213 OFFICE O/P EST LOW 20 MIN: CPT | Mod: HCNC,S$GLB,, | Performed by: DERMATOLOGY

## 2023-08-15 PROCEDURE — 1159F MED LIST DOCD IN RCRD: CPT | Mod: HCNC,CPTII,S$GLB, | Performed by: DERMATOLOGY

## 2023-08-15 PROCEDURE — 1159F PR MEDICATION LIST DOCUMENTED IN MEDICAL RECORD: ICD-10-PCS | Mod: HCNC,CPTII,S$GLB, | Performed by: DERMATOLOGY

## 2023-08-15 PROCEDURE — 99999 PR PBB SHADOW E&M-EST. PATIENT-LVL III: CPT | Mod: PBBFAC,HCNC,, | Performed by: DERMATOLOGY

## 2023-08-15 PROCEDURE — 3008F BODY MASS INDEX DOCD: CPT | Mod: HCNC,CPTII,S$GLB, | Performed by: DERMATOLOGY

## 2023-08-15 PROCEDURE — 4010F PR ACE/ARB THEARPY RXD/TAKEN: ICD-10-PCS | Mod: HCNC,CPTII,S$GLB, | Performed by: DERMATOLOGY

## 2023-08-15 PROCEDURE — 3066F PR DOCUMENTATION OF TREATMENT FOR NEPHROPATHY: ICD-10-PCS | Mod: HCNC,CPTII,S$GLB, | Performed by: DERMATOLOGY

## 2023-08-15 PROCEDURE — 3061F NEG MICROALBUMINURIA REV: CPT | Mod: HCNC,CPTII,S$GLB, | Performed by: DERMATOLOGY

## 2023-08-15 PROCEDURE — 77080 DXA BONE DENSITY AXIAL: CPT | Mod: 26,,, | Performed by: INTERNAL MEDICINE

## 2023-08-15 PROCEDURE — 77080 DXA BONE DENSITY AXIAL SKELETON 1 OR MORE SITES: ICD-10-PCS | Mod: 26,,, | Performed by: INTERNAL MEDICINE

## 2023-08-15 PROCEDURE — 3008F PR BODY MASS INDEX (BMI) DOCUMENTED: ICD-10-PCS | Mod: HCNC,CPTII,S$GLB, | Performed by: DERMATOLOGY

## 2023-08-15 PROCEDURE — 3066F NEPHROPATHY DOC TX: CPT | Mod: HCNC,CPTII,S$GLB, | Performed by: DERMATOLOGY

## 2023-08-15 NOTE — PROGRESS NOTES
Subjective:      Patient ID:  Linn Price is a 63 y.o. female who presents for   Chief Complaint   Patient presents with    Skin Check     Face, swelling      History of psoriasis on palms and soles which has resolved.  Broke out recently with an erythematous rash on her arms and trunk (has photos) and also had swelling of lips.  Went to ED and was given epipen to keep for future use.          Review of Systems   Constitutional:  Negative for fever, chills, weight loss, weight gain, fatigue, night sweats and malaise.   Skin:  Negative for daily sunscreen use, activity-related sunscreen use and wears hat.   Hematologic/Lymphatic: Does not bruise/bleed easily.       Objective:   Physical Exam   Constitutional: She appears well-developed and well-nourished.   Neurological: She is alert and oriented to person, place, and time.   Psychiatric: She has a normal mood and affect.   Skin:               Diagram Legend     Erythematous scaling macule/papule c/w actinic keratosis       Vascular papule c/w angioma      Pigmented verrucoid papule/plaque c/w seborrheic keratosis      Yellow umbilicated papule c/w sebaceous hyperplasia      Irregularly shaped tan macule c/w lentigo     1-2 mm smooth white papules consistent with Milia      Movable subcutaneous cyst with punctum c/w epidermal inclusion cyst      Subcutaneous movable cyst c/w pilar cyst      Firm pink to brown papule c/w dermatofibroma      Pedunculated fleshy papule(s) c/w skin tag(s)      Evenly pigmented macule c/w junctional nevus     Mildly variegated pigmented, slightly irregular-bordered macule c/w mildly atypical nevus      Flesh colored to evenly pigmented papule c/w intradermal nevus       Pink pearly papule/plaque c/w basal cell carcinoma      Erythematous hyperkeratotic cursted plaque c/w SCC      Surgical scar with no sign of skin cancer recurrence      Open and closed comedones      Inflammatory papules and pustules      Verrucoid papule  "consistent consistent with wart     Erythematous eczematous patches and plaques     Dystrophic onycholytic nail with subungual debris c/w onychomycosis     Umbilicated papule    Erythematous-base heme-crusted tan verrucoid plaque consistent with inflamed seborrheic keratosis     Erythematous Silvery Scaling Plaque c/w Psoriasis     See annotation      Assessment / Plan:        History of psoriasis, clear now  See notes Dr Downey.     Psoriasis  -     Ambulatory referral/consult to Dermatology    Skin cancer screening  -     Ambulatory referral/consult to Dermatology    History of urticaria, clear now  May have atopic dermatitis by hx, Brochure provided  Fragrance free products, cetaphil restoraderm lotion, no hot water  Referred to allergy     Nevus of multiple sites  The "ABCD" rules to observe pigmented lesions were reviewed.  Brochure provided               Follow up in about 1 year (around 8/15/2024).  "

## 2023-08-16 ENCOUNTER — OFFICE VISIT (OUTPATIENT)
Dept: URGENT CARE | Facility: CLINIC | Age: 63
End: 2023-08-16
Payer: MEDICARE

## 2023-08-16 VITALS
BODY MASS INDEX: 32.82 KG/M2 | OXYGEN SATURATION: 97 % | TEMPERATURE: 98 F | HEART RATE: 72 BPM | DIASTOLIC BLOOD PRESSURE: 71 MMHG | HEIGHT: 65 IN | SYSTOLIC BLOOD PRESSURE: 126 MMHG | RESPIRATION RATE: 16 BRPM | WEIGHT: 197 LBS

## 2023-08-16 DIAGNOSIS — T78.40XA ALLERGIC REACTION, INITIAL ENCOUNTER: Primary | ICD-10-CM

## 2023-08-16 PROCEDURE — 99202 OFFICE O/P NEW SF 15 MIN: CPT | Mod: S$GLB,,, | Performed by: NURSE PRACTITIONER

## 2023-08-16 PROCEDURE — 99202 PR OFFICE/OUTPT VISIT, NEW, LEVL II, 15-29 MIN: ICD-10-PCS | Mod: S$GLB,,, | Performed by: NURSE PRACTITIONER

## 2023-08-16 RX ORDER — DIPHENHYDRAMINE HCL 50 MG
50 CAPSULE ORAL EVERY 6 HOURS PRN
Qty: 30 CAPSULE | Refills: 0 | Status: SHIPPED | OUTPATIENT
Start: 2023-08-16

## 2023-08-16 RX ORDER — MINERAL OIL
180 ENEMA (ML) RECTAL DAILY
Qty: 10 TABLET | Refills: 0 | Status: SHIPPED | OUTPATIENT
Start: 2023-08-16 | End: 2024-08-15

## 2023-08-16 NOTE — PATIENT INSTRUCTIONS
Instruct on medications  If experience any shortness of breath, swelling of the tongue, scratchy throat go to ER

## 2023-08-16 NOTE — PROGRESS NOTES
"Subjective:      Patient ID: Linn Price is a 63 y.o. female.    Vitals:  height is 5' 5" (1.651 m) and weight is 89.4 kg (197 lb). Her oral temperature is 97.9 °F (36.6 °C). Her blood pressure is 126/71 and her pulse is 72. Her respiration is 16 and oxygen saturation is 97%.     Chief Complaint: Allergic Reaction    Allergic Reaction  This is a new problem. The current episode started today. The problem is unchanged. It is unknown (ate crabs last night but has never had a reaction to shellfish in the past) what she was exposed to. Associated symptoms include drooling. Pertinent negatives include no abdominal pain, chest pain, chest pressure, coughing, diarrhea, difficulty breathing, eye itching, eye redness, eye watering, globus sensation, hyperventilation, itching, rash (to the bilateral lower arms), skin blistering, stridor, trouble swallowing, vomiting or wheezing. (Positive for: facial and lip swelling) Past treatments include nothing. Her past medical history is significant for food allergies and medication allergies. Atopic dermatitis: in question.Swelling is present on the face and lips.       HENT:  Positive for drooling. Negative for trouble swallowing.    Cardiovascular:  Negative for chest pain.   Eyes:  Negative for eye itching and eye redness.   Respiratory:  Negative for cough, stridor and wheezing.    Gastrointestinal:  Negative for abdominal pain, vomiting and diarrhea.   Skin:  Negative for rash (to the bilateral lower arms).   Allergic/Immunologic: Positive for food allergies.      Objective:     Physical Exam   HENT:   Head: Normocephalic.   Ears:   Right Ear: Tympanic membrane and ear canal normal.   Nose: Nose normal.   Mouth/Throat: No uvula swelling.      Comments: + swelling to lips with mild erythema  + mild L facial swelling, no redness  Neck: Neck supple.   Pulmonary/Chest: Effort normal and breath sounds normal. No respiratory distress. She has no wheezes.   Abdominal: Normal " appearance.   Neurological: She is alert.   Skin: Skin is warm and dry.   Psychiatric: Her behavior is normal.       Assessment:     1. Allergic reaction, initial encounter        Plan:   Ms.     Allergic reaction, initial encounter  -     diphenhydrAMINE (BENADRYL) 50 MG capsule; Take 1 capsule (50 mg total) by mouth every 6 (six) hours as needed for Itching or Allergies.  Dispense: 30 capsule; Refill: 0  -     fexofenadine (ALLEGRA) 180 MG tablet; Take 1 tablet (180 mg total) by mouth once daily.  Dispense: 10 tablet; Refill: 0      Patient Instructions   Instruct on medications  If experience any shortness of breath, swelling of the tongue, scratchy throat go to ER

## 2023-08-17 ENCOUNTER — TELEPHONE (OUTPATIENT)
Dept: ALLERGY | Facility: CLINIC | Age: 63
End: 2023-08-17
Payer: MEDICARE

## 2023-08-17 ENCOUNTER — PATIENT MESSAGE (OUTPATIENT)
Dept: PRIMARY CARE CLINIC | Facility: CLINIC | Age: 63
End: 2023-08-17
Payer: MEDICARE

## 2023-08-17 DIAGNOSIS — Z91.018 FOOD ALLERGY: Primary | ICD-10-CM

## 2023-08-17 DIAGNOSIS — T78.3XXS ANGIOEDEMA, SEQUELA: ICD-10-CM

## 2023-08-17 NOTE — TELEPHONE ENCOUNTER
Called pt to assist with below message. An appt has been scheduled for 9/29.      Has history of urticaria with lip swelling

## 2023-08-17 NOTE — TELEPHONE ENCOUNTER
----- Message from Sarah Young MD sent at 8/15/2023 11:42 AM CDT -----  Regarding: hives  Has history of urticaria with lip swelling, needs a consult.  Thank you

## 2023-08-18 ENCOUNTER — CLINICAL SUPPORT (OUTPATIENT)
Dept: REHABILITATION | Facility: HOSPITAL | Age: 63
End: 2023-08-18
Payer: MEDICARE

## 2023-08-18 DIAGNOSIS — R29.898 DECREASED STRENGTH OF UPPER EXTREMITY: ICD-10-CM

## 2023-08-18 DIAGNOSIS — M53.82 IMPAIRED RANGE OF MOTION OF CERVICAL SPINE: ICD-10-CM

## 2023-08-18 DIAGNOSIS — R29.3 POSTURE ABNORMALITY: Primary | ICD-10-CM

## 2023-08-18 PROCEDURE — 97140 MANUAL THERAPY 1/> REGIONS: CPT

## 2023-08-18 PROCEDURE — 97110 THERAPEUTIC EXERCISES: CPT

## 2023-08-18 NOTE — TELEPHONE ENCOUNTER
Thanks for the update and the pic.  I have placed the referral. Hope you are feeling better and that we figure out what is causing this soon.  Dr. YEAGER

## 2023-08-18 NOTE — PROGRESS NOTES
"  Physical Therapy Daily Treatment Note     Name: Linn Price  Clinic Number: [id number]  Therapy Diagnosis:        Encounter Diagnoses   Name Primary?    Chronic neck pain      Posture abnormality      Decreased strength of upper extremity      Impaired range of motion of cervical spine        Physician: Erika Mcclure MD     Physician Orders: PT Eval and Treat   Medical Diagnosis from Referral: M54.2,G89.29 (ICD-10-CM) - Chronic neck pain  Evaluation Date: 6/29/2023  Authorization Period Expiration: 12/31/2023  Plan of Care Expiration: 09/20/2023  Progress Note Due: 08/24/2023  Visit # / Visits authorized: 8/ 20  FOTO: 1/3      Precautions: Standard, Diabetes, blood thinners, and elevated blood glucose      Time In: 1:00 pm  Time Out: 1:40 PM  Total Appointment Time (timed & untimed codes): 40 minutes    Subjective   Pt reports: doing ok, Patient reports no pain in her neck and just stiffness. Patient unable to look over her left shoulder.    She was  with home exercise program.   Response to previous treatment: decreased stiffness  Functional change: none yet    Pain: 0/10  Location: neck L > R     Objective     Linn received therapeutic exercises to develop strength, endurance, ROM, flexibility, posture, and core stabilization for 25 minutes including:    Upper trap stretch with upper extremity support and overpressure 3x30s both sides  Levator scapulae stretch with upper extremity support and overpressure 3x30s both sides  Prone on elbows chin tuck 20x 3 sec hold  B ER + cervical rotation at wall RTB x15 each side  Seated chin tuck 3 sec hold x20    NT:  Seated B cervical rotation w/towel 5 sec hold 2x10 ea  Seated cervical extension w/ towel 3 sec hold2 x10  Serratus wall slides with arms in pillowcase X 20  Seated horizontal abduction  Standing RTB shld ext 3x10/3"     Linn received the following manual therapy techniques: to the: C/S for 15 minutes, including:    Seated MWM into R & L " rotational glides with UPA grade 2-3  Seated UPA C5-T2  Prone CTJ MWM chin tuck + chin tuck with rotation    NT:  Cervical upglides & lateral glides bilateral grade II-III  Manual UT stretch     Linn participated in neuromuscular re-education activities to improve: Balance, Coordination, Kinesthetic, Sense, Proprioception, and Posture for 00 minutes. The following activities were included:      Linn participated in dynamic functional therapeutic activities to improve functional performance for 00 minutes, including:  UBE 4'/4' pulling/pushing  - not today   Jewels shld flexion 5'       Home Exercises Provided and Patient Education Provided     Education provided:   - Compliance with HEP     Written Home Exercises Provided: yes.  Exercises were reviewed and Linn was able to demonstrate them prior to the end of the session.  Linn demonstrated good  understanding of the education provided.     Assessment   Patient continues to respond well with mobilization with movement techniques performed. Increased cervical rotation observed at end of session with Patient reporting ability to look at her left shoulder. She continues to have tightness throughout the cervical spine and remains mod limited but she is responding well to strengthening and joint mobilizations      Pt prognosis is Good.   Pt will continue to benefit from skilled outpatient physical therapy to address the deficits listed in the problem list box on initial evaluation, provide pt/family education and to maximize pt's level of independence in the home and community environment.     Pt's spiritual, cultural and educational needs considered and pt agreeable to plan of care and goals.    Anticipated barriers to physical therapy: none     Goals: Goals:  Short Term Goals: 4-6 weeks  1. Pt will be compliant with HEP 50% of prescribed amount.   2. The pt to demo improvement in pain free cervical left rotation by 10% of full ROM.  3.  Report decreased neck  pain  <   / =  2 /10  to increase tolerance for adls, work.  4. Pt will demo improvement in midline cervical alignment in sitting to improve posture.      Long Term Goals: 8-10 weeks   Pt will be compliant with % of prescribed amount.   Patient will improve their FOTO limitation score to at least 36% as evidence of clinically significant improvements in their function.  Pt will demo improvement in 20% of cervical left rotation to equal R cervical rotation.  The pt will report full participation in ADLs and IADLs without restrictions related to neck pain.         Plan     Continue per established POC     Linden Ramos, PT

## 2023-08-22 DIAGNOSIS — E11.59 HYPERTENSION ASSOCIATED WITH DIABETES: ICD-10-CM

## 2023-08-22 DIAGNOSIS — I15.2 HYPERTENSION ASSOCIATED WITH DIABETES: ICD-10-CM

## 2023-08-22 RX ORDER — OLMESARTAN MEDOXOMIL 5 MG/1
TABLET ORAL
Qty: 90 TABLET | Refills: 3 | OUTPATIENT
Start: 2023-08-22

## 2023-08-22 NOTE — TELEPHONE ENCOUNTER
Refill Decision Note   Linn Price  is requesting a refill authorization.  Brief Assessment and Rationale for Refill:  Quick Discontinue     Medication Therapy Plan:  Med d/c by Charlotte Diaz, RubenD on 1/18/2023; [facility]      Comments:     Note composed:3:35 PM 08/22/2023

## 2023-08-22 NOTE — TELEPHONE ENCOUNTER
No care due was identified.  [facility] Embedded Care Due Messages. Reference number: [id number].   8/22/2023 1:42:37 PM CDT

## 2023-08-28 ENCOUNTER — PATIENT MESSAGE (OUTPATIENT)
Dept: ADMINISTRATIVE | Facility: HOSPITAL | Age: 63
End: 2023-08-28
Payer: MEDICARE

## 2023-08-31 NOTE — PROGRESS NOTES
Physical Therapy Daily Treatment Note     Name: Linn Price  [facility] Number: [id number]    Therapy Diagnosis:        Encounter Diagnoses   Name Primary?    Chronic neck pain      Posture abnormality      Decreased strength of upper extremity      Impaired range of motion of cervical spine        Physician: Erika Mcclure MD     Physician Orders: PT Eval and Treat   Medical Diagnosis from Referral: M54.2,G89.29 (ICD-10-CM) - Chronic neck pain  Evaluation Date: 6/29/2023  Authorization Period Expiration: 12/31/2023  Plan of Care Expiration: 09/20/2023  Progress Note Due: 09/20/2023  Visit # / Visits authorized: 9/ 20  FOTO: 1/3      Precautions: Standard, Diabetes, blood thinners, and elevated blood glucose      Time In: 8:50 AM  Time Out: 9:20 AM  Total Appointment Time (timed & untimed codes): 30 minutes    Subjective     Pt reports: no pain, but she continues to have stiffness. She states she felt more mobile after last visit. She needs to end session early today to get to work and would like to take it easy with exercises since she had a colonoscopy yesterday.     She was  with home exercise program.   Response to previous treatment: decreased stiffness  Functional change: in progress    Pain: 0/10  Location: neck L > R     Objective     9/1/2023  CERVICAL AROM Pain/Dysfunction with Movement   Flexion   70%    Extension 10% Upper cervical extension, lower cervical remained in flexion   Right side bending   30%    Left side bending   10%    Right rotation 45% Rotation coupled with right SB   Left rotation 30% Rotation couple with left SB        Treatment      Linn received therapeutic exercises to develop strength, endurance, ROM, flexibility, posture, and core stabilization for 15 minutes including:    Upper trap stretch with upper extremity support and overpressure 3x30s both sides  Levator scapulae stretch with upper extremity support and overpressure 3x30s both sides  Prone on elbows chin tuck 20x  "3 sec hold - NP  B ER + cervical rotation at wall RTB x15 each side  Seated chin tuck 3 sec hold x20    NT:  Seated B cervical rotation w/towel 5 sec hold 2x10 ea  Seated cervical extension w/ towel 3 sec hold2 x10  Serratus wall slides with arms in pillowcase X 20  Seated horizontal abduction  Standing RTB shld ext 3x10/3"     Linn received the following manual therapy techniques: to the: C/S for 15 minutes, including:    Seated MWM into R & L rotational glides with UPA grade 2-3  Seated UPA C5-T2    NT:  Prone CTJ MWM chin tuck + chin tuck with rotation  Cervical upglides & lateral glides bilateral grade II-III      Linn participated in neuromuscular re-education activities to improve: Balance, Coordination, Kinesthetic, Sense, Proprioception, and Posture for 00 minutes. The following activities were included:      Linn participated in dynamic functional therapeutic activities to improve functional performance for 00 minutes, including:  UBE 4'/4' pulling/pushing  - not today   Jewels shld flexion 5'       Home Exercises Provided and Patient Education Provided     Education provided:   - Compliance with HEP     Written Home Exercises Provided: yes.  Exercises were reviewed and Linn was able to demonstrate them prior to the end of the session.  Linn demonstrated good  understanding of the education provided.     Assessment   Patient continues to respond well with mobilization with movement techniques performed. Increased cervical rotation observed at end of session with Patient reporting ability to look at her left shoulder. She continues to have tightness throughout the cervical spine and remains mod limited but she is responding well to strengthening and joint mobilizations      Pt prognosis is Good.   Pt will continue to benefit from skilled outpatient physical therapy to address the deficits listed in the problem list box on initial evaluation, provide pt/family education and to maximize pt's level of " independence in the home and community environment.     Pt's spiritual, cultural and educational needs considered and pt agreeable to plan of care and goals.    Anticipated barriers to physical therapy: none     Goals: Goals:  Short Term Goals: 4-6 weeks  1. Pt will be compliant with HEP 50% of prescribed amount.   MET  2. The pt to demo improvement in pain free cervical left rotation by 10% of full ROM.  Progressing  3.  Report decreased neck pain  <   / =  2 /10  to increase tolerance for adls, work.  MET  4. Pt will demo improvement in midline cervical alignment in sitting to improve posture.  Progressing     Long Term Goals: 8-10 weeks   Pt will be compliant with % of prescribed amount.  Progressing  Patient will improve their FOTO limitation score to at least 36% as evidence of clinically significant improvements in their function.  Progressing  Pt will demo improvement in 20% of cervical left rotation to equal R cervical rotation.  Progressing  The pt will report full participation in ADLs and IADLs without restrictions related to neck pain.  Progressing        Plan     Continue per established POC     Shanti Mora, PT

## 2023-09-01 ENCOUNTER — CLINICAL SUPPORT (OUTPATIENT)
Dept: REHABILITATION | Facility: HOSPITAL | Age: 63
End: 2023-09-01
Payer: MEDICARE

## 2023-09-01 ENCOUNTER — PATIENT OUTREACH (OUTPATIENT)
Dept: ADMINISTRATIVE | Facility: HOSPITAL | Age: 63
End: 2023-09-01
Payer: MEDICARE

## 2023-09-01 DIAGNOSIS — M53.82 IMPAIRED RANGE OF MOTION OF CERVICAL SPINE: ICD-10-CM

## 2023-09-01 DIAGNOSIS — R29.898 DECREASED STRENGTH OF UPPER EXTREMITY: ICD-10-CM

## 2023-09-01 DIAGNOSIS — R29.3 POSTURE ABNORMALITY: Primary | ICD-10-CM

## 2023-09-01 PROCEDURE — 97140 MANUAL THERAPY 1/> REGIONS: CPT

## 2023-09-01 PROCEDURE — 97110 THERAPEUTIC EXERCISES: CPT

## 2023-09-01 NOTE — PROGRESS NOTES
Patient due for the following    HIV Screening     Aspirin/Antiplatelet Therapy     Colorectal Cancer Screening     Eye Exam     Influenza Vaccine (1)    Hemoglobin A1c       Patient has upcoming appointment with pcp    Immunizations: reviewed and updated  Care Everywhere: triggered  Care Teams: up to date  Outreach: none needed

## 2023-09-05 ENCOUNTER — PATIENT OUTREACH (OUTPATIENT)
Dept: ADMINISTRATIVE | Facility: HOSPITAL | Age: 63
End: 2023-09-05
Payer: MEDICARE

## 2023-09-05 LAB — CRC RECOMMENDATION EXT: NORMAL

## 2023-09-05 NOTE — PROGRESS NOTES
Patient due for the following    HIV Screening     Aspirin/Antiplatelet Therapy     Influenza Vaccine (1)    Hemoglobin A1c       Received c-scope records.  Scanned to media.  updated    E-faxed Dr. Tang for recent eye exam records.    Immunizations: reviewed and updated  Care Everywhere: triggered  Care Teams: up to date  Outreach: none needed

## 2023-09-05 NOTE — LETTER
AUTHORIZATION FOR RELEASE OF   CONFIDENTIAL INFORMATION    Dear Dr. Tang,    We are seeing Linn Price, date of birth 1960, in the clinic at [facility]. Erika Mcclure MD is the patient's PCP. Linn Price has an outstanding lab/procedure at the time we reviewed her chart. In order to help keep her health information updated, she has authorized us to request the following medical record(s):        (  )  MAMMOGRAM                                      (  )  COLONOSCOPY      (  )  PAP SMEAR                                          (  )  OUTSIDE LAB RESULTS     (  )  DEXA SCAN                                          ( X )  EYE EXAM            (  )  FOOT EXAM                                          (  )  ENTIRE RECORD     (  )  OUTSIDE IMMUNIZATIONS                 (  )  _______________         Please fax records to [facility], Giambrone, Nicole A., MD, [phone]     If you have any questions, please contact Melissa at [phone].           Patient Name: Linn Price  : 1960  Patient Phone #: [phone]

## 2023-09-06 ENCOUNTER — PATIENT OUTREACH (OUTPATIENT)
Dept: ADMINISTRATIVE | Facility: HOSPITAL | Age: 63
End: 2023-09-06
Payer: MEDICARE

## 2023-09-06 NOTE — PROGRESS NOTES
Patient due for the following    HIV Screening     Aspirin/Antiplatelet Therapy     Influenza Vaccine (1)    Hemoglobin A1c       Received dm eye exam records.  Scanned to media.  updated    Immunizations: reviewed and updated  Care Everywhere: triggered  Care Teams: up to date  Outreach: none needed

## 2023-09-11 ENCOUNTER — CLINICAL SUPPORT (OUTPATIENT)
Dept: REHABILITATION | Facility: HOSPITAL | Age: 63
End: 2023-09-11
Payer: MEDICARE

## 2023-09-11 DIAGNOSIS — M53.82 IMPAIRED RANGE OF MOTION OF CERVICAL SPINE: ICD-10-CM

## 2023-09-11 DIAGNOSIS — R29.3 POSTURE ABNORMALITY: Primary | ICD-10-CM

## 2023-09-11 DIAGNOSIS — R29.898 DECREASED STRENGTH OF UPPER EXTREMITY: ICD-10-CM

## 2023-09-11 PROCEDURE — 97110 THERAPEUTIC EXERCISES: CPT

## 2023-09-11 PROCEDURE — 97140 MANUAL THERAPY 1/> REGIONS: CPT

## 2023-09-11 NOTE — PROGRESS NOTES
"  Physical Therapy Daily Treatment Note     Name: Linn Price  Clinic Number: [id number]    Therapy Diagnosis:        Encounter Diagnoses   Name Primary?    Chronic neck pain      Posture abnormality      Decreased strength of upper extremity      Impaired range of motion of cervical spine        Physician: Erika Mcclure MD     Physician Orders: PT Eval and Treat   Medical Diagnosis from Referral: M54.2,G89.29 (ICD-10-CM) - Chronic neck pain  Evaluation Date: 6/29/2023  Authorization Period Expiration: 12/31/2023  Plan of Care Expiration: 09/20/2023  Progress Note Due: 09/20/2023  Visit # / Visits authorized: 11/ 20  FOTO: 1/3      Precautions: Standard, Diabetes, blood thinners, and elevated blood glucose      Time In: 4:20 PM  Time Out: 5:05 PM  Total Appointment Time (timed & untimed codes): 25 minutes (1 on 1 time)    Subjective     Pt reports: no pain, but she continues to have stiffness. She states she felt more mobile after last visit.     She was  with home exercise program.   Response to previous treatment: decreased stiffness  Functional change: in progress    Pain: 0/10  Location: neck L > R     Objective     9/1/2023  CERVICAL AROM Pain/Dysfunction with Movement   Flexion   70%    Extension 10% Upper cervical extension, lower cervical remained in flexion   Right side bending   30%    Left side bending   10%    Right rotation 45% Rotation coupled with right SB   Left rotation 30% Rotation couple with left SB        Treatment      Linn received therapeutic exercises to develop strength, endurance, ROM, flexibility, posture, and core stabilization for 25 minutes including:    Upper trap stretch with upper extremity support and overpressure 3x30s both sides  Levator scapulae stretch with upper extremity support and overpressure 3x30s both sides  Prone on elbows chin tuck 20x 3 sec hold - NP  B ER + cervical rotation at wall RTB x15 each side  Seated chin tuck 3 sec hold x20  Open book 3" hold B " "x15 ea  Rows RTB 3x10      NT:  Seated B cervical rotation w/towel 5 sec hold 2x10 ea  Seated cervical extension w/ towel 3 sec hold2 x10  Serratus wall slides with arms in pillowcase X 20  Seated horizontal abduction  Standing RTB shld ext 3x10/3"     Linn received the following manual therapy techniques: to the: C/S for 15 minutes, including:    CPA and UPA thoracic mobs, prone   Seated MWM into R & L rotational glides with UPA grade 2-3  Seated UPA C5-T2    NT:  Prone CTJ MWM chin tuck + chin tuck with rotation        Linn participated in neuromuscular re-education activities to improve: Balance, Coordination, Kinesthetic, Sense, Proprioception, and Posture for 00 minutes. The following activities were included:      Linn participated in dynamic functional therapeutic activities to improve functional performance for 00 minutes, including:  UBE 4'/4' pulling/pushing  - not today   Jewels shld flexion 5'       Home Exercises Provided and Patient Education Provided     Education provided:   - Compliance with HEP     Written Home Exercises Provided: yes.  Exercises were reviewed and Linn was able to demonstrate them prior to the end of the session.  Linn demonstrated good  understanding of the education provided.     Assessment   Patient continues to respond well with mobilization with movement techniques performed. Also performed prone thoracic mobs today which she tolerated well. Increased cervical rotation observed at end of session with Patient reporting ability to look at her left shoulder. She continues to have tightness throughout the cervical spine and remains mod limited but she is responding well to strengthening and joint mobilizations      Pt prognosis is Good.   Pt will continue to benefit from skilled outpatient physical therapy to address the deficits listed in the problem list box on initial evaluation, provide pt/family education and to maximize pt's level of independence in the home and " community environment.     Pt's spiritual, cultural and educational needs considered and pt agreeable to plan of care and goals.    Anticipated barriers to physical therapy: none     Goals: Goals:  Short Term Goals: 4-6 weeks  1. Pt will be compliant with HEP 50% of prescribed amount.   MET  2. The pt to demo improvement in pain free cervical left rotation by 10% of full ROM.  Progressing  3.  Report decreased neck pain  <   / =  2 /10  to increase tolerance for adls, work.  MET  4. Pt will demo improvement in midline cervical alignment in sitting to improve posture.  Progressing     Long Term Goals: 8-10 weeks   Pt will be compliant with % of prescribed amount.  Progressing  Patient will improve their FOTO limitation score to at least 36% as evidence of clinically significant improvements in their function.  Progressing  Pt will demo improvement in 20% of cervical left rotation to equal R cervical rotation.  Progressing  The pt will report full participation in ADLs and IADLs without restrictions related to neck pain.  Progressing        Plan     Continue per established POC     Shanti Mora, PT

## 2023-09-13 ENCOUNTER — CLINICAL SUPPORT (OUTPATIENT)
Dept: REHABILITATION | Facility: HOSPITAL | Age: 63
End: 2023-09-13
Payer: MEDICARE

## 2023-09-13 DIAGNOSIS — R29.3 POSTURE ABNORMALITY: Primary | ICD-10-CM

## 2023-09-13 DIAGNOSIS — M53.82 IMPAIRED RANGE OF MOTION OF CERVICAL SPINE: ICD-10-CM

## 2023-09-13 DIAGNOSIS — R29.898 DECREASED STRENGTH OF UPPER EXTREMITY: ICD-10-CM

## 2023-09-13 PROCEDURE — 97110 THERAPEUTIC EXERCISES: CPT

## 2023-09-13 PROCEDURE — 97140 MANUAL THERAPY 1/> REGIONS: CPT

## 2023-09-13 NOTE — PROGRESS NOTES
Physical Therapy Daily Treatment Note     Name: Linn Price  [facility] Number: [id number]    Therapy Diagnosis:        Encounter Diagnoses   Name Primary?    Chronic neck pain      Posture abnormality      Decreased strength of upper extremity      Impaired range of motion of cervical spine        Physician: Erika Mcclure MD     Physician Orders: PT Eval and Treat   Medical Diagnosis from Referral: M54.2,G89.29 (ICD-10-CM) - Chronic neck pain  Evaluation Date: 6/29/2023  Authorization Period Expiration: 12/31/2023  Plan of Care Expiration: 09/20/2023  Progress Note Due: 09/20/2023  Visit # / Visits authorized: 9/ 20  FOTO: 1/3      Precautions: Standard, Diabetes, blood thinners, and elevated blood glucose      Time In: 4:20 PM  Time Out: 5:00 PM  Total Appointment Time (timed & untimed codes): 30 minutes    Subjective     Pt reports: no pain, but she continues to have stiffness. She states she felt more mobile after last visit. She needs to end session early today to get to work and would like to take it easy with exercises since she had a colonoscopy yesterday.     She was  with home exercise program.   Response to previous treatment: decreased stiffness  Functional change: in progress    Pain: 0/10  Location: neck L > R     Objective     9/1/2023  CERVICAL AROM Pain/Dysfunction with Movement   Flexion   70%    Extension 10% Upper cervical extension, lower cervical remained in flexion   Right side bending   30%    Left side bending   10%    Right rotation 45% Rotation coupled with right SB   Left rotation 30% Rotation couple with left SB        Treatment      Linn received therapeutic exercises to develop strength, endurance, ROM, flexibility, posture, and core stabilization for 15 minutes including:    Upper trap stretch with upper extremity support and overpressure 3x30s both sides  Levator scapulae stretch with upper extremity support and overpressure 3x30s both sides  Prone on elbows chin tuck 20x  "3 sec hold - NP  B ER + cervical rotation at wall RTB x15 each side  Seated chin tuck 3 sec hold x20  Open book 5" hold B x15 ea  Seated cervical extension w/ towel 10x 10 sec hold    NT:  Seated B cervical rotation w/towel 5 sec hold 2x10 ea  Serratus wall slides with arms in pillowcase X 20  Seated horizontal abduction  Standing RTB shld ext 3x10/3"     Linn received the following manual therapy techniques: to the: C/S for 15 minutes, including:    CPA and UPA thoracic mobs, prone   Contract Relax upper trap   Seated MWM into R & L rotational glides with UPA grade 2-3  Seated UPA C5-T2    NT:  Prone CTJ MWM chin tuck + chin tuck with rotation    Linn participated in neuromuscular re-education activities to improve: Balance, Coordination, Kinesthetic, Sense, Proprioception, and Posture for 00 minutes. The following activities were included:      Linn participated in dynamic functional therapeutic activities to improve functional performance for 00 minutes, including:  UBE 4'/4' pulling/pushing  - not today   Jewels shld flexion 5'       Home Exercises Provided and Patient Education Provided     Education provided:   - Compliance with HEP     Written Home Exercises Provided: yes.  Exercises were reviewed and Linn was able to demonstrate them prior to the end of the session.  Linn demonstrated good  understanding of the education provided.     Assessment   Patient continues to respond well with mobilization with movement techniques performed. Increased cervical rotation observed at end of session with Patient reporting ability to look at her left shoulder. She continues to have tightness throughout the cervical spine and remains mod limited but she is responding well to strengthening and joint mobilizations.      Pt prognosis is Good.   Pt will continue to benefit from skilled outpatient physical therapy to address the deficits listed in the problem list box on initial evaluation, provide pt/family " education and to maximize pt's level of independence in the home and community environment.     Pt's spiritual, cultural and educational needs considered and pt agreeable to plan of care and goals.    Anticipated barriers to physical therapy: none     Goals: Goals:  Short Term Goals: 4-6 weeks  1. Pt will be compliant with HEP 50% of prescribed amount.   MET  2. The pt to demo improvement in pain free cervical left rotation by 10% of full ROM.  Progressing  3.  Report decreased neck pain  <   / =  2 /10  to increase tolerance for adls, work.  MET  4. Pt will demo improvement in midline cervical alignment in sitting to improve posture.  Progressing     Long Term Goals: 8-10 weeks   Pt will be compliant with % of prescribed amount.  Progressing  Patient will improve their FOTO limitation score to at least 36% as evidence of clinically significant improvements in their function.  Progressing  Pt will demo improvement in 20% of cervical left rotation to equal R cervical rotation.  Progressing  The pt will report full participation in ADLs and IADLs without restrictions related to neck pain.  Progressing        Plan     Continue per established POC     Linden Ramos, PT

## 2023-09-17 NOTE — PROGRESS NOTES
ENDOCRINOLOGY CLINIC  09/18/2023     Subjective:      Patient ID: Linn Price is referred by Tatiana Walker NP     Chief Complaint:  Type 2 diabetes    HPI:   Linn Price is a 63 y.o. female who presents for management of type 2 diabetes.      Diabetes Hx:  Diagnosed w/ DM: Dx in her late 50s.   Complications:   Retinopathy:  Denies   Last eye exam: : 07/19/2023  Neuropathy: No   Last foot exam: : 05/16/2023  Nephropathy: No  Cardiovascular: No  Gastroparesis: No  DKA/HHS: No    Severe Hypoglycemia: No, Hypoglycemia unawareness: No  Hypoglycemic episodes:  Patient denies any low blood sugars.    Current meds:   Metformin  mg , 2 tablets daily  Ozempic  0.5 mg once a week.  Patient says she had been off the Ozempic for about 4 weeks for her colonoscopy, just started back yesterday.   Mild nausea and constipation on the Ozempic    Compliance with meds: No     Previous meds:  Trulicity - d/c after her blood sugars improved.      Home glucose checks:   Compliant: Yes, Once a day random  On recall - usually around 140-150s. Occasional 200-300s, lowest was 110.     Diet/Exercise:   Takes a milk shake in the morning  Usually eats 2-3 meals a day.   Dinner at 6:30 pm, no bedtime snacks  Snacks on yogurt, hummus, cucumber during the day.  Patient says recently she had been going to more parties, has had more food and alcohol intake.  Patient had been doing treadmill but not in the last few weeks.    Diabetes education:  Several years back.     Last A1c:   Lab Results   Component Value Date    HGBA1C 12.4 (H) 06/14/2023    HGBA1C 7.1 (H) 06/20/2022    HGBA1C 6.0 (H) 07/12/2021     Microalbumin:   Lab Results   Component Value Date    LABMICR 11.0 06/14/2023    CREATRANDUR 54.0 06/14/2023    MICALBCREAT 20.4 06/14/2023     Lab Results   Component Value Date    EGFRNORACEVR >60.0 07/05/2023    CREATININE 0.9 07/05/2023       Lipids:   Lab Results   Component Value Date    CHOL 198 06/14/2023    TRIG 101  06/14/2023    HDL 60 06/14/2023    LDLCALC 117.8 06/14/2023    CHOLHDL 30.3 06/14/2023     TSH:  Lab Results   Component Value Date    TSH 0.965 06/14/2023     Vitamin B12    Lab Results   Component Value Date    HGB 13.1 06/14/2023        Aspirin: Yes  Statins: Never used statins.  Patient was prescribed rosuvastatin but has not started it.  ACEI/ARB: Yes, on Lisinopril  Fatty liver: Yes  HTN:  Controlled on medications.      Denies history of pancreatitis or medullary thyroid cancer.  History recurrent UTI: No  History of recurrent fungal infection: No    Polyuria: No  Polydipsia: No      FHx of DM: Yes  Heart disease: Yes    ROS: see HPI     Objective:     Physical Exam     /72 (BP Location: Right arm, Patient Position: Sitting, BP Method: Large (Automatic))   Pulse 74   Wt 90.2 kg (198 lb 13.7 oz)   LMP  (LMP Unknown) Comment:    2009  SpO2 97%   BMI 33.09 kg/m²     Wt Readings from Last 3 Encounters:   09/18/23 90.2 kg (198 lb 13.7 oz)   08/16/23 89.4 kg (197 lb)   08/15/23 89.4 kg (197 lb)       Constitutional:  Pleasant,  in no acute distress.   HENT:   Head:    Normocephalic and atraumatic.   Eyes:    EOMI. No scleral icterus.   Cardiovascular:  Normal rate  Respiratory:   Effort normal   Neurological:  No tremor  Skin:    Skin is warm, dry  Extremity:  No edema    DIABETIC FOOT EXAM: 9/18/2023 - normal sensation to microfilament testing bilaterally.  No fissures, wounds, or ulcers.      LABORATORY REVIEW:  See HPI for other labs reviewed today      Chemistry        Component Value Date/Time     07/05/2023 1311    K 4.7 07/05/2023 1311     07/05/2023 1311    CO2 28 07/05/2023 1311    BUN 14 07/05/2023 1311    BUN 13.0 06/20/2022 1528    CREATININE 0.9 07/05/2023 1311     (H) 07/05/2023 1311        Component Value Date/Time    CALCIUM 9.9 07/05/2023 1311    ALKPHOS 91 06/14/2023 0900    AST 16 06/14/2023 0900    ALT 23 06/14/2023 0900    BILITOT 0.5 06/14/2023 0900     "ESTGFRAFRICA 102 05/01/2021 0828    EGFRNONAA 88 05/01/2021 0828          Lab Results   Component Value Date    HGBA1C 12.4 (H) 06/14/2023    HGBA1C 7.1 (H) 06/20/2022    HGBA1C 6.0 (H) 07/12/2021     Other labs reviewed today in HPI    Assessment/Plan:       Problem List Items Addressed This Visit          Cardiac/Vascular    Hyperlipidemia associated with type 2 diabetes mellitus       Patient denies taking any statins and does not want start it.   Patient has appointment with Cardiology and has been recommended to discuss statins.   Discussed increased risk of ASCVD with diabetes.      Lipids:   Lab Results   Component Value Date    CHOL 198 06/14/2023    TRIG 101 06/14/2023    HDL 60 06/14/2023    LDLCALC 117.8 06/14/2023    CHOLHDL 30.3 06/14/2023               Endocrine    Uncontrolled type 2 diabetes mellitus with hyperglycemia, without long-term current use of insulin       Recent A1c was 12.4%.  Patient has A1c ordered for next month.  Patient had been off her Ozempic for 4 weeks and restarted it yesterday.  Continue her Ozempic and metformin.  Patient does report eating more "party food" recently.  Call if any side effects from the medications    Discussed goal A1c of 6.5-7%  Call if blood sugars are persistently less than  70 or greater than 200.     Referral to diabetes educator.     Discussed importance of diet and lifestyle modifications for diabetes management  Hypoglycemia management discussed. Carry glucose tablets or snacks at all times.     Complications:  Follow up for regular diabetes eye exam  Daily self examination of feet  Microalbumin: Monitor. On ACEI.     Last A1c:   Lab Results   Component Value Date    HGBA1C 12.4 (H) 06/14/2023     microalbumin:   Lab Results   Component Value Date    LABMICR 11.0 06/14/2023    CREATRANDUR 54.0 06/14/2023    MICALBCREAT 20.4 06/14/2023            Relevant Orders    Ambulatory referral/consult to Diabetes Education        Follow up in about 3 months " (around 12/18/2023).       Heidy Coy MD

## 2023-09-18 ENCOUNTER — OFFICE VISIT (OUTPATIENT)
Dept: ENDOCRINOLOGY | Facility: CLINIC | Age: 63
End: 2023-09-18
Payer: MEDICARE

## 2023-09-18 VITALS
BODY MASS INDEX: 33.09 KG/M2 | HEART RATE: 74 BPM | SYSTOLIC BLOOD PRESSURE: 111 MMHG | DIASTOLIC BLOOD PRESSURE: 72 MMHG | OXYGEN SATURATION: 97 % | WEIGHT: 198.88 LBS

## 2023-09-18 DIAGNOSIS — E78.5 HYPERLIPIDEMIA ASSOCIATED WITH TYPE 2 DIABETES MELLITUS: ICD-10-CM

## 2023-09-18 DIAGNOSIS — E11.65 TYPE 2 DIABETES MELLITUS WITH HYPERGLYCEMIA, WITHOUT LONG-TERM CURRENT USE OF INSULIN: ICD-10-CM

## 2023-09-18 DIAGNOSIS — E11.69 HYPERLIPIDEMIA ASSOCIATED WITH TYPE 2 DIABETES MELLITUS: ICD-10-CM

## 2023-09-18 DIAGNOSIS — E11.65 UNCONTROLLED TYPE 2 DIABETES MELLITUS WITH HYPERGLYCEMIA, WITHOUT LONG-TERM CURRENT USE OF INSULIN: Primary | ICD-10-CM

## 2023-09-18 PROBLEM — Z00.00 ENCOUNTER FOR PREVENTIVE HEALTH EXAMINATION: Chronic | Status: RESOLVED | Noted: 2023-06-19 | Resolved: 2023-09-18

## 2023-09-18 PROBLEM — Z00.00 ENCOUNTER FOR MEDICARE ANNUAL WELLNESS EXAM: Chronic | Status: RESOLVED | Noted: 2023-06-19 | Resolved: 2023-09-18

## 2023-09-18 PROCEDURE — 3046F HEMOGLOBIN A1C LEVEL >9.0%: CPT | Mod: CPTII,S$GLB,, | Performed by: INTERNAL MEDICINE

## 2023-09-18 PROCEDURE — 1160F PR REVIEW ALL MEDS BY PRESCRIBER/CLIN PHARMACIST DOCUMENTED: ICD-10-PCS | Mod: CPTII,S$GLB,, | Performed by: INTERNAL MEDICINE

## 2023-09-18 PROCEDURE — 3008F BODY MASS INDEX DOCD: CPT | Mod: CPTII,S$GLB,, | Performed by: INTERNAL MEDICINE

## 2023-09-18 PROCEDURE — 4010F ACE/ARB THERAPY RXD/TAKEN: CPT | Mod: CPTII,S$GLB,, | Performed by: INTERNAL MEDICINE

## 2023-09-18 PROCEDURE — 99999 PR PBB SHADOW E&M-EST. PATIENT-LVL V: ICD-10-PCS | Mod: PBBFAC,,, | Performed by: INTERNAL MEDICINE

## 2023-09-18 PROCEDURE — 3288F FALL RISK ASSESSMENT DOCD: CPT | Mod: CPTII,S$GLB,, | Performed by: INTERNAL MEDICINE

## 2023-09-18 PROCEDURE — 99204 PR OFFICE/OUTPT VISIT, NEW, LEVL IV, 45-59 MIN: ICD-10-PCS | Mod: S$GLB,,, | Performed by: INTERNAL MEDICINE

## 2023-09-18 PROCEDURE — 3061F NEG MICROALBUMINURIA REV: CPT | Mod: CPTII,S$GLB,, | Performed by: INTERNAL MEDICINE

## 2023-09-18 PROCEDURE — 4010F PR ACE/ARB THEARPY RXD/TAKEN: ICD-10-PCS | Mod: CPTII,S$GLB,, | Performed by: INTERNAL MEDICINE

## 2023-09-18 PROCEDURE — 99204 OFFICE O/P NEW MOD 45 MIN: CPT | Mod: S$GLB,,, | Performed by: INTERNAL MEDICINE

## 2023-09-18 PROCEDURE — 1159F PR MEDICATION LIST DOCUMENTED IN MEDICAL RECORD: ICD-10-PCS | Mod: CPTII,S$GLB,, | Performed by: INTERNAL MEDICINE

## 2023-09-18 PROCEDURE — 3046F PR MOST RECENT HEMOGLOBIN A1C LEVEL > 9.0%: ICD-10-PCS | Mod: CPTII,S$GLB,, | Performed by: INTERNAL MEDICINE

## 2023-09-18 PROCEDURE — 3061F PR NEG MICROALBUMINURIA RESULT DOCUMENTED/REVIEW: ICD-10-PCS | Mod: CPTII,S$GLB,, | Performed by: INTERNAL MEDICINE

## 2023-09-18 PROCEDURE — 1159F MED LIST DOCD IN RCRD: CPT | Mod: CPTII,S$GLB,, | Performed by: INTERNAL MEDICINE

## 2023-09-18 PROCEDURE — 3066F NEPHROPATHY DOC TX: CPT | Mod: CPTII,S$GLB,, | Performed by: INTERNAL MEDICINE

## 2023-09-18 PROCEDURE — 99999 PR PBB SHADOW E&M-EST. PATIENT-LVL V: CPT | Mod: PBBFAC,,, | Performed by: INTERNAL MEDICINE

## 2023-09-18 PROCEDURE — 3066F PR DOCUMENTATION OF TREATMENT FOR NEPHROPATHY: ICD-10-PCS | Mod: CPTII,S$GLB,, | Performed by: INTERNAL MEDICINE

## 2023-09-18 PROCEDURE — 3078F PR MOST RECENT DIASTOLIC BLOOD PRESSURE < 80 MM HG: ICD-10-PCS | Mod: CPTII,S$GLB,, | Performed by: INTERNAL MEDICINE

## 2023-09-18 PROCEDURE — 3008F PR BODY MASS INDEX (BMI) DOCUMENTED: ICD-10-PCS | Mod: CPTII,S$GLB,, | Performed by: INTERNAL MEDICINE

## 2023-09-18 PROCEDURE — 3074F SYST BP LT 130 MM HG: CPT | Mod: CPTII,S$GLB,, | Performed by: INTERNAL MEDICINE

## 2023-09-18 PROCEDURE — 3078F DIAST BP <80 MM HG: CPT | Mod: CPTII,S$GLB,, | Performed by: INTERNAL MEDICINE

## 2023-09-18 PROCEDURE — 3074F PR MOST RECENT SYSTOLIC BLOOD PRESSURE < 130 MM HG: ICD-10-PCS | Mod: CPTII,S$GLB,, | Performed by: INTERNAL MEDICINE

## 2023-09-18 PROCEDURE — 3288F PR FALLS RISK ASSESSMENT DOCUMENTED: ICD-10-PCS | Mod: CPTII,S$GLB,, | Performed by: INTERNAL MEDICINE

## 2023-09-18 PROCEDURE — 1101F PT FALLS ASSESS-DOCD LE1/YR: CPT | Mod: CPTII,S$GLB,, | Performed by: INTERNAL MEDICINE

## 2023-09-18 PROCEDURE — 1101F PR PT FALLS ASSESS DOC 0-1 FALLS W/OUT INJ PAST YR: ICD-10-PCS | Mod: CPTII,S$GLB,, | Performed by: INTERNAL MEDICINE

## 2023-09-18 PROCEDURE — 1160F RVW MEDS BY RX/DR IN RCRD: CPT | Mod: CPTII,S$GLB,, | Performed by: INTERNAL MEDICINE

## 2023-09-18 NOTE — ASSESSMENT & PLAN NOTE
"  Recent A1c was 12.4%.  Patient has A1c ordered for next month.  Patient had been off her Ozempic for 4 weeks and restarted it yesterday.  Continue her Ozempic and metformin.  Patient does report eating more "party food" recently.  Call if any side effects from the medications    Discussed goal A1c of 6.5-7%  Call if blood sugars are persistently less than  70 or greater than 200.     Referral to diabetes educator.     Discussed importance of diet and lifestyle modifications for diabetes management  Hypoglycemia management discussed. Carry glucose tablets or snacks at all times.     Complications:  Follow up for regular diabetes eye exam  Daily self examination of feet  Microalbumin: Monitor. On ACEI.     Last A1c:   Lab Results   Component Value Date    HGBA1C 12.4 (H) 06/14/2023       microalbumin:   Lab Results   Component Value Date    LABMICR 11.0 06/14/2023    CREATRANDUR 54.0 06/14/2023    MICALBCREAT 20.4 06/14/2023       "

## 2023-09-18 NOTE — ASSESSMENT & PLAN NOTE
Patient denies taking any statins and does not want start it.   Patient has appointment with Cardiology and has been recommended to discuss statins.   Discussed increased risk of ASCVD with diabetes.      Lipids:   Lab Results   Component Value Date    CHOL 198 06/14/2023    TRIG 101 06/14/2023    HDL 60 06/14/2023    LDLCALC 117.8 06/14/2023    CHOLHDL 30.3 06/14/2023

## 2023-09-21 ENCOUNTER — OFFICE VISIT (OUTPATIENT)
Dept: CARDIOLOGY | Facility: CLINIC | Age: 63
End: 2023-09-21
Payer: MEDICARE

## 2023-09-21 VITALS
WEIGHT: 196 LBS | SYSTOLIC BLOOD PRESSURE: 127 MMHG | BODY MASS INDEX: 32.65 KG/M2 | HEART RATE: 69 BPM | DIASTOLIC BLOOD PRESSURE: 69 MMHG | HEIGHT: 65 IN

## 2023-09-21 DIAGNOSIS — E78.5 HYPERLIPIDEMIA ASSOCIATED WITH TYPE 2 DIABETES MELLITUS: ICD-10-CM

## 2023-09-21 DIAGNOSIS — E11.59 HYPERTENSION ASSOCIATED WITH DIABETES: ICD-10-CM

## 2023-09-21 DIAGNOSIS — I15.2 HYPERTENSION ASSOCIATED WITH DIABETES: ICD-10-CM

## 2023-09-21 DIAGNOSIS — I25.10 ATHEROSCLEROSIS OF NATIVE CORONARY ARTERY OF NATIVE HEART WITHOUT ANGINA PECTORIS: ICD-10-CM

## 2023-09-21 DIAGNOSIS — I70.0 AORTIC ATHEROSCLEROSIS: Primary | ICD-10-CM

## 2023-09-21 DIAGNOSIS — E11.69 HYPERLIPIDEMIA ASSOCIATED WITH TYPE 2 DIABETES MELLITUS: ICD-10-CM

## 2023-09-21 PROCEDURE — 1101F PR PT FALLS ASSESS DOC 0-1 FALLS W/OUT INJ PAST YR: ICD-10-PCS | Mod: CPTII,S$GLB,, | Performed by: INTERNAL MEDICINE

## 2023-09-21 PROCEDURE — 4010F ACE/ARB THERAPY RXD/TAKEN: CPT | Mod: CPTII,S$GLB,, | Performed by: INTERNAL MEDICINE

## 2023-09-21 PROCEDURE — 1101F PT FALLS ASSESS-DOCD LE1/YR: CPT | Mod: CPTII,S$GLB,, | Performed by: INTERNAL MEDICINE

## 2023-09-21 PROCEDURE — 3061F PR NEG MICROALBUMINURIA RESULT DOCUMENTED/REVIEW: ICD-10-PCS | Mod: CPTII,S$GLB,, | Performed by: INTERNAL MEDICINE

## 2023-09-21 PROCEDURE — 1159F MED LIST DOCD IN RCRD: CPT | Mod: CPTII,S$GLB,, | Performed by: INTERNAL MEDICINE

## 2023-09-21 PROCEDURE — 3078F DIAST BP <80 MM HG: CPT | Mod: CPTII,S$GLB,, | Performed by: INTERNAL MEDICINE

## 2023-09-21 PROCEDURE — 1159F PR MEDICATION LIST DOCUMENTED IN MEDICAL RECORD: ICD-10-PCS | Mod: CPTII,S$GLB,, | Performed by: INTERNAL MEDICINE

## 2023-09-21 PROCEDURE — 3074F PR MOST RECENT SYSTOLIC BLOOD PRESSURE < 130 MM HG: ICD-10-PCS | Mod: CPTII,S$GLB,, | Performed by: INTERNAL MEDICINE

## 2023-09-21 PROCEDURE — 99999 PR PBB SHADOW E&M-EST. PATIENT-LVL IV: ICD-10-PCS | Mod: PBBFAC,,, | Performed by: INTERNAL MEDICINE

## 2023-09-21 PROCEDURE — 3288F PR FALLS RISK ASSESSMENT DOCUMENTED: ICD-10-PCS | Mod: CPTII,S$GLB,, | Performed by: INTERNAL MEDICINE

## 2023-09-21 PROCEDURE — 3046F PR MOST RECENT HEMOGLOBIN A1C LEVEL > 9.0%: ICD-10-PCS | Mod: CPTII,S$GLB,, | Performed by: INTERNAL MEDICINE

## 2023-09-21 PROCEDURE — 1160F RVW MEDS BY RX/DR IN RCRD: CPT | Mod: CPTII,S$GLB,, | Performed by: INTERNAL MEDICINE

## 2023-09-21 PROCEDURE — 4010F PR ACE/ARB THEARPY RXD/TAKEN: ICD-10-PCS | Mod: CPTII,S$GLB,, | Performed by: INTERNAL MEDICINE

## 2023-09-21 PROCEDURE — 99999 PR PBB SHADOW E&M-EST. PATIENT-LVL IV: CPT | Mod: PBBFAC,,, | Performed by: INTERNAL MEDICINE

## 2023-09-21 PROCEDURE — 3066F PR DOCUMENTATION OF TREATMENT FOR NEPHROPATHY: ICD-10-PCS | Mod: CPTII,S$GLB,, | Performed by: INTERNAL MEDICINE

## 2023-09-21 PROCEDURE — 1160F PR REVIEW ALL MEDS BY PRESCRIBER/CLIN PHARMACIST DOCUMENTED: ICD-10-PCS | Mod: CPTII,S$GLB,, | Performed by: INTERNAL MEDICINE

## 2023-09-21 PROCEDURE — 3046F HEMOGLOBIN A1C LEVEL >9.0%: CPT | Mod: CPTII,S$GLB,, | Performed by: INTERNAL MEDICINE

## 2023-09-21 PROCEDURE — 99214 OFFICE O/P EST MOD 30 MIN: CPT | Mod: S$GLB,,, | Performed by: INTERNAL MEDICINE

## 2023-09-21 PROCEDURE — 3061F NEG MICROALBUMINURIA REV: CPT | Mod: CPTII,S$GLB,, | Performed by: INTERNAL MEDICINE

## 2023-09-21 PROCEDURE — 3008F PR BODY MASS INDEX (BMI) DOCUMENTED: ICD-10-PCS | Mod: CPTII,S$GLB,, | Performed by: INTERNAL MEDICINE

## 2023-09-21 PROCEDURE — 3078F PR MOST RECENT DIASTOLIC BLOOD PRESSURE < 80 MM HG: ICD-10-PCS | Mod: CPTII,S$GLB,, | Performed by: INTERNAL MEDICINE

## 2023-09-21 PROCEDURE — 99214 PR OFFICE/OUTPT VISIT, EST, LEVL IV, 30-39 MIN: ICD-10-PCS | Mod: S$GLB,,, | Performed by: INTERNAL MEDICINE

## 2023-09-21 PROCEDURE — 3288F FALL RISK ASSESSMENT DOCD: CPT | Mod: CPTII,S$GLB,, | Performed by: INTERNAL MEDICINE

## 2023-09-21 PROCEDURE — 3066F NEPHROPATHY DOC TX: CPT | Mod: CPTII,S$GLB,, | Performed by: INTERNAL MEDICINE

## 2023-09-21 PROCEDURE — 3074F SYST BP LT 130 MM HG: CPT | Mod: CPTII,S$GLB,, | Performed by: INTERNAL MEDICINE

## 2023-09-21 PROCEDURE — 3008F BODY MASS INDEX DOCD: CPT | Mod: CPTII,S$GLB,, | Performed by: INTERNAL MEDICINE

## 2023-09-21 RX ORDER — NAPROXEN SODIUM 220 MG/1
81 TABLET, FILM COATED ORAL DAILY
Refills: 0 | Status: CANCELLED
Start: 2023-09-21 | End: 2024-09-20

## 2023-09-21 RX ORDER — ASPIRIN 81 MG/1
81 TABLET ORAL DAILY
Refills: 0 | Status: CANCELLED
Start: 2023-09-21 | End: 2024-09-20

## 2023-09-21 NOTE — PROGRESS NOTES
HISTORY:    63-year-old female with a history of CAD on CT chest, hypertension, hyperlipidemia, prev tob, and diabetes presenting for initial evaluation by me.    The patient denies any symptoms of chest pain. Patient does note some LOERA and fatigue.     Remains active in her life. 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 Walks a lot for work.    Hasn't been taking any meds recently and A1c has increased significantly. Doesn't watch her diet.    The patient denies any previous history of myocardial infarction, peripheral arterial disease, stroke, congestive heart failure, or cardiomyopathy.      PHYSICAL EXAM:    Vitals:    09/21/23 0854   BP: 127/69   Pulse: 69     NAD, A+Ox3.  No jvd, no bruit.  RRR nml s1,s2. No murmurs.  CTA B no wheezes or crackles.  No edema.    LABS/STUDIES (imaging reviewed during clinic visit):    June 2023 CBC and CMP unremarkable with the exception of an elevated blood glucose.  A1c 12.4.   and HDL 60.  Triglycerides 101.  TSH normal.    ECG today demonstrates NSR no Qs/Sts.   CT chest 2021 normal heart size.  Aortic and coronary atherosclerosis.      ASSESSMENT & PLAN:    1. Aortic atherosclerosis    2. Atherosclerosis of native coronary artery of native heart without angina pectoris    3. Hyperlipidemia associated with type 2 diabetes mellitus    4. Hypertension associated with diabetes                Pt with CAD evidenced by CT scan.  Currently without angina.  Stress test showed no ischemia.    Will focus on RF modification as well. Pt understands the importance of DM management and is ready to recommit. Her A1c was above 12.  Follow-up with PCP.    Bps elevated.  Currently on lisinopril 10x1.  Blood pressure appears well controlled.    , would recommend high-dose statin therapy.  Patient does not want to be on a statin.      No follow-ups on file.  Return to clinic as needed.      RENITA Gomes MD

## 2023-09-22 ENCOUNTER — CLINICAL SUPPORT (OUTPATIENT)
Dept: REHABILITATION | Facility: HOSPITAL | Age: 63
End: 2023-09-22
Payer: MEDICARE

## 2023-09-22 DIAGNOSIS — R29.898 DECREASED STRENGTH OF UPPER EXTREMITY: ICD-10-CM

## 2023-09-22 DIAGNOSIS — R29.3 POSTURE ABNORMALITY: Primary | ICD-10-CM

## 2023-09-22 DIAGNOSIS — M53.82 IMPAIRED RANGE OF MOTION OF CERVICAL SPINE: ICD-10-CM

## 2023-09-22 PROCEDURE — 97110 THERAPEUTIC EXERCISES: CPT

## 2023-09-22 PROCEDURE — 97140 MANUAL THERAPY 1/> REGIONS: CPT

## 2023-09-25 ENCOUNTER — CLINICAL SUPPORT (OUTPATIENT)
Dept: REHABILITATION | Facility: HOSPITAL | Age: 63
End: 2023-09-25
Payer: MEDICARE

## 2023-09-25 DIAGNOSIS — R29.898 DECREASED STRENGTH OF UPPER EXTREMITY: ICD-10-CM

## 2023-09-25 DIAGNOSIS — R29.3 POSTURE ABNORMALITY: Primary | ICD-10-CM

## 2023-09-25 DIAGNOSIS — M53.82 IMPAIRED RANGE OF MOTION OF CERVICAL SPINE: ICD-10-CM

## 2023-09-25 PROCEDURE — 97110 THERAPEUTIC EXERCISES: CPT

## 2023-09-25 PROCEDURE — 97140 MANUAL THERAPY 1/> REGIONS: CPT

## 2023-09-25 NOTE — PROGRESS NOTES
Physical Therapy Daily Treatment Note     Name: Linn Price  Clinic Number: [id number]    Therapy Diagnosis:        Encounter Diagnoses   Name Primary?    Chronic neck pain      Posture abnormality      Decreased strength of upper extremity      Impaired range of motion of cervical spine        Physician: Erika Mcclure MD     Physician Orders: PT Eval and Treat   Medical Diagnosis from Referral: M54.2,G89.29 (ICD-10-CM) - Chronic neck pain  Evaluation Date: 6/29/2023  Authorization Period Expiration: 12/31/2023  Plan of Care Expiration: 09/20/2023  Progress Note Due: 09/20/2023  Visit # / Visits authorized: 9/ 20  FOTO: 1/3      Precautions: Standard, Diabetes, blood thinners, and elevated blood glucose      Time In: 1240 (10 min late arrival)  Time Out: 115 pm  Total Appointment Time (timed & untimed codes): 35 minutes    Subjective     Pt reports: no pain, but she continues to have stiffness. She had an increase in soreness but is waking with more stiffness than anything    She was  with home exercise program.   Response to previous treatment: decreased stiffness  Functional change: in progress    Pain: 0/10  Location: neck L > R     Objective     9/1/2023  CERVICAL AROM Pain/Dysfunction with Movement   Flexion   70%    Extension 10% Upper cervical extension, lower cervical remained in flexion   Right side bending   30%    Left side bending   10%    Right rotation 45% Rotation coupled with right SB   Left rotation 30% Rotation couple with left SB        Treatment      Linn received therapeutic exercises to develop strength, endurance, ROM, flexibility, posture, and core stabilization for 20 minutes including:    Upper trap stretch with upper extremity support and overpressure 3x30s both sides  Levator scapulae stretch with upper extremity support and overpressure 3x30s both sides  Prone on elbows chin tuck 20x 3 sec hold - NP  B ER + cervical rotation at wall RTB x15 each side  Seated chin tuck 3  "sec hold x20  Open book 5" hold B x15 ea - added YT resistance today  Seated cervical extension w/ towel 10x 10 sec hold    NT:  Seated B cervical rotation w/towel 5 sec hold 2x10 ea  Serratus wall slides with arms in pillowcase X 20  Seated horizontal abduction  Standing RTB shld ext 3x10/3"     Linn received the following manual therapy techniques: to the: C/S for 15 minutes, including:    CPA and UPA thoracic mobs, seated  Contract Relax upper trap   Seated MWM into R & L rotational glides with UPA grade 2-3  Seated UPA C5-T2    NT:  Prone CTJ MWM chin tuck + chin tuck with rotation    Linn participated in neuromuscular re-education activities to improve: Balance, Coordination, Kinesthetic, Sense, Proprioception, and Posture for 00 minutes. The following activities were included:      Linn participated in dynamic functional therapeutic activities to improve functional performance for 00 minutes, including:  UBE 4'/4' pulling/pushing  - not today   Jewels shld flexion 5'       Home Exercises Provided and Patient Education Provided     Education provided:   - Compliance with HEP     Written Home Exercises Provided: yes.  Exercises were reviewed and Linn was able to demonstrate them prior to the end of the session.  Linn demonstrated good  understanding of the education provided.     Assessment   Patient continues to show good improvement in range of motion into rotational movements but remains restricted in upper thoracic and lower cervical spine joint mobility. Functionally, she is showing good carryover and is having a good improvement in midscapular strength      Pt prognosis is Good.   Pt will continue to benefit from skilled outpatient physical therapy to address the deficits listed in the problem list box on initial evaluation, provide pt/family education and to maximize pt's level of independence in the home and community environment.     Pt's spiritual, cultural and educational needs considered " and pt agreeable to plan of care and goals.    Anticipated barriers to physical therapy: none     Goals: Goals:  Short Term Goals: 4-6 weeks  1. Pt will be compliant with HEP 50% of prescribed amount.   MET  2. The pt to demo improvement in pain free cervical left rotation by 10% of full ROM.  Progressing  3.  Report decreased neck pain  <   / =  2 /10  to increase tolerance for adls, work.  MET  4. Pt will demo improvement in midline cervical alignment in sitting to improve posture.  Progressing     Long Term Goals: 8-10 weeks   Pt will be compliant with % of prescribed amount.  Progressing  Patient will improve their FOTO limitation score to at least 36% as evidence of clinically significant improvements in their function.  Progressing  Pt will demo improvement in 20% of cervical left rotation to equal R cervical rotation.  Progressing  The pt will report full participation in ADLs and IADLs without restrictions related to neck pain.  Progressing        Plan     Continue per established POC     Marcella Wilcox, PT

## 2023-09-25 NOTE — PROGRESS NOTES
Physical Therapy Daily Treatment Note     Name: Linn Price  Clinic Number: [id number]    Therapy Diagnosis:        Encounter Diagnoses   Name Primary?    Chronic neck pain      Posture abnormality      Decreased strength of upper extremity      Impaired range of motion of cervical spine        Physician: Erika Mcclure MD     Physician Orders: PT Eval and Treat   Medical Diagnosis from Referral: M54.2,G89.29 (ICD-10-CM) - Chronic neck pain  Evaluation Date: 6/29/2023  Authorization Period Expiration: 12/31/2023  Plan of Care Expiration: 09/20/2023  Progress Note Due: 09/20/2023  Visit # / Visits authorized: 13/20  FOTO: 1/3      Precautions: Standard, Diabetes, blood thinners, and elevated blood glucose      Time In: 1:55 PM  Time Out: 2:25 PM  Total Appointment Time (timed & untimed codes): 30 minutes    Subjective     Pt reports: no pain, but she continues to have stiffness. She states she felt more mobile after last visit. She needs to end session early today to get to work and would like to take it easy with exercises since she had a colonoscopy yesterday.     She was  with home exercise program.   Response to previous treatment: decreased stiffness  Functional change: in progress    Pain: 0/10  Location: neck L > R     Objective     9/1/2023  CERVICAL AROM Pain/Dysfunction with Movement   Flexion   70%    Extension 10% Upper cervical extension, lower cervical remained in flexion   Right side bending   30%    Left side bending   10%    Right rotation 45% Rotation coupled with right SB   Left rotation 30% Rotation couple with left SB        Treatment      Linn received therapeutic exercises to develop strength, endurance, ROM, flexibility, posture, and core stabilization for 15 minutes including:    Upper trap stretch with upper extremity support and overpressure 3x30s both sides  Levator scapulae stretch with upper extremity support and overpressure 3x30s both sides  B ER + cervical rotation at  "wall RTB x15 each side    NT  Seated chin tuck 3 sec hold x20  Open book 5" hold B x15 ea  Seated cervical extension w/ towel 10x 10 sec hold    NT:  Prone on elbows chin tuck 20x 3 sec hold - NP  Seated B cervical rotation w/towel 5 sec hold 2x10 ea  Serratus wall slides with arms in pillowcase X 20  Seated horizontal abduction  Standing RTB shld ext 3x10/3"     Linn received the following manual therapy techniques: to the: C/S for 15 minutes, including:    CPA and UPA thoracic mobs, prone   Contract Relax upper trap   Seated MWM into R & L rotational glides with UPA grade 2-3  Seated UPA C5-T2    NT:  Prone CTJ MWM chin tuck + chin tuck with rotation    Linn participated in neuromuscular re-education activities to improve: Balance, Coordination, Kinesthetic, Sense, Proprioception, and Posture for 00 minutes. The following activities were included:      Linn participated in dynamic functional therapeutic activities to improve functional performance for 00 minutes, including:  UBE 4'/4' pulling/pushing  - not today   Jewels shld flexion 5'       Home Exercises Provided and Patient Education Provided     Education provided:   - Compliance with HEP     Written Home Exercises Provided: yes.  Exercises were reviewed and Linn was able to demonstrate them prior to the end of the session.  Linn demonstrated good  understanding of the education provided.     Assessment   Patient continues to respond well with mobilization with movement techniques performed. Increased cervical rotation observed at end of session with Patient reporting ability to look at her left shoulder. She continues to have tightness throughout the cervical spine and remains mod limited but she is responding well to strengthening and joint mobilizations.    Pt prognosis is Good.   Pt will continue to benefit from skilled outpatient physical therapy to address the deficits listed in the problem list box on initial evaluation, provide pt/family " education and to maximize pt's level of independence in the home and community environment.     Pt's spiritual, cultural and educational needs considered and pt agreeable to plan of care and goals.    Anticipated barriers to physical therapy: none     Goals: Goals:  Short Term Goals: 4-6 weeks  1. Pt will be compliant with HEP 50% of prescribed amount.   MET  2. The pt to demo improvement in pain free cervical left rotation by 10% of full ROM.  Progressing  3.  Report decreased neck pain  <   / =  2 /10  to increase tolerance for adls, work.  MET  4. Pt will demo improvement in midline cervical alignment in sitting to improve posture.  Progressing     Long Term Goals: 8-10 weeks   Pt will be compliant with % of prescribed amount.  Progressing  Patient will improve their FOTO limitation score to at least 36% as evidence of clinically significant improvements in their function.  Progressing  Pt will demo improvement in 20% of cervical left rotation to equal R cervical rotation.  Progressing  The pt will report full participation in ADLs and IADLs without restrictions related to neck pain.  Progressing        Plan     Continue per established POC     Linden Ramos, PT

## 2023-09-27 ENCOUNTER — PATIENT OUTREACH (OUTPATIENT)
Dept: ADMINISTRATIVE | Facility: HOSPITAL | Age: 63
End: 2023-09-27
Payer: MEDICARE

## 2023-09-27 ENCOUNTER — CLINICAL SUPPORT (OUTPATIENT)
Dept: REHABILITATION | Facility: HOSPITAL | Age: 63
End: 2023-09-27
Payer: MEDICARE

## 2023-09-27 DIAGNOSIS — R29.898 DECREASED STRENGTH OF UPPER EXTREMITY: ICD-10-CM

## 2023-09-27 DIAGNOSIS — M53.82 IMPAIRED RANGE OF MOTION OF CERVICAL SPINE: ICD-10-CM

## 2023-09-27 DIAGNOSIS — R29.3 POSTURE ABNORMALITY: Primary | ICD-10-CM

## 2023-09-27 PROCEDURE — 97140 MANUAL THERAPY 1/> REGIONS: CPT

## 2023-09-27 PROCEDURE — 97110 THERAPEUTIC EXERCISES: CPT

## 2023-09-27 NOTE — PROGRESS NOTES
Health Maintenance Due   Topic Date Due    HIV Screening  Never done    Aspirin/Antiplatelet Therapy  Never done    Influenza Vaccine (1) Never done    Hemoglobin A1c  09/14/2023        Chart review done.   HM updated.   Immunizations reviewed & updated.   Care Everywhere updated.

## 2023-09-27 NOTE — PROGRESS NOTES
"  Physical Therapy Daily Treatment Note/ Reassessment     Name: Linn Price  Clinic Number: [id number]    Therapy Diagnosis:        Encounter Diagnoses   Name Primary?    Chronic neck pain      Posture abnormality      Decreased strength of upper extremity      Impaired range of motion of cervical spine        Physician: Erika Mcclure MD     Physician Orders: PT Eval and Treat   Medical Diagnosis from Referral: M54.2,G89.29 (ICD-10-CM) - Chronic neck pain  Evaluation Date: 6/29/2023  Authorization Period Expiration: 12/31/2023  Plan of Care Expiration: 10/27/2023  Progress Note Due: 10/27/2023  Visit # / Visits authorized: 14/ 20  FOTO: 1/3      Precautions: Standard, Diabetes, blood thinners, and elevated blood glucose      Time In: 335 PM  Time Out: 415  Total Appointment Time (timed & untimed codes): 40 minutes    Subjective     Pt reports: more tired than anything today, she is feeling soreness in the neck and mid back this afternoon, exercises going well at home    She was  with home exercise program.   Response to previous treatment: decreased stiffness  Functional change: in progress    Pain: 0/10  Location: neck L > R     Objective     9/1/2023  CERVICAL AROM Pain/Dysfunction with Movement   Flexion   70%    Extension 10% Decreased thoracic spine extension   Right side bending   30%    Left side bending   30%    Right rotation 45% Rotation coupled with right SB   Left rotation 45% Rotation couple with left SB        Treatment      Linn received therapeutic exercises to develop strength, endurance, ROM, flexibility, posture, and core stabilization for 20 minutes including:    Upper trap stretch with upper extremity support and overpressure 3x30s both sides  Levator scapulae stretch with upper extremity support and overpressure 3x30s both sides  Prone on elbows chin tuck 20x 3 sec hold - NP  B ER + cervical rotation at wall RTB x15 each side  Seated chin tuck 3 sec hold x20  Open book 5" hold B " "x15 ea - added YT resistance today  Seated cervical extension w/ towel 10x 10 sec hold    NT:  Seated B cervical rotation w/towel 5 sec hold 2x10 ea  Serratus wall slides with arms in pillowcase X 20  Seated horizontal abduction  Standing RTB shld ext 3x10/3"     Linn received the following manual therapy techniques: to the: C/S for 15 minutes, including:    CPA and UPA thoracic mobs, prone today C5-T5  Contract Relax upper trap   Seated MWM into R & L rotational glides with UPA grade 2-3  Seated UPA C5-T2    NT:  Prone CTJ MWM chin tuck + chin tuck with rotation    Linn participated in neuromuscular re-education activities to improve: Balance, Coordination, Kinesthetic, Sense, Proprioception, and Posture for 00 minutes. The following activities were included:      Linn participated in dynamic functional therapeutic activities to improve functional performance for 00 minutes, including:  UBE 4'/4' pulling/pushing  - not today   Jewels shld flexion 5'       Home Exercises Provided and Patient Education Provided     Education provided:   - Compliance with HEP     Written Home Exercises Provided: yes.  Exercises were reviewed and Linn was able to demonstrate them prior to the end of the session.  Linn demonstrated good  understanding of the education provided.     Assessment   Patient continues to show good improvement in range of motion into rotational movements but remains restricted in upper thoracic and lower cervical spine joint mobility. Functionally, she is showing good carryover and is having a good improvement in midscapular strength and is able to perform all activity with good control. Continue as scheduled and look to DC at end of scheduled visits      Pt prognosis is Good.   Pt will continue to benefit from skilled outpatient physical therapy to address the deficits listed in the problem list box on initial evaluation, provide pt/family education and to maximize pt's level of independence in the " home and community environment.     Pt's spiritual, cultural and educational needs considered and pt agreeable to plan of care and goals.    Anticipated barriers to physical therapy: none     Goals: Goals:  Short Term Goals: 4-6 weeks  1. Pt will be compliant with HEP 50% of prescribed amount.   MET  2. The pt to demo improvement in pain free cervical left rotation by 10% of full ROM.  Progressing  3.  Report decreased neck pain  <   / =  2 /10  to increase tolerance for adls, work.  MET  4. Pt will demo improvement in midline cervical alignment in sitting to improve posture.  Progressing     Long Term Goals: 8-10 weeks   Pt will be compliant with % of prescribed amount.  Progressing  Patient will improve their FOTO limitation score to at least 36% as evidence of clinically significant improvements in their function.  Progressing  Pt will demo improvement in 20% of cervical left rotation to equal R cervical rotation.  Progressing  The pt will report full participation in ADLs and IADLs without restrictions related to neck pain.  Progressing        Plan     Continue per established POC     Marcella Wilcox, PT

## 2023-09-28 ENCOUNTER — PATIENT MESSAGE (OUTPATIENT)
Dept: PRIMARY CARE CLINIC | Facility: CLINIC | Age: 63
End: 2023-09-28
Payer: MEDICARE

## 2023-09-29 ENCOUNTER — OFFICE VISIT (OUTPATIENT)
Dept: ALLERGY | Facility: CLINIC | Age: 63
End: 2023-09-29
Payer: MEDICARE

## 2023-09-29 VITALS — WEIGHT: 198.19 LBS | HEIGHT: 65 IN | BODY MASS INDEX: 33.02 KG/M2

## 2023-09-29 DIAGNOSIS — L50.8 CHRONIC URTICARIA: Primary | ICD-10-CM

## 2023-09-29 DIAGNOSIS — T78.3XXS ANGIOEDEMA, SEQUELA: ICD-10-CM

## 2023-09-29 PROCEDURE — 3061F NEG MICROALBUMINURIA REV: CPT | Mod: HCNC,CPTII,S$GLB, | Performed by: ALLERGY & IMMUNOLOGY

## 2023-09-29 PROCEDURE — 1159F MED LIST DOCD IN RCRD: CPT | Mod: HCNC,CPTII,S$GLB, | Performed by: ALLERGY & IMMUNOLOGY

## 2023-09-29 PROCEDURE — 3066F NEPHROPATHY DOC TX: CPT | Mod: HCNC,CPTII,S$GLB, | Performed by: ALLERGY & IMMUNOLOGY

## 2023-09-29 PROCEDURE — 99999 PR PBB SHADOW E&M-EST. PATIENT-LVL IV: CPT | Mod: PBBFAC,HCNC,, | Performed by: ALLERGY & IMMUNOLOGY

## 2023-09-29 PROCEDURE — 99215 OFFICE O/P EST HI 40 MIN: CPT | Mod: HCNC,S$GLB,, | Performed by: ALLERGY & IMMUNOLOGY

## 2023-09-29 PROCEDURE — 1160F PR REVIEW ALL MEDS BY PRESCRIBER/CLIN PHARMACIST DOCUMENTED: ICD-10-PCS | Mod: HCNC,CPTII,S$GLB, | Performed by: ALLERGY & IMMUNOLOGY

## 2023-09-29 PROCEDURE — 3046F HEMOGLOBIN A1C LEVEL >9.0%: CPT | Mod: HCNC,CPTII,S$GLB, | Performed by: ALLERGY & IMMUNOLOGY

## 2023-09-29 PROCEDURE — 1159F PR MEDICATION LIST DOCUMENTED IN MEDICAL RECORD: ICD-10-PCS | Mod: HCNC,CPTII,S$GLB, | Performed by: ALLERGY & IMMUNOLOGY

## 2023-09-29 PROCEDURE — 99215 PR OFFICE/OUTPT VISIT, EST, LEVL V, 40-54 MIN: ICD-10-PCS | Mod: HCNC,S$GLB,, | Performed by: ALLERGY & IMMUNOLOGY

## 2023-09-29 PROCEDURE — 99999 PR PBB SHADOW E&M-EST. PATIENT-LVL IV: ICD-10-PCS | Mod: PBBFAC,HCNC,, | Performed by: ALLERGY & IMMUNOLOGY

## 2023-09-29 PROCEDURE — 3008F PR BODY MASS INDEX (BMI) DOCUMENTED: ICD-10-PCS | Mod: HCNC,CPTII,S$GLB, | Performed by: ALLERGY & IMMUNOLOGY

## 2023-09-29 PROCEDURE — 4010F PR ACE/ARB THEARPY RXD/TAKEN: ICD-10-PCS | Mod: HCNC,CPTII,S$GLB, | Performed by: ALLERGY & IMMUNOLOGY

## 2023-09-29 PROCEDURE — 1160F RVW MEDS BY RX/DR IN RCRD: CPT | Mod: HCNC,CPTII,S$GLB, | Performed by: ALLERGY & IMMUNOLOGY

## 2023-09-29 PROCEDURE — 3061F PR NEG MICROALBUMINURIA RESULT DOCUMENTED/REVIEW: ICD-10-PCS | Mod: HCNC,CPTII,S$GLB, | Performed by: ALLERGY & IMMUNOLOGY

## 2023-09-29 PROCEDURE — 3008F BODY MASS INDEX DOCD: CPT | Mod: HCNC,CPTII,S$GLB, | Performed by: ALLERGY & IMMUNOLOGY

## 2023-09-29 PROCEDURE — 4010F ACE/ARB THERAPY RXD/TAKEN: CPT | Mod: HCNC,CPTII,S$GLB, | Performed by: ALLERGY & IMMUNOLOGY

## 2023-09-29 PROCEDURE — 3046F PR MOST RECENT HEMOGLOBIN A1C LEVEL > 9.0%: ICD-10-PCS | Mod: HCNC,CPTII,S$GLB, | Performed by: ALLERGY & IMMUNOLOGY

## 2023-09-29 PROCEDURE — 3066F PR DOCUMENTATION OF TREATMENT FOR NEPHROPATHY: ICD-10-PCS | Mod: HCNC,CPTII,S$GLB, | Performed by: ALLERGY & IMMUNOLOGY

## 2023-09-29 NOTE — PROGRESS NOTES
Subjective:       Patient ID: Linn Price is a 63 y.o. female.    Chief Complaint:  Allergies      64 yo woman presents for new patient evaluation of hives and face swelling. She has seen Dr Lantigua in past but new to me. She states he has red bumps, not really itchy. Break out mostly on upper body. Mostly there when wake in AM. Last hours and then go away, no bruise, no scar. Been going on for many months or years but more frequent now. Wonders is shrimp or seasoning or asparagus are triggers. She has had a few times of lip, cheek swelling with or without hives. No time of year worse. No SOB. No rhinitis. No asthma. No known insect or latex allergy.         Environmental History: see history section for home environment  Review of Systems   HENT:  Positive for facial swelling. Negative for congestion, postnasal drip, rhinorrhea and sneezing.    Respiratory:  Negative for cough, chest tightness, shortness of breath and wheezing.    Skin:  Positive for color change and rash.        Objective:      Physical Exam  Vitals and nursing note reviewed.   Constitutional:       General: She is not in acute distress.     Appearance: Normal appearance. She is not ill-appearing.   HENT:      Nose: No rhinorrhea.   Eyes:      General:         Right eye: No discharge.         Left eye: No discharge.      Conjunctiva/sclera: Conjunctivae normal.   Pulmonary:      Effort: Pulmonary effort is normal. No respiratory distress.   Abdominal:      General: There is no distension.   Skin:     General: Skin is warm and dry.      Findings: No erythema or rash.   Neurological:      Mental Status: She is alert and oriented to person, place, and time.   Psychiatric:         Mood and Affect: Mood normal.         Behavior: Behavior normal.         Laboratory:   none performed   Assessment:       1. Chronic urticaria    2. Angioedema, sequela         Plan:       Advised pt chronic urticaria and angioedema can be allergic vs systemic vs  immune mediated, advised likely latter but will send labs for immunocaps to eval  Start cetirizine 20 mg at night, if not controlled then will increase to BID  Phone review    I spent a total of 40 minutes on the day of the visit.  This includes face to face time and non-face to face time preparing to see the patient (eg, review of tests), obtaining and/or reviewing separately obtained history, documenting clinical information in the electronic or other health record, independently interpreting results and communicating results to the patient/family/caregiver, or care coordinator.

## 2023-10-02 ENCOUNTER — CLINICAL SUPPORT (OUTPATIENT)
Dept: DIABETES | Facility: CLINIC | Age: 63
End: 2023-10-02
Payer: MEDICARE

## 2023-10-02 DIAGNOSIS — E11.65 UNCONTROLLED TYPE 2 DIABETES MELLITUS WITH HYPERGLYCEMIA, WITHOUT LONG-TERM CURRENT USE OF INSULIN: ICD-10-CM

## 2023-10-02 PROCEDURE — 99999 PR PBB SHADOW E&M-EST. PATIENT-LVL I: CPT | Mod: PBBFAC,,, | Performed by: DIETITIAN, REGISTERED

## 2023-10-02 PROCEDURE — 99999 PR PBB SHADOW E&M-EST. PATIENT-LVL I: ICD-10-PCS | Mod: PBBFAC,,, | Performed by: DIETITIAN, REGISTERED

## 2023-10-02 PROCEDURE — G0108 DIAB MANAGE TRN  PER INDIV: HCPCS | Mod: S$GLB,,, | Performed by: DIETITIAN, REGISTERED

## 2023-10-02 PROCEDURE — G0108 PR DIAB MANAGE TRN  PER INDIV: ICD-10-PCS | Mod: S$GLB,,, | Performed by: DIETITIAN, REGISTERED

## 2023-10-02 NOTE — PROGRESS NOTES
Diabetes Care Specialist Progress Note  Author: Alisa Petty RD, CDE  Date: 10/2/2023    Program Intake  Reason for Diabetes Program Visit:: Initial Diabetes Assessment  Current diabetes risk level:: high  In the last 12 months, have you:: none    Lab Results   Component Value Date    HGBA1C 12.4 (H) 06/14/2023       Clinical    Problem Review  Reviewed Problem List with Patient: yes  Active comorbidities affecting diabetes self-care.: yes  Comorbidities: Hypertension  Reviewed health maintenance: yes    Clinical Assessment  Current Diabetes Treatment: Oral Medication, Injectable  Have you ever experienced hypoglycemia (low blood sugar)?: no  Have you ever experienced hyperglycemia (high blood sugar)?: yes  Are you able to tell when your blood sugar is high?: No (comment)    Medication Information  How do you obtain your medications?: Patient drives  How many days a week do you miss your medications?: 3 or more (Had been off the Ozempic for a few weeks due to colonoscopy)  Do you sometimes have difficulty refilling your medications?: No  Medication adherence impacting ability to self-manage diabetes?: No    Labs  Do you have regular lab work to monitor your medications?: Yes    Nutritional Status  Diet: Regular (3 meals plus snacks; drinks water, coffee/tea, milk)  Change in appetite?: No  Dentation:: Intact  Recent Changes in Weight: No Recent Weight Change  Current nutritional status an area of need that is impacting patient's ability to self-manage diabetes?: No    Additional Social History    Support  Does anyone support you with your diabetes care?: yes  Who supports you?: friend, self  Who takes you to your medical appointments?: self  Does the current support meet the patient's needs?: Yes  Is Support an area impacting ability to self-manage diabetes?: No    Access to Mass Media & Technology  Does the patient have access to any of the following devices or technologies?: Smart phone  Media or technology needs  impacting ability to self-manage diabetes?: No    Cognitive/Behavioral Health  Alert and Oriented: Yes  Requires Prompting: No  Requires assistance for routine expression?: No  Cognitive or behavioral barriers impacting ability to self-manage diabetes?: No    Culture/Anglican  Culture or Caodaism beliefs that may impact ability to access healthcare: No    Communication  Language preference: English  Hearing Problems: No  Vision Problems: No  Communication needs impacting ability to self-manage diabetes?: No    Health Literacy  Preferred Learning Method: Face to Face  How often do you need to have someone help you read instructions, pamphlets, or written material from your doctor or pharmacy?: Never  Health literacy needs impacting ability to self-manage diabetes?: No      Diabetes Self-Management Skills Assessment    Diabetes Disease Process/Treatment Options  Patient/caregiver able to state what happens when someone has diabetes.: somewhat  Patient/caregiver knows what type of diabetes they have.: yes  Diabetes Type : Type II  Patient/caregiver able to identify at least three signs and symptoms of diabetes.: no  Patient able to identify at least three risk factors for diabetes.: no  Diabetes Disease Process/Treatment Options: Skills Assessment Completed: Yes  Assessment indicates:: Instruction Needed  Area of need?: Yes    Nutrition/Healthy Eating  Challenges to healthy eating:: eating out, going to parties  Method of carbohydrate measurement:: no method  Patient can identify foods that impact blood sugar.: yes  Patient-identified foods:: sweets, fruit/fruit juice, soda, starches (bread, pasta, rice, cereal)  Nutrition/Healthy Eating Skills Assessment Completed:: Yes  Assessment indicates:: Instruction Needed  Area of need?: Yes    Physical Activity/Exercise  Patient's daily activity level:: moderately active  Patient formally exercises outside of work.: yes  Exercise Type: walking  Frequency: four or more times a  week  Duration: 30 min  Patient can identify forms of physical activity.: yes  Stated forms of physical activity:: any movement performed by muscles that uses energy  Patient can identify reasons why exercise/physical activity is important in diabetes management.: yes  Identified reasons:: lowers blood glucose, blood pressure, and cholesterol  Physical Activity/Exercise Skills Assessment Completed: : Yes  Assessment indicates:: Adequate understanding  Area of need?: No    Medications  Patient is able to describe current diabetes management routine.: yes  Diabetes management routine:: injectable medications, oral medications  Patient is able to identify current diabetes medications, dosages, and appropriate timing of medications.: yes  Patient understands the purpose of the medications taken for diabetes.: yes  Patient reports problems or concerns with current medication regimen.: no  Medication Skills Assessment Completed:: Yes  Assessment indicates:: Adequate understanding  Area of need?: No    Home Blood Glucose Monitoring  Patient states that blood sugar is checked at home daily.: yes  Monitoring Method:: home glucometer  How often do you check your blood sugar?: Once a day  Home Blood Glucose Monitoring Skills Assessment Completed: : Yes  Assessment indicates:: Adequate understanding  Area of need?: No    Acute Complications  Patient is able to identify types of acute complications: No  Acute Complications Skills Assessment Completed: : Yes  Assessment indicates:: Instruction Needed  Area of need?: Yes    Chronic Complications  Patient can identify major chronic complications of diabetes.: yes  Stated chronic complications:: heart disease/heart attack, kidney disease, retinopathy  Patient can identify ways to prevent or delay diabetes complications.: yes  Stated ways to prevent complications:: controlling blood sugar  Patient is taking statin?: Yes  Chronic Complications Skills Assessment Completed: :  Yes  Assessment indicates:: Adequate understanding  Area of need?: No    Psychosocial/Coping  Patient can identify ways of coping with chronic disease.: yes  Patient-stated ways of coping with chronic disease:: support from loved ones  Psychosocial/Coping Skills Assessment Completed: : Yes  Assessment indicates:: Adequate understanding  Area of need?: No      Assessment Summary and Plan    Based on today's diabetes care assessment, the following areas of need were identified:          10/2/2023    12:02 AM   Social   Support No   Access to Mass Media/Tech No   Cognitive/Behavioral Health No   Culture/Congregation No   Communication No   Health Literacy No            10/2/2023    12:02 AM   Clinical   Medication Adherence No   Nutritional Status No            10/2/2023    12:02 AM   Diabetes Self-Management Skills   Diabetes Disease Process/Treatment Options Yes - reviewed diabetes disease process and treatment options   Nutrition/Healthy Eating Yes - reviewed CHO counting, label reading and addt'l resources to assist w/ CHO counting; plate method reviewed; alternatives to sugary beverages discussed   Physical Activity/Exercise No   Medication No   Home Blood Glucose Monitoring No   Acute Complications Yes - reviewed s/s and treatment of hypo/hyperglycemia   Chronic Complications No   Psychosocial/Coping No          Today's interventions were provided through individual discussion, instruction, and written materials were provided.      Patient verbalized understanding of instruction and written materials.  Pt was able to return back demonstration of instructions today. Patient understood key points, needs reinforcement and further instruction.     Diabetes Self-Management Care Plan:    Today's Diabetes Self-Management Care Plan was developed with Linn's input. Linn has agreed to work toward the following goal(s) to improve his/her overall diabetes control.      Care Plan: Diabetes Management   Updates made since  9/2/2023 12:00 AM        Problem: Medications         Long-Range Goal: Patient Agrees to take Diabetes Medication(s) as prescribed.    Start Date: 10/2/2023   Expected End Date: 4/2/2024   Priority: High   Barriers: No Barriers Identified        Task: Reviewed with patient all current diabetes medications and provided basic review of the purpose, dosage, frequency, side effects, and storage of both oral and injectable diabetes medications. Completed 10/2/2023     Follow Up Plan     Follow up in about 6 months (around 4/2/2024).    Today's care plan and follow up schedule was discussed with patient.  Linn verbalized understanding of the care plan, goals, and agrees to follow up plan.        The patient was encouraged to communicate with his/her health care provider/physician and care team regarding his/her condition(s) and treatment.  I provided the patient with my contact information today and encouraged to contact me via phone or [facility] Patient Portal as needed.     Length of Visit   Total Time: 60 Minutes

## 2023-10-04 ENCOUNTER — CLINICAL SUPPORT (OUTPATIENT)
Dept: REHABILITATION | Facility: HOSPITAL | Age: 63
End: 2023-10-04
Payer: MEDICARE

## 2023-10-04 ENCOUNTER — PATIENT MESSAGE (OUTPATIENT)
Dept: OTHER | Facility: OTHER | Age: 63
End: 2023-10-04
Payer: MEDICARE

## 2023-10-04 DIAGNOSIS — R29.898 DECREASED STRENGTH OF UPPER EXTREMITY: ICD-10-CM

## 2023-10-04 DIAGNOSIS — R29.3 POSTURE ABNORMALITY: Primary | ICD-10-CM

## 2023-10-04 DIAGNOSIS — M53.82 IMPAIRED RANGE OF MOTION OF CERVICAL SPINE: ICD-10-CM

## 2023-10-04 PROCEDURE — 97110 THERAPEUTIC EXERCISES: CPT

## 2023-10-04 PROCEDURE — 97140 MANUAL THERAPY 1/> REGIONS: CPT

## 2023-10-06 ENCOUNTER — LAB VISIT (OUTPATIENT)
Dept: LAB | Facility: HOSPITAL | Age: 63
End: 2023-10-06
Attending: INTERNAL MEDICINE
Payer: MEDICARE

## 2023-10-06 DIAGNOSIS — L50.8 CHRONIC URTICARIA: ICD-10-CM

## 2023-10-06 DIAGNOSIS — E11.9 TYPE 2 DIABETES MELLITUS WITHOUT COMPLICATION, WITHOUT LONG-TERM CURRENT USE OF INSULIN: ICD-10-CM

## 2023-10-06 DIAGNOSIS — T78.3XXS ANGIOEDEMA, SEQUELA: ICD-10-CM

## 2023-10-06 LAB
ALBUMIN SERPL BCP-MCNC: 3.8 G/DL (ref 3.5–5.2)
ALP SERPL-CCNC: 96 U/L (ref 55–135)
ALT SERPL W/O P-5'-P-CCNC: 13 U/L (ref 10–44)
ANION GAP SERPL CALC-SCNC: 8 MMOL/L (ref 8–16)
AST SERPL-CCNC: 16 U/L (ref 10–40)
BILIRUB SERPL-MCNC: 0.4 MG/DL (ref 0.1–1)
BUN SERPL-MCNC: 15 MG/DL (ref 8–23)
CALCIUM SERPL-MCNC: 9.5 MG/DL (ref 8.7–10.5)
CHLORIDE SERPL-SCNC: 104 MMOL/L (ref 95–110)
CO2 SERPL-SCNC: 28 MMOL/L (ref 23–29)
CREAT SERPL-MCNC: 0.8 MG/DL (ref 0.5–1.4)
EST. GFR  (NO RACE VARIABLE): >60 ML/MIN/1.73 M^2
ESTIMATED AVG GLUCOSE: 143 MG/DL (ref 68–131)
GLUCOSE SERPL-MCNC: 121 MG/DL (ref 70–110)
HBA1C MFR BLD: 6.6 % (ref 4–5.6)
POTASSIUM SERPL-SCNC: 4.2 MMOL/L (ref 3.5–5.1)
PROT SERPL-MCNC: 7.8 G/DL (ref 6–8.4)
SODIUM SERPL-SCNC: 140 MMOL/L (ref 136–145)

## 2023-10-06 PROCEDURE — 86003 ALLG SPEC IGE CRUDE XTRC EA: CPT | Mod: 59,HCNC | Performed by: ALLERGY & IMMUNOLOGY

## 2023-10-06 PROCEDURE — 80053 COMPREHEN METABOLIC PANEL: CPT | Mod: HCNC | Performed by: INTERNAL MEDICINE

## 2023-10-06 PROCEDURE — 83036 HEMOGLOBIN GLYCOSYLATED A1C: CPT | Mod: HCNC | Performed by: INTERNAL MEDICINE

## 2023-10-06 PROCEDURE — 88184 FLOWCYTOMETRY/ TC 1 MARKER: CPT | Mod: HCNC | Performed by: ALLERGY & IMMUNOLOGY

## 2023-10-06 PROCEDURE — 86003 ALLG SPEC IGE CRUDE XTRC EA: CPT | Mod: HCNC | Performed by: ALLERGY & IMMUNOLOGY

## 2023-10-06 PROCEDURE — 36415 COLL VENOUS BLD VENIPUNCTURE: CPT | Performed by: INTERNAL MEDICINE

## 2023-10-08 NOTE — PROGRESS NOTES
"[facility] Note    Chief Complaint      Chief Complaint   Patient presents with    Diabetes    Facial Swelling     Seasonal allergies       History of Present Illness      Linn Price is a 63 y.o.  WF with HTN, DM - II, JANAE on CPAP, Chronic Constipation, GERD, Colon polyps,Pulm nodule, Noncompliance, and Nicotine Dependence in Remission presents to fu well visit and chronic issues. Last virtual visit - 7/20/23    Chronic Urticaria/Angioedema - UC - 8/16/23 - Allergic Rx/lip swelling - Rx Benadryl and allegra. Fu by A/I, Dr. Cooper. Pt on Zyrtec BID. She just had allergy testing.  She was started on Lisinopril in June 2023.  Sx's preceded this medication. No trouble breathign or swallowing. Will check thyroid Ab's.  Is she a candidate for Xolair? Will stop Lisinopril due to chronic Hives. She prefers to check her BP and let me know if it rises rather than having me start a new med.        Noncompliance -   Per digital med program note - 12/7/22 "Not taking HTN medicines." Not measuring BP, reports that she has been too busy". Per  cards note, 6/21/23 - "Hasn't been taking any meds recently ". Pt reports she is no longer missing her meds.     Pulm nodules - CT lung Ca screen - 6/23/23 - A Stable 7 mm pleural base nodule right middle lobe.  Stable tiny scattered nodules measuring up to 3 mm in the right lung. Minimal atherosclerosis (plaque in the arteries). Age-appropriate degenerative changes (Arthritis). Hepatic steatosis (Fatty Liver) and hepatomegaly (slight liver enlargement likely due to your fatty Liver). We  will continue annual screening with repeat Low dose CT in 1 year.      Thyroid nodule - Thyroid U/S - 5/25/21- small (9mm) nodule that does not require further intervention of follow up.     COPD - Pt with prev PFT's at  - + Obstruction. Cont prn albuterol - has not needed.  +SOB with house work.       Nicotine Dependence -in remission - Pt quit 1/12/20. Congratulated on " cessation.      HTN - She saw Dr. Jewel Piña.  Recent CT showed - A small pericardial effusion is present.  Started amlodipine 2.5 mg/d which she stopped because she thought it was worsening her Skin condition. Stopping it did not change the skin issues.  She is fu by digital BP monitoring program.   Cont to monitor.   Pt fu A/I carnes for recent food allergies with lip swelling first. Pt would like a fluid pill to help with intermittent edema. She will monitor BP closely and alert me for any concerns.  Pt off HCTZ 12.5 mg daily prn edema. Saw Dr. Holloway and was started on Lisinopril and crestor (she did not start Crestor). Has fu in sept.  She will discuss her recent recs from Dr. Asher with Dr. Holloway. She reports being told she is a candidate for ablation.  Echo - 6/23/23 - mild concentric hypertrophy and normal systolic function. EF 65%; Grade I Diastolic dysf. Mild aortic regurg. Exercise Stress Test - 6/26/23  - neg.     Left renal cyst - 1.8 cm simple parapelvic cyst. Lower pole Left kidney. Reassured.      DM - II - Ha1c is controlled at 6.6 on 10/6/23  down from prev 12.4.    Cont Metformin  mg 2 tabs po QHS and Ozempic 0.5 mg/wk. She had not bumped Metformin to 3 tabs so will cont 2 tabs for now as we had inc Ozempic to 0.5 mg/wk. No evidence of microalbuminuria. Glc has improved to 147 fasting this AM, 111 before lunch today,  and after lunch 135 today. She reports some constipation.  Rec inc hydration and try stool softeners or Miralax prn. Has a BM Q3-4 days.  Has fu with Endo, Dr. Coy, in Dec.      JANAE on CPAP - She uses CPAP it nightly x 6-7 hrs. Rec low carb diet and exercise for wt loss. Doing well.      Chronic constipation - Doing well at present.  Rec adeq hydration.   Rec adeq hydration and Miralax prn.      Colon polyps - Fu by Dr. Swain/Magen.       Hemorrhoids - Fu by Dr. Mccann.  Cont conservative Mgmnt.      GERD - Rare. Rec reflux prec. Can take Pepcid prn-rarely needs it.      Vit D  def - 30 up from 14- Pt completed Ergocalciferol once weekly. Pt currently on Vit D3 2000 u/d.       Dishydrotic Eczema- Dx via Bx. Prev fu by Derm - Dr. Sky. Doing better.  She is on light therapy. She can walk and use shoes now.  She is also on topicals.      Chronic Otitis Externa - Fu by ENT, Dr. David.  Pt on Acetic acid drops prn.      Obesity - BMI - 33.68-down 8 lb since last visit. Rec low carb diet and exercising.  Plans to resume exercise     HLD - Pt has not started her Crestor that cards started. She declines. I asked her to reconsider. Atherosclerosis including coronary arteries.  She is on Aspirin 81mg. She declines statin for now.  Her cholesterol was not quite controlled Her goal LDL is < 70. Cont to monitor.        Normocytic anemia - H/H - 13/41 - cont to monitor. Planning for  C-scope     Neck pain x 6-8 mos- She is in Ptx for her neck. C/o stiffness. No numbness/tingling/weakness. + DDD. Fu by [facility].      Lab review:   5/22/20 - WBC - 7.6;  H/H - 12.1/36.3; Plt - 338;  BUN/Cr - 12/0.7; LFt's - wnl;   TG 80; Ha1c - 6.9; TSH - 1.52;  Vit D -14      HCM - Flu - none - refuses;  Tdap -2/1/22;  PCV 13 - none - declines;  PVX 23 - none - declines;  Shingrix - none - declines;  COVID -19 vaccine #1 - declines; MGM - 6/7/23  -repeat 1 yr;   DEXA - 8/15/23 - wnl;  PAP -7/6/21 - neg; Hep C Screen - 6/20/22 - neg;  HIV Screen - none - defers; C-scope - 9/5/23 -polyps and hemorrhoids -  repeat 3 yrs - due;  Prev PCP - me at [facility] and then Dr. Armenta; Prev OB/GYN - Dr. Catalan;  GI - Dr. Swain; A/I - Dr. Cordova;  ENT- Dr. David; Derm - Dr. Downey; Ophtho - Dr. Shin; Rheum - Dr. Campoverde; Cards - ? At [facility]; well visit - 1/21/22    Patient Care Team:  Erika Mcclure MD as PCP - General (Internal Medicine)  Brent Wilson MD as Obstetrician (Obstetrics)  Santana Swain MD as Consulting Physician (Gastroenterology)  Daniel Bynum MD as Consulting Physician (General  Surgery)  Eugene Fontana Jr., MD as Consulting Physician (Otolaryngology)  Chong Tang MD as Consulting Physician (Ophthalmology)  Melissa Austin MA as Care Coordinator  Julianne Narvaez as Digital Medicine Health   Charlotte Diaz, PharmD as Hypertension Digital Medicine Clinician  Erika Mcclure MD as Hypertension Digital Medicine Responsible Provider (Internal Medicine)  Charlotte Diaz PharmD as Diabetes Digital Medicine Clinician  Erika Mcclure MD as Diabetes Digital Medicine Responsible Provider (Internal Medicine)  Medicare, [employer] Managed as Hypertension Digital Medicine Contract  Medicare, [employer] Managed as Diabetes Digital Medicine Contract     Health Maintenance:  Immunization History   Administered Date(s) Administered    Tetanus 02/01/2022      Health Maintenance   Topic Date Due    Aspirin/Antiplatelet Therapy  Never done    Hemoglobin A1c  04/06/2024    Foot Exam  05/16/2024    Mammogram  06/07/2024    Lipid Panel  06/14/2024    LDCT Lung Screen  06/23/2024    Eye Exam  07/19/2024    High Dose Statin  10/10/2024    DEXA Scan  08/15/2025    Colorectal Cancer Screening  09/05/2026    TETANUS VACCINE  02/01/2032    Hepatitis C Screening  Completed    Shingles Vaccine  Discontinued        Past Medical History:  Past Medical History:   Diagnosis Date    Allergy     Benign hypertension 08/19/2014    Broken fingers     Broken toe     Colon polyps 08/19/2014    Diabetes mellitus     Eczema     Elevated fasting glucose 08/19/2014    Family history of bladder cancer     Family history of heart disease 08/19/2014    History of broken nose     Menopause 2009    JANAE on CPAP 11/15/2007    Per PSG: Rx'd CPAP, pressure 13, using small Comfort Select mask or interface of patient's choice, chin strap, and heated humidifier      Pulmonary nodule 12/01/2020    Pyloric stenosis     as a child    Reflux esophagitis 08/19/2014    Per EGD 8/9/2006      Tobacco abuse     Ulcer     at 14 y.o.     Urticaria        Past Surgical History:   has a past surgical history that includes Gastric restriction surgery; [place] tooth extraction; Upper gastrointestinal endoscopy (2006); Colonoscopy (2018); and Tonsillectomy.    Family History:  family history includes Bladder Cancer in her maternal uncle; COPD in her sister; Dementia in her mother; Diabetes in her father and sister; Hypertension in her brother, mother, and sister; Kidney disease in her mother; Macular degeneration in her mother; Prostate cancer in her father; Psoriasis in her mother; Stroke (age of onset: 60) in her mother; Stroke (age of onset: 75) in her father.     Social History:  Social History     Tobacco Use    Smoking status: Former     Current packs/day: 0.00     Average packs/day: 1.5 packs/day for 46.8 years (70.2 ttl pk-yrs)     Types: Cigarettes     Start date: 3/17/1973     Quit date: 1/12/2020     Years since quitting: 3.7     Passive exposure: Past    Smokeless tobacco: Never   Substance Use Topics    Alcohol use: Yes     Comment: social - rare    Drug use: Never       Review of Systems   Constitutional:  Positive for diaphoresis. Negative for chills and fever.   HENT:  Negative for hearing loss.    Eyes:  Negative for visual disturbance.   Respiratory:  Negative for chest tightness and shortness of breath.    Cardiovascular:  Negative for chest pain and palpitations.   Gastrointestinal:  Positive for constipation. Negative for abdominal pain, blood in stool, diarrhea, nausea and vomiting.   Endocrine: Negative for cold intolerance, heat intolerance and polydipsia.   Genitourinary:  Negative for dysuria and frequency.   Neurological:  Negative for dizziness and headaches.   Psychiatric/Behavioral:  Negative for dysphoric mood. The patient is not nervous/anxious.         Medications:    Current Outpatient Medications:     ascorbic acid, vitamin C, (VITAMIN C) 100 MG tablet, Take 100 mg by mouth once daily., Disp: , Rfl:     blood sugar  diagnostic (ONETOUCH ULTRA TEST) Strp, USE TO TEST BLOOD GLUCOSE ONCE DAILY, Disp: 100 strip, Rfl: 1    blood sugar diagnostic Strp, To check BG 2 times daily, to use with insurance preferred meter, Disp: 200 strip, Rfl: 3    blood sugar diagnostic Strp, 1 each by Misc.(Non-Drug; Combo Route) route 2 (two) times a day., Disp: 200 each, Rfl: 3    clobetasol 0.05% (TEMOVATE) 0.05 % Oint, AAA on hands and feet BID  x 1-2 wks then prn flares only, Disp: 60 g, Rfl: 3    coenzyme Q10 (COQ-10) 100 mg capsule, Take 2 once daily, Disp: 1 capsule, Rfl: 0    diphenhydrAMINE (BENADRYL) 50 MG capsule, Take 1 capsule (50 mg total) by mouth every 6 (six) hours as needed for Itching or Allergies., Disp: 30 capsule, Rfl: 0    fexofenadine (ALLEGRA) 180 MG tablet, Take 1 tablet (180 mg total) by mouth once daily., Disp: 10 tablet, Rfl: 0    lancets (ONETOUCH DELICA PLUS LANCET) 30 gauge Misc, TO CHECK BLOOD GLUCOSE ONCE DAILY, Disp: 100 each, Rfl: 1    lancets Misc, To check BG 2 times daily, to use with insurance preferred meter, Disp: 200 each, Rfl: 2    metFORMIN (GLUCOPHAGE-XR) 500 MG ER 24hr tablet, TAKE 2 TABLETS BY MOUTH EVERY NIGHT AT BEDTIME, Disp: 180 tablet, Rfl: 1    multivitamin capsule, Take 1 capsule by mouth once daily., Disp: , Rfl:     ONETOUCH DELICA PLUS LANCET 30 gauge Misc, TO CHECK BLOOD GLUCOSE ONCE DAILY, Disp: , Rfl:     ONETOUCH ULTRA2 METER Misc, USE TO CHECK BLOOD GLUCOSE ONCE DAILY, Disp: , Rfl:     rosuvastatin (CRESTOR) 5 MG tablet, Take 1 tablet (5 mg total) by mouth once daily., Disp: 90 tablet, Rfl: 3    semaglutide (OZEMPIC) 0.25 mg or 0.5 mg (2 mg/3 mL) pen injector, Inject 0.5 mg into the skin every 7 days, Disp: 3 mL, Rfl: 1    sulfacetamide sodium 10% (BLEPH-10) 10 % ophthalmic solution, 1 drop as needed., Disp: , Rfl:     tiZANidine (ZANAFLEX) 4 MG tablet, Take 1 tablet (4 mg total) by mouth every 8 (eight) hours as needed (muscle spasms)., Disp: 60 tablet, Rfl: 2    triamcinolone acetonide  "0.1% (KENALOG) 0.1 % cream, Apply topically as needed., Disp: , Rfl:     vitamin D (VITAMIN D3) 1000 units Tab, Take 1,000 Units by mouth once daily., Disp: , Rfl:     albuterol (PROVENTIL/VENTOLIN HFA) 90 mcg/actuation inhaler, INHALE 2 PUFFS BY MOUTH EVERY 6 HOURS AS NEEDED FOR WHEEZING. USE WITH SPACER, Disp: 54 g, Rfl: 1    aspirin 81 MG Chew, Take 1 tablet (81 mg total) by mouth once daily., Disp: , Rfl: 0    calcipotriene (DOVONOX) 0.005 % cream, Apply topically 2 (two) times daily. To affected areas on hands and feet., Disp: 120 g, Rfl: 3    EPINEPHrine (EPIPEN) 0.3 mg/0.3 mL AtIn, Inject 0.3 mLs (0.3 mg total) into the muscle once. for 1 dose, Disp: 2 each, Rfl: 0     Allergies:  Review of patient's allergies indicates:   Allergen Reactions    Asparagus Anxiety, Dermatitis, Hives, Itching, Rash and Swelling    Codeine Other (See Comments)     psychosis       Physical Exam      Vital Signs  Temp: 97.9 °F (36.6 °C)  Pulse: 74  Resp: 20  SpO2: 98 %  BP: 122/68  BP Location: Left arm  Pain Score:   1  Height and Weight  Height: 5' 5" (165.1 cm)  Weight: 91.8 kg (202 lb 6.1 oz)  BSA (Calculated - sq m): 2.05 sq meters  BMI (Calculated): 33.7  Weight in (lb) to have BMI = 25: 149.9             Physical Exam  Vitals reviewed.   Constitutional:       General: She is not in acute distress.     Appearance: Normal appearance. She is not ill-appearing, toxic-appearing or diaphoretic.   HENT:      Head: Normocephalic and atraumatic.      Comments: Right facial swelling     Right Ear: Tympanic membrane normal.      Mouth/Throat:      Mouth: Mucous membranes are moist.   Eyes:      Extraocular Movements: Extraocular movements intact.      Conjunctiva/sclera: Conjunctivae normal.      Pupils: Pupils are equal, round, and reactive to light.      Comments: RT facial swelling (see pic)   Cardiovascular:      Rate and Rhythm: Normal rate and regular rhythm.      Pulses: Normal pulses.      Heart sounds: Normal heart sounds. No " murmur heard.  Pulmonary:      Effort: No respiratory distress.      Breath sounds: Normal breath sounds.   Abdominal:      General: Bowel sounds are normal. There is no distension.      Palpations: Abdomen is soft.      Tenderness: There is no abdominal tenderness. There is no guarding or rebound.   Musculoskeletal:      Cervical back: Neck supple. No tenderness.   Skin:     Comments: Urticaria; Skin colored papule to Rt cheek - slightly irregular borders - see pic   Neurological:      General: No focal deficit present.      Mental Status: She is alert and oriented to person, place, and time.   Psychiatric:         Mood and Affect: Mood normal.         Behavior: Behavior normal.                    Laboratory:  CBC:  Recent Labs   Lab 06/20/22  1528 06/14/23  0900   WBC 8.1 7.93   RBC 4.12 L 4.86   Hemoglobin 11.3 L 13.1   Hematocrit 34.4 L 41.7   Platelets 349 333   MCV 83.6 86   MCH 27.6 27.0   MCHC 33.0 31.4 L       CMP:  Recent Labs   Lab 05/01/21  0828 06/20/22  1528 06/14/23  0900 07/05/23  1311 10/06/23  0918   Glucose  --  106 H 337 H   < > 121 H   Calcium  --  9.6 9.3   < > 9.5   Albumin   < >  --  3.7  --  3.8   ALBUMIN  --  4.7  --   --   --    Total Protein   < > 7.6 7.4  --  7.8   Sodium  --  142 135 L   < > 140   Potassium  --  3.9 4.2   < > 4.2   CO2  --  28 22 L   < > 28   Chloride  --  103 100   < > 104   BUN  --  13.0 16   < > 15   Creatinine  --  0.62 0.9   < > 0.8   Alkaline Phosphatase  --  104 91  --  96   ALT  --  16 23  --  13   AST  --  16 16  --  16   Total Bilirubin  --  0.3 0.5  --  0.4    < > = values in this interval not displayed.         LIPIDS:  Recent Labs   Lab 03/12/21  1113 07/12/21  0000 06/20/22  1528 06/14/23  0900   TSH  --   --  1.40 0.965   HDL 57 62 56 60   Cholesterol 202 H 175 179 198   Triglycerides 98 82 86 101   LDL Calculated 129 H 100 109  --    LDL Cholesterol  --   --   --  117.8   HDL/Cholesterol Ratio  --   --   --  30.3   Non-HDL Cholesterol 145 H 113  --  138    Total Cholesterol/HDL Ratio  --   --   --  3.3       TSH:  Recent Labs   Lab 06/20/22  1528 06/14/23  0900   TSH 1.40 0.965       A1C:  Recent Labs   Lab 03/12/21  1113 07/12/21  0000 06/20/22  1528 06/14/23  0900 10/06/23  0918   Hemoglobin A1C 8.0 H 6.0 H 7.1 H 12.4 H 6.6 H       Urine Microalbumin/Cr:  Recent Labs   Lab 06/22/22  1614 06/14/23  0903   Microalb/Creat Ratio 18 20.4         Other:   Recent Labs   Lab 07/06/21  1553   Vit D, 25-Hydroxy 30   Ferritin 121   Iron 38   Transferrin 280   TIBC 414   Saturated Iron 9 L     Recent Labs   Lab 06/20/22  1528   Hepatitis C Ab NON-REACTIVE       Assessment/Plan     Linn Price is a 63 y.o.female with:    Multinodular goiter  -     US Soft Tissue Head Neck Thyroid; Future; Expected date: 10/10/2023  -     Anti-Thyroglobulin Antibody; Future; Expected date: 10/10/2023  -     Thyroid Peroxidase Antibody; Future; Expected date: 10/10/2023  - Check thyroid Ab's.  Repeat U/S thyroid.     Food allergy  -     EPINEPHrine (EPIPEN) 0.3 mg/0.3 mL AtIn; Inject 0.3 mLs (0.3 mg total) into the muscle once. for 1 dose  Dispense: 2 each; Refill: 0  - Refill epi pen     Skin lesion of face  -     Ambulatory referral/consult to Dermatology; Future; Expected date: 10/17/2023  - Refer to DermDr. Downey.    Type 2 diabetes mellitus without complication, without long-term current use of insulin  -     Comprehensive Metabolic Panel; Future; Expected date: 10/10/2023  -     Hemoglobin A1C; Future; Expected date: 10/10/2023  -     Microalbumin/Creatinine Ratio, Urine; Future; Expected date: 10/10/2023  - Controlled.  Cont current.     Hyperlipidemia, unspecified hyperlipidemia type  -     Lipid Panel; Future  -  Pt declines statin. Cont ASA.     Hypertension associated with diabetes  - Controlled.  Will stop Lisinopril given chronic urticaria/angioedema.     JANAE on CPAP  - Stable.  Cont current regimen.    Class 2 severe obesity due to excess calories with serious comorbidity  and body mass index (BMI) of 35.0 to 35.9 in adult  - Rec diet and exercise as discussed for wt loss.      Angioedema, sequela  - Fu by A/I.  Stop Lisinopril. Cont Antihistamines per Dr. Cooper.  Stay well hydrated. Check thyroid Ab's.     Chronic urticaria  -     Anti-Thyroglobulin Antibody; Future; Expected date: 10/10/2023  -     Thyroid Peroxidase Antibody; Future; Expected date: 10/10/2023  - Fu by A/I.  Stop Lisinopril. Cont Antihistamines per Dr. Cooper.  Stay well hydrated. Check thyroid Ab's.        Chronic conditions status updated as per HPI.  Other than changes above, cont current medications and maintain follow up with specialists.  Follow up in about 3 months (around 1/10/2024) for fu chronic issues or sooner if needed.      Erika Mcclure MD  [facility] [place]

## 2023-10-09 NOTE — PROGRESS NOTES
I sent pt a my chart message -  I reviewed your labs. Congrats, your Ha1c is controlled at 6.6 down from prev 12.4.  Keep up the good work, Your kidney and liver functions are normal.   Dr. YEAGER

## 2023-10-10 ENCOUNTER — OFFICE VISIT (OUTPATIENT)
Dept: PRIMARY CARE CLINIC | Facility: CLINIC | Age: 63
End: 2023-10-10
Payer: MEDICARE

## 2023-10-10 ENCOUNTER — TELEPHONE (OUTPATIENT)
Dept: DERMATOLOGY | Facility: CLINIC | Age: 63
End: 2023-10-10
Payer: MEDICARE

## 2023-10-10 VITALS
HEIGHT: 65 IN | BODY MASS INDEX: 33.72 KG/M2 | WEIGHT: 202.38 LBS | OXYGEN SATURATION: 98 % | TEMPERATURE: 98 F | RESPIRATION RATE: 20 BRPM | HEART RATE: 74 BPM | SYSTOLIC BLOOD PRESSURE: 122 MMHG | DIASTOLIC BLOOD PRESSURE: 68 MMHG

## 2023-10-10 DIAGNOSIS — E04.2 MULTINODULAR GOITER: Primary | ICD-10-CM

## 2023-10-10 DIAGNOSIS — L50.8 CHRONIC URTICARIA: ICD-10-CM

## 2023-10-10 DIAGNOSIS — E78.5 HYPERLIPIDEMIA, UNSPECIFIED HYPERLIPIDEMIA TYPE: ICD-10-CM

## 2023-10-10 DIAGNOSIS — Z91.018 FOOD ALLERGY: ICD-10-CM

## 2023-10-10 DIAGNOSIS — T78.3XXS ANGIOEDEMA, SEQUELA: ICD-10-CM

## 2023-10-10 DIAGNOSIS — E11.9 TYPE 2 DIABETES MELLITUS WITHOUT COMPLICATION, WITHOUT LONG-TERM CURRENT USE OF INSULIN: ICD-10-CM

## 2023-10-10 DIAGNOSIS — E11.59 HYPERTENSION ASSOCIATED WITH DIABETES: ICD-10-CM

## 2023-10-10 DIAGNOSIS — I15.2 HYPERTENSION ASSOCIATED WITH DIABETES: ICD-10-CM

## 2023-10-10 DIAGNOSIS — E66.01 CLASS 2 SEVERE OBESITY DUE TO EXCESS CALORIES WITH SERIOUS COMORBIDITY AND BODY MASS INDEX (BMI) OF 35.0 TO 35.9 IN ADULT: ICD-10-CM

## 2023-10-10 DIAGNOSIS — G47.33 OSA ON CPAP: ICD-10-CM

## 2023-10-10 DIAGNOSIS — L98.9 SKIN LESION OF FACE: ICD-10-CM

## 2023-10-10 PROBLEM — T78.3XXA ANGIO-EDEMA: Status: ACTIVE | Noted: 2023-10-10

## 2023-10-10 LAB
A ALTERNATA IGE QN: <0.1 KU/L
A FUMIGATUS IGE QN: <0.1 KU/L
ALLERGEN CHAETOMIUM GLOBOSUM IGE: <0.1 KU/L
ALMOND IGE QN: <0.1 KU/L
BAHIA GRASS IGE QN: <0.1 KU/L
BALD CYPRESS IGE QN: <0.1 KU/L
BERMUDA GRASS IGE QN: <0.1 KU/L
BLACK PEPPER IGE QN: <0.1 KU/L
C HERBARUM IGE QN: <0.1 KU/L
C LUNATA IGE QN: <0.1 KU/L
CAT DANDER IGE QN: <0.1 KU/L
CELERY IGE QN: <0.1 KU/L
CHAETOMIUM GLOB. CLASS: NORMAL
CHILI PEPPER IGE QN: <0.1 KU/L
CINNAMON IGE QN: <0.1 KU/L
CLAM IGE QN: <0.1 KU/L
COMMON RAGWEED IGE QN: <0.1 KU/L
COTTONWOOD IGE QN: <0.1 KU/L
COW MILK IGE QN: 0.28 KU/L
CRAB IGE QN: <0.1 KU/L
CRAWFISH IGE QN: <0.1 KU/L
D FARINAE IGE QN: <0.1 KU/L
D PTERONYSS IGE QN: <0.1 KU/L
DEPRECATED A ALTERNATA IGE RAST QL: NORMAL
DEPRECATED A FUMIGATUS IGE RAST QL: NORMAL
DEPRECATED ALMOND IGE RAST QL: NORMAL
DEPRECATED BAHIA GRASS IGE RAST QL: NORMAL
DEPRECATED BALD CYPRESS IGE RAST QL: NORMAL
DEPRECATED BERMUDA GRASS IGE RAST QL: NORMAL
DEPRECATED BLACK PEPPER IGE RAST QL: NORMAL
DEPRECATED C HERBARUM IGE RAST QL: NORMAL
DEPRECATED C LUNATA IGE RAST QL: NORMAL
DEPRECATED CAT DANDER IGE RAST QL: NORMAL
DEPRECATED CELERY IGE RAST QL: NORMAL
DEPRECATED CHILI PEPPER IGE RAST QL: NORMAL
DEPRECATED CINNAMON IGE RAST QL: NORMAL
DEPRECATED CLAM IGE RAST QL: NORMAL
DEPRECATED COMMON RAGWEED IGE RAST QL: NORMAL
DEPRECATED COTTONWOOD IGE RAST QL: NORMAL
DEPRECATED COW MILK IGE RAST QL: ABNORMAL
DEPRECATED CRAB IGE RAST QL: NORMAL
DEPRECATED CRAWFISH IGE RAST QL: NORMAL
DEPRECATED D FARINAE IGE RAST QL: NORMAL
DEPRECATED D PTERONYSS IGE RAST QL: NORMAL
DEPRECATED DOG DANDER IGE RAST QL: NORMAL
DEPRECATED EGG WHITE IGE RAST QL: ABNORMAL
DEPRECATED ELDER IGE RAST QL: NORMAL
DEPRECATED ENGL PLANTAIN IGE RAST QL: NORMAL
DEPRECATED GARLIC IGE RAST QL: NORMAL
DEPRECATED GINGER IGE RAST QL: NORMAL
DEPRECATED GREEN PEPPER IGE RAST QL: NORMAL
DEPRECATED LEMON IGE RAST QL: NORMAL
DEPRECATED LOBSTER IGE RAST QL: NORMAL
DEPRECATED LONDON PLANE IGE RAST QL: NORMAL
DEPRECATED MUGWORT IGE RAST QL: NORMAL
DEPRECATED MUSTARD IGE RAST QL: NORMAL
DEPRECATED OAT IGE RAST QL: NORMAL
DEPRECATED ONION IGE RAST QL: NORMAL
DEPRECATED OREGANO IGE RAST QL: NORMAL
DEPRECATED OYSTER IGE RAST QL: NORMAL
DEPRECATED P NOTATUM IGE RAST QL: NORMAL
DEPRECATED PAPRIKA IGE RAST QL: NORMAL
DEPRECATED PEANUT IGE RAST QL: NORMAL
DEPRECATED PECAN/HICK NUT IGE RAST QL: NORMAL
DEPRECATED PECAN/HICK TREE IGE RAST QL: NORMAL
DEPRECATED ROACH IGE RAST QL: NORMAL
DEPRECATED S ROSTRATA IGE RAST QL: NORMAL
DEPRECATED SALTWORT IGE RAST QL: NORMAL
DEPRECATED SCALLOP IGE RAST QL: NORMAL
DEPRECATED SESAME SEED IGE RAST QL: NORMAL
DEPRECATED SHRIMP IGE RAST QL: NORMAL
DEPRECATED SILVER BIRCH IGE RAST QL: NORMAL
DEPRECATED SOYBEAN IGE RAST QL: NORMAL
DEPRECATED SUNFLOWER SEED IGE RAST QL: NORMAL
DEPRECATED THYME IGE RAST QL: 0
DEPRECATED TIMOTHY IGE RAST QL: NORMAL
DEPRECATED WHEAT IGE RAST QL: NORMAL
DEPRECATED WHITE OAK IGE RAST QL: NORMAL
DEPRECATED WILLOW IGE RAST QL: NORMAL
DOG DANDER IGE QN: <0.1 KU/L
EGG WHITE IGE QN: 0.17 KU/L
ELDER IGE QN: <0.1 KU/L
ENGL PLANTAIN IGE QN: <0.1 KU/L
GARLIC IGE QN: <0.1 KU/L
GINGER IGE QN: <0.1 KU/L
GREEN PEPPER IGE QN: <0.1 KU/L
LEMON IGE QN: <0.1 KU/L
LOBSTER IGE QN: <0.1 KU/L
LONDON PLANE IGE QN: <0.1 KU/L
MUGWORT IGE QN: <0.1 KU/L
MUSTARD IGE QN: <0.1 KU/L
OAT IGE QN: <0.1 KU/L
ONION IGE QN: <0.1 KU/L
OREGANO IGE QN: <0.1 KU/L
OYSTER IGE QN: <0.1 KU/L
P NOTATUM IGE QN: <0.1 KU/L
PAPRIKA IGE QN: <0.1 KU/L
PEANUT IGE QN: <0.1 KU/L
PECAN/HICK NUT IGE QN: <0.1 KU/L
PECAN/HICK TREE IGE QN: <0.1 KU/L
ROACH IGE QN: <0.1 KU/L
S ROSTRATA IGE QN: <0.1 KU/L
SALTWORT IGE QN: <0.1 KU/L
SCALLOP IGE QN: <0.1 KU/L
SESAME SEED IGE QN: <0.1 KU/L
SHRIMP IGE QN: <0.1 KU/L
SILVER BIRCH IGE QN: <0.1 KU/L
SOYBEAN IGE QN: <0.1 KU/L
SUNFLOWER SEED IGE QN: <0.1 KU/L
THYME IGE QN: <0.1 KU/L
TIMOTHY IGE QN: <0.1 KU/L
WHEAT IGE QN: <0.1 KU/L
WHITE OAK IGE QN: <0.1 KU/L
WILLOW IGE QN: <0.1 KU/L

## 2023-10-10 PROCEDURE — 3078F DIAST BP <80 MM HG: CPT | Mod: HCNC,CPTII,S$GLB, | Performed by: INTERNAL MEDICINE

## 2023-10-10 PROCEDURE — 4010F PR ACE/ARB THEARPY RXD/TAKEN: ICD-10-PCS | Mod: HCNC,CPTII,S$GLB, | Performed by: INTERNAL MEDICINE

## 2023-10-10 PROCEDURE — 3061F PR NEG MICROALBUMINURIA RESULT DOCUMENTED/REVIEW: ICD-10-PCS | Mod: HCNC,CPTII,S$GLB, | Performed by: INTERNAL MEDICINE

## 2023-10-10 PROCEDURE — 1101F PR PT FALLS ASSESS DOC 0-1 FALLS W/OUT INJ PAST YR: ICD-10-PCS | Mod: HCNC,CPTII,S$GLB, | Performed by: INTERNAL MEDICINE

## 2023-10-10 PROCEDURE — 1101F PT FALLS ASSESS-DOCD LE1/YR: CPT | Mod: HCNC,CPTII,S$GLB, | Performed by: INTERNAL MEDICINE

## 2023-10-10 PROCEDURE — 3008F PR BODY MASS INDEX (BMI) DOCUMENTED: ICD-10-PCS | Mod: HCNC,CPTII,S$GLB, | Performed by: INTERNAL MEDICINE

## 2023-10-10 PROCEDURE — 1159F PR MEDICATION LIST DOCUMENTED IN MEDICAL RECORD: ICD-10-PCS | Mod: HCNC,CPTII,S$GLB, | Performed by: INTERNAL MEDICINE

## 2023-10-10 PROCEDURE — 3288F FALL RISK ASSESSMENT DOCD: CPT | Mod: HCNC,CPTII,S$GLB, | Performed by: INTERNAL MEDICINE

## 2023-10-10 PROCEDURE — 3066F PR DOCUMENTATION OF TREATMENT FOR NEPHROPATHY: ICD-10-PCS | Mod: HCNC,CPTII,S$GLB, | Performed by: INTERNAL MEDICINE

## 2023-10-10 PROCEDURE — 99999 PR PBB SHADOW E&M-EST. PATIENT-LVL V: ICD-10-PCS | Mod: PBBFAC,HCNC,, | Performed by: INTERNAL MEDICINE

## 2023-10-10 PROCEDURE — 3074F SYST BP LT 130 MM HG: CPT | Mod: HCNC,CPTII,S$GLB, | Performed by: INTERNAL MEDICINE

## 2023-10-10 PROCEDURE — 3008F BODY MASS INDEX DOCD: CPT | Mod: HCNC,CPTII,S$GLB, | Performed by: INTERNAL MEDICINE

## 2023-10-10 PROCEDURE — 3061F NEG MICROALBUMINURIA REV: CPT | Mod: HCNC,CPTII,S$GLB, | Performed by: INTERNAL MEDICINE

## 2023-10-10 PROCEDURE — 99214 OFFICE O/P EST MOD 30 MIN: CPT | Mod: HCNC,S$GLB,, | Performed by: INTERNAL MEDICINE

## 2023-10-10 PROCEDURE — 3044F HG A1C LEVEL LT 7.0%: CPT | Mod: HCNC,CPTII,S$GLB, | Performed by: INTERNAL MEDICINE

## 2023-10-10 PROCEDURE — 1160F RVW MEDS BY RX/DR IN RCRD: CPT | Mod: HCNC,CPTII,S$GLB, | Performed by: INTERNAL MEDICINE

## 2023-10-10 PROCEDURE — 99214 PR OFFICE/OUTPT VISIT, EST, LEVL IV, 30-39 MIN: ICD-10-PCS | Mod: HCNC,S$GLB,, | Performed by: INTERNAL MEDICINE

## 2023-10-10 PROCEDURE — 1160F PR REVIEW ALL MEDS BY PRESCRIBER/CLIN PHARMACIST DOCUMENTED: ICD-10-PCS | Mod: HCNC,CPTII,S$GLB, | Performed by: INTERNAL MEDICINE

## 2023-10-10 PROCEDURE — 3288F PR FALLS RISK ASSESSMENT DOCUMENTED: ICD-10-PCS | Mod: HCNC,CPTII,S$GLB, | Performed by: INTERNAL MEDICINE

## 2023-10-10 PROCEDURE — 1159F MED LIST DOCD IN RCRD: CPT | Mod: HCNC,CPTII,S$GLB, | Performed by: INTERNAL MEDICINE

## 2023-10-10 PROCEDURE — 3078F PR MOST RECENT DIASTOLIC BLOOD PRESSURE < 80 MM HG: ICD-10-PCS | Mod: HCNC,CPTII,S$GLB, | Performed by: INTERNAL MEDICINE

## 2023-10-10 PROCEDURE — 3074F PR MOST RECENT SYSTOLIC BLOOD PRESSURE < 130 MM HG: ICD-10-PCS | Mod: HCNC,CPTII,S$GLB, | Performed by: INTERNAL MEDICINE

## 2023-10-10 PROCEDURE — 99999 PR PBB SHADOW E&M-EST. PATIENT-LVL V: CPT | Mod: PBBFAC,HCNC,, | Performed by: INTERNAL MEDICINE

## 2023-10-10 PROCEDURE — 4010F ACE/ARB THERAPY RXD/TAKEN: CPT | Mod: HCNC,CPTII,S$GLB, | Performed by: INTERNAL MEDICINE

## 2023-10-10 PROCEDURE — 3044F PR MOST RECENT HEMOGLOBIN A1C LEVEL <7.0%: ICD-10-PCS | Mod: HCNC,CPTII,S$GLB, | Performed by: INTERNAL MEDICINE

## 2023-10-10 PROCEDURE — 3066F NEPHROPATHY DOC TX: CPT | Mod: HCNC,CPTII,S$GLB, | Performed by: INTERNAL MEDICINE

## 2023-10-10 RX ORDER — EPINEPHRINE 0.3 MG/.3ML
1 INJECTION SUBCUTANEOUS ONCE
Qty: 2 EACH | Refills: 0 | Status: SHIPPED | OUTPATIENT
Start: 2023-10-10 | End: 2023-10-10

## 2023-10-11 ENCOUNTER — PATIENT MESSAGE (OUTPATIENT)
Dept: PRIMARY CARE CLINIC | Facility: CLINIC | Age: 63
End: 2023-10-11
Payer: MEDICARE

## 2023-10-13 ENCOUNTER — CLINICAL SUPPORT (OUTPATIENT)
Dept: REHABILITATION | Facility: HOSPITAL | Age: 63
End: 2023-10-13
Payer: MEDICARE

## 2023-10-13 DIAGNOSIS — R29.898 DECREASED STRENGTH OF UPPER EXTREMITY: ICD-10-CM

## 2023-10-13 DIAGNOSIS — R29.3 POSTURE ABNORMALITY: Primary | ICD-10-CM

## 2023-10-13 DIAGNOSIS — M53.82 IMPAIRED RANGE OF MOTION OF CERVICAL SPINE: ICD-10-CM

## 2023-10-13 DIAGNOSIS — M54.2 CHRONIC NECK PAIN: ICD-10-CM

## 2023-10-13 DIAGNOSIS — G89.29 CHRONIC NECK PAIN: ICD-10-CM

## 2023-10-13 PROCEDURE — 97110 THERAPEUTIC EXERCISES: CPT | Mod: CQ

## 2023-10-13 PROCEDURE — 97140 MANUAL THERAPY 1/> REGIONS: CPT | Mod: CQ

## 2023-10-13 NOTE — PROGRESS NOTES
"[facility]   Physical Therapy Treatment Note      Name: Linn Price  Clinic Number: [id number]    Therapy Diagnosis:   Encounter Diagnoses   Name Primary?    Posture abnormality Yes    Decreased strength of upper extremity     Impaired range of motion of cervical spine     Chronic neck pain      Physician: Erika Mcclure MD    Visit Date: 10/13/2023    Physician Orders: PT Eval and Treat   Medical Diagnosis from Referral: M54.2,G89.29 (ICD-10-CM) - Chronic neck pain  Evaluation Date: 6/29/2023  Authorization Period Expiration: 12/31/2023  Plan of Care Expiration: 10/27/2023  Progress Note Due: 10/27/2023  Visit # / Visits authorized: 14/ 20  FOTO: 1/3      Precautions: Standard, Diabetes, blood thinners, and elevated blood glucose      PTA Visit #: 1/5     Time In: 1:25 PM   Time Out: 1:55 PM   Total Billable Time: 30 minutes    Subjective     Pt reports some tightness throughout cervical spine upon entering Physical Therapy treatment.      She was  with home exercise program.   Response to previous treatment: decreased stiffness  Functional change: in progress     Pain: 0/10  Location: neck L > R    Objective      Objective Measures updated at progress report unless specified.     Treatment     Linn received the treatments listed below:      therapeutic exercises to develop strength, endurance, ROM, flexibility, posture, and core stabilization for 20 minutes including:     Upper trap stretch with upper extremity support and overpressure 3x30s both sides  Levator scapulae stretch with upper extremity support and overpressure 3x30s both sides  Prone on elbows chin tuck 20x 3 sec hold - NP  B ER + cervical rotation at wall RTB x15 each side  Seated chin tuck 3 sec hold x20  Open book 5" hold B x15 ea - added YT resistance today  Seated cervical extension w/ towel 10x 10 sec hold     NT:  Seated B cervical rotation w/towel 5 sec hold 2x10 ea  Serratus wall slides with arms in " "pillowcase X 20  Seated horizontal abduction  Standing RTB shld ext 3x10/3"      Linn received the following manual therapy techniques: to the: C/S for 8 minutes, including:     Soft Tissue Massage cervicals spine     NOT PERFORMED TODAY   CPA and UPA thoracic mobs, prone today C5-T5  Contract Relax upper trap   Seated MWM into R & L rotational glides with UPA grade 2-3  Seated UPA C5-T2     NT:  Prone CTJ MWM chin tuck + chin tuck with rotation     Linn participated in neuromuscular re-education activities to improve: Balance, Coordination, Kinesthetic, Sense, Proprioception, and Posture for 00 minutes. The following activities were included:        Linn participated in dynamic functional therapeutic activities to improve functional performance for 00 minutes, including:  UBE 4'/4' pulling/pushing  - not today   Jewels shld flexion 5'     Patient Education and Home Exercises       Education provided:   - Compliance with HEP      Written Home Exercises Provided: yes.  Exercises were reviewed and Linn was able to demonstrate them prior to the end of the session.  Linn demonstrated good  understanding of the education provided.    Assessment     Patient received shortened Physical Therapist treatment today due to patient running 25 minutes late for original appointment time. Patient with some discomfort throughout cervical spine Levator Scap and Upper Trap self stretching due to tightness at end range, however, patient able to perform full task. Patient with more tightness throughout right side of cervical spine today, however, patient informed Physical Therapist Assistant patient's tightness and discomfort is normally throughout left Upper Trap.     Linn Is progressing well towards her goals.   Pt prognosis is Good.     Pt will continue to benefit from skilled outpatient physical therapy to address the deficits listed in the problem list box on initial evaluation, provide pt/family education and to " maximize pt's level of independence in the home and community environment.     Pt's spiritual, cultural and educational needs considered and pt agreeable to plan of care and goals.     Anticipated barriers to physical therapy: none     Goals:   Short Term Goals: 4-6 weeks  1. Pt will be compliant with HEP 50% of prescribed amount.   MET  2. The pt to demo improvement in pain free cervical left rotation by 10% of full ROM.  Progressing  3.  Report decreased neck pain  <   / =  2 /10  to increase tolerance for adls, work.  MET  4. Pt will demo improvement in midline cervical alignment in sitting to improve posture.  Progressing     Long Term Goals: 8-10 weeks   Pt will be compliant with % of prescribed amount.  Progressing  Patient will improve their FOTO limitation score to at least 36% as evidence of clinically significant improvements in their function.  Progressing  Pt will demo improvement in 20% of cervical left rotation to equal R cervical rotation.  Progressing  The pt will report full participation in ADLs and IADLs without restrictions related to neck pain.  Progressing    Plan     Continue with Physical Therapist Plan of Care.     Jj Thakur, PTA

## 2023-10-16 ENCOUNTER — HOSPITAL ENCOUNTER (OUTPATIENT)
Dept: RADIOLOGY | Facility: HOSPITAL | Age: 63
Discharge: HOME | End: 2023-10-16
Attending: INTERNAL MEDICINE
Payer: MEDICARE

## 2023-10-16 DIAGNOSIS — E04.2 MULTINODULAR GOITER: ICD-10-CM

## 2023-10-16 PROCEDURE — 76536 US EXAM OF HEAD AND NECK: CPT | Mod: 26,,, | Performed by: RADIOLOGY

## 2023-10-16 PROCEDURE — 76536 US EXAM OF HEAD AND NECK: CPT | Mod: TC

## 2023-10-16 PROCEDURE — 76536 US SOFT TISSUE HEAD NECK THYROID: ICD-10-PCS | Mod: 26,,, | Performed by: RADIOLOGY

## 2023-10-17 LAB
ALLERGEN NAME: NORMAL
ALLERGEN RESULT: NORMAL

## 2023-10-17 NOTE — PROGRESS NOTES
I sent pt a my chart message -  I reviewed your thyroid Ultrasound which showed a Stable subcentimeter (9mm and 6 mm) Multinodular goiter which does not meet criteria for fine-needle aspiration.     Dr. YEAGER

## 2023-10-18 ENCOUNTER — OFFICE VISIT (OUTPATIENT)
Dept: DERMATOLOGY | Facility: CLINIC | Age: 63
End: 2023-10-18
Payer: MEDICARE

## 2023-10-18 DIAGNOSIS — L82.1 SEBORRHEIC KERATOSES: ICD-10-CM

## 2023-10-18 DIAGNOSIS — Z12.83 SKIN CANCER SCREENING: ICD-10-CM

## 2023-10-18 DIAGNOSIS — L50.9 URTICARIA: ICD-10-CM

## 2023-10-18 DIAGNOSIS — D22.9 MULTIPLE BENIGN NEVI: Primary | ICD-10-CM

## 2023-10-18 PROCEDURE — 99213 OFFICE O/P EST LOW 20 MIN: CPT | Mod: HCNC,S$GLB,, | Performed by: DERMATOLOGY

## 2023-10-18 PROCEDURE — 4010F ACE/ARB THERAPY RXD/TAKEN: CPT | Mod: HCNC,CPTII,S$GLB, | Performed by: DERMATOLOGY

## 2023-10-18 PROCEDURE — 3061F NEG MICROALBUMINURIA REV: CPT | Mod: HCNC,CPTII,S$GLB, | Performed by: DERMATOLOGY

## 2023-10-18 PROCEDURE — 1160F PR REVIEW ALL MEDS BY PRESCRIBER/CLIN PHARMACIST DOCUMENTED: ICD-10-PCS | Mod: HCNC,CPTII,S$GLB, | Performed by: DERMATOLOGY

## 2023-10-18 PROCEDURE — 1159F MED LIST DOCD IN RCRD: CPT | Mod: HCNC,CPTII,S$GLB, | Performed by: DERMATOLOGY

## 2023-10-18 PROCEDURE — 3044F PR MOST RECENT HEMOGLOBIN A1C LEVEL <7.0%: ICD-10-PCS | Mod: HCNC,CPTII,S$GLB, | Performed by: DERMATOLOGY

## 2023-10-18 PROCEDURE — 1159F PR MEDICATION LIST DOCUMENTED IN MEDICAL RECORD: ICD-10-PCS | Mod: HCNC,CPTII,S$GLB, | Performed by: DERMATOLOGY

## 2023-10-18 PROCEDURE — 99999 PR PBB SHADOW E&M-EST. PATIENT-LVL II: CPT | Mod: PBBFAC,HCNC,, | Performed by: DERMATOLOGY

## 2023-10-18 PROCEDURE — 4010F PR ACE/ARB THEARPY RXD/TAKEN: ICD-10-PCS | Mod: HCNC,CPTII,S$GLB, | Performed by: DERMATOLOGY

## 2023-10-18 PROCEDURE — 3044F HG A1C LEVEL LT 7.0%: CPT | Mod: HCNC,CPTII,S$GLB, | Performed by: DERMATOLOGY

## 2023-10-18 PROCEDURE — 1101F PT FALLS ASSESS-DOCD LE1/YR: CPT | Mod: HCNC,CPTII,S$GLB, | Performed by: DERMATOLOGY

## 2023-10-18 PROCEDURE — 3061F PR NEG MICROALBUMINURIA RESULT DOCUMENTED/REVIEW: ICD-10-PCS | Mod: HCNC,CPTII,S$GLB, | Performed by: DERMATOLOGY

## 2023-10-18 PROCEDURE — 3288F FALL RISK ASSESSMENT DOCD: CPT | Mod: HCNC,CPTII,S$GLB, | Performed by: DERMATOLOGY

## 2023-10-18 PROCEDURE — 3066F NEPHROPATHY DOC TX: CPT | Mod: HCNC,CPTII,S$GLB, | Performed by: DERMATOLOGY

## 2023-10-18 PROCEDURE — 3288F PR FALLS RISK ASSESSMENT DOCUMENTED: ICD-10-PCS | Mod: HCNC,CPTII,S$GLB, | Performed by: DERMATOLOGY

## 2023-10-18 PROCEDURE — 1160F RVW MEDS BY RX/DR IN RCRD: CPT | Mod: HCNC,CPTII,S$GLB, | Performed by: DERMATOLOGY

## 2023-10-18 PROCEDURE — 3066F PR DOCUMENTATION OF TREATMENT FOR NEPHROPATHY: ICD-10-PCS | Mod: HCNC,CPTII,S$GLB, | Performed by: DERMATOLOGY

## 2023-10-18 PROCEDURE — 99999 PR PBB SHADOW E&M-EST. PATIENT-LVL II: ICD-10-PCS | Mod: PBBFAC,HCNC,, | Performed by: DERMATOLOGY

## 2023-10-18 PROCEDURE — 99213 PR OFFICE/OUTPT VISIT, EST, LEVL III, 20-29 MIN: ICD-10-PCS | Mod: HCNC,S$GLB,, | Performed by: DERMATOLOGY

## 2023-10-18 PROCEDURE — 1101F PR PT FALLS ASSESS DOC 0-1 FALLS W/OUT INJ PAST YR: ICD-10-PCS | Mod: HCNC,CPTII,S$GLB, | Performed by: DERMATOLOGY

## 2023-10-18 NOTE — PATIENT INSTRUCTIONS

## 2023-10-18 NOTE — PROGRESS NOTES
Subjective:      Patient ID:  Linn Price is a 63 y.o. female who presents for   Chief Complaint   Patient presents with    Rash     Face swelling      Patient here for Upper Body Skin Exam    Last seen by dermatologist: 08/15/2023    none - personal history of atypical moles removed  none - personal history of MM   none - family history of MM  none - blistering sunburns  none - tanning bed use  none - personal history of NMSC    No new concerning moles or lesions        Rash - Follow-up  Symptom course: unchanged  Affected locations: diffuse  Signs / symptoms: asymptomatic and itching (hives)  Severity: mild to moderate      Went to allergy a few weeks ago - states the tests were all negative.  Has been on nightly zyrtec since the allergy appt. Forgot to take it last night and woke up with hives all over today.  Was getting lip swelling at least once a week. Was on lisinopril for about a month before the lip swelling started. Stopped lisinopril about a week ago, and hasn't had any episodes since.  Has an epipen at home.      Review of Systems   Constitutional:  Negative for fever, chills and fatigue.   Skin:  Positive for rash. Negative for itching.       Objective:   Physical Exam   Constitutional: She appears well-developed and well-nourished. No distress.   Neurological: She is alert and oriented to person, place, and time. She is not disoriented.   Psychiatric: She has a normal mood and affect.   Skin:   Areas Examined (abnormalities noted in diagram):   Head / Face Inspection Performed  Neck Inspection Performed  Chest / Axilla Inspection Performed  Abdomen Inspection Performed  Back Inspection Performed  RUE Inspected  LUE Inspection Performed                 Diagram Legend     Erythematous scaling macule/papule c/w actinic keratosis       Vascular papule c/w angioma      Pigmented verrucoid papule/plaque c/w seborrheic keratosis      Yellow umbilicated papule c/w sebaceous hyperplasia       Irregularly shaped tan macule c/w lentigo     1-2 mm smooth white papules consistent with Milia      Movable subcutaneous cyst with punctum c/w epidermal inclusion cyst      Subcutaneous movable cyst c/w pilar cyst      Firm pink to brown papule c/w dermatofibroma      Pedunculated fleshy papule(s) c/w skin tag(s)      Evenly pigmented macule c/w junctional nevus     Mildly variegated pigmented, slightly irregular-bordered macule c/w mildly atypical nevus      Flesh colored to evenly pigmented papule c/w intradermal nevus       Pink pearly papule/plaque c/w basal cell carcinoma      Erythematous hyperkeratotic cursted plaque c/w SCC      Surgical scar with no sign of skin cancer recurrence      Open and closed comedones      Inflammatory papules and pustules      Verrucoid papule consistent consistent with wart     Erythematous eczematous patches and plaques     Dystrophic onycholytic nail with subungual debris c/w onychomycosis     Umbilicated papule    Erythematous-base heme-crusted tan verrucoid plaque consistent with inflamed seborrheic keratosis     Erythematous Silvery Scaling Plaque c/w Psoriasis     See annotation      Assessment / Plan:        Multiple benign nevi  Benign-appearing nevi present on exam today. Reassurance provided. Periodically examine moles and return to clinic if any moles change or become symptomatic (bleeding, itching, pain, etc).    Seborrheic keratoses  These are benign inherited growths without a malignant potential. Reassurance given to patient. No treatment is necessary.   Treatment of benign, asymptomatic lesions may be considered cosmetic.  Warned about risk of hypo- or hyperpigmentation with treatment and risk of recurrence.    Urticaria  Rec: continuing antihistamines and following up with allergist if hives persist    Skin cancer screening  Upper body skin examination performed today including at least 6 points as noted in physical examination. No lesions suspicious for  malignancy noted.  Patient instructed in importance of daily broad spectrum sunscreen use with spf at least 30. Sun avoidance and topical protection/protective clothing discussed.    Return to clinic in about 1 year or sooner for any concerns.

## 2023-10-19 ENCOUNTER — CLINICAL SUPPORT (OUTPATIENT)
Dept: REHABILITATION | Facility: HOSPITAL | Age: 63
End: 2023-10-19
Payer: MEDICARE

## 2023-10-19 DIAGNOSIS — R29.3 POSTURE ABNORMALITY: Primary | ICD-10-CM

## 2023-10-19 DIAGNOSIS — M53.82 IMPAIRED RANGE OF MOTION OF CERVICAL SPINE: ICD-10-CM

## 2023-10-19 DIAGNOSIS — R29.898 DECREASED STRENGTH OF UPPER EXTREMITY: ICD-10-CM

## 2023-10-19 LAB
BASOPHILS %, CD203C: 2.5 % (ref 0–12)
IGE RECEPTOR AB INTERPRETATION:: NORMAL

## 2023-10-19 PROCEDURE — 97110 THERAPEUTIC EXERCISES: CPT | Mod: CQ

## 2023-10-19 NOTE — PROGRESS NOTES
"[facility]   Physical Therapy Treatment Note      Name: Linn Price  Clinic Number: [id number]    Therapy Diagnosis:   Encounter Diagnoses   Name Primary?    Posture abnormality Yes    Decreased strength of upper extremity     Impaired range of motion of cervical spine      Physician: NISHA Su NP    Visit Date: 10/19/2023    Physician Orders: PT Eval and Treat   Medical Diagnosis from Referral: M54.2,G89.29 (ICD-10-CM) - Chronic neck pain  Evaluation Date: 6/29/2023  Authorization Period Expiration: 12/31/2023  Plan of Care Expiration: 10/27/2023  Progress Note Due: 10/27/2023    Visit # / Visits authorized: 15/20  FOTO: 1/3      Precautions: Standard, Diabetes, blood thinners, and elevated blood glucose      PTA Visit #: 1/5     Time In: 2:55 PM   Time Out: 3:30 PM   Total Billable Time: 25 minutes    Subjective     Pt reports continued tightness throughout cervical spine, however, since attending Physical Therapy treatment, patient feels she has gained a lot of range of montion.      She was  with home exercise program.   Response to previous treatment: decreased stiffness  Functional change: in progress     Pain: 3/10  Location: neck L > R    Objective      Objective Measures updated at progress report unless specified.     Treatment     Linn received the treatments listed below:      therapeutic exercises to develop strength, endurance, ROM, flexibility, posture, and core stabilization for 20 minutes including:     Upper trap stretch with upper extremity support and overpressure 3x30s both sides  Levator scapulae stretch with upper extremity support and overpressure 3x30s both sides  Seated B cervical rotation w/towel 5 sec hold 2x10 ea  Serratus wall slides with arms in pillowcase X 20  Bilateral external rotation + cervical rotation at wall red thera band x 15 each side  Seated chin tuck 3 sec hold x20  Open book 5" hold B x15 ea - added yellow thera band   Seated " "cervical extension w/ towel 10x 10 sec hold     NT:  Prone on elbows chin tuck 20x 3 sec hold - NP  Seated horizontal abduction  Standing RTB shld ext 3x10/3"      Linn received the following manual therapy techniques: to the: C/S for 8 minutes, including:     Soft Tissue Massage cervicals spine     NOT PERFORMED TODAY   CPA and UPA thoracic mobs, prone today C5-T5  Contract Relax upper trap   Seated MWM into R & L rotational glides with UPA grade 2-3  Seated UPA C5-T2     NT:  Prone CTJ MWM chin tuck + chin tuck with rotation     Linn participated in neuromuscular re-education activities to improve: Balance, Coordination, Kinesthetic, Sense, Proprioception, and Posture for 00 minutes. The following activities were included:        Linn participated in dynamic functional therapeutic activities to improve functional performance for 00 minutes, including:  UBE 4'/4' pulling/pushing  - not today   Jewels shld flexion 5'     Patient Education and Home Exercises       Education provided:   - Compliance with HEP      Written Home Exercises Provided: yes.  Exercises were reviewed and Linn was able to demonstrate them prior to the end of the session.  Linn demonstrated good  understanding of the education provided.    Assessment     Patient continues to complete therapeutic exercise without reports of increased pain throughout treatment, however, patient with some discomfort throughout self cervical stretching. Patient still with tightness throughout both sides of cervical spine throughout Soft Tissue Massage, however, patient with slight more tenderness throughout right side of cervical spine than right. Will continue to monitor and progress patient as tolerated.      Linn Is progressing well towards her goals.   Pt prognosis is Good.     Pt will continue to benefit from skilled outpatient physical therapy to address the deficits listed in the problem list box on initial evaluation, provide pt/family education " and to maximize pt's level of independence in the home and community environment.     Pt's spiritual, cultural and educational needs considered and pt agreeable to plan of care and goals.     Anticipated barriers to physical therapy: none     Goals:   Short Term Goals: 4-6 weeks  1. Pt will be compliant with HEP 50% of prescribed amount.   MET  2. The pt to demo improvement in pain free cervical left rotation by 10% of full ROM.  Progressing  3.  Report decreased neck pain  <   / =  2 /10  to increase tolerance for adls, work.  MET  4. Pt will demo improvement in midline cervical alignment in sitting to improve posture.  Progressing     Long Term Goals: 8-10 weeks   Pt will be compliant with % of prescribed amount.  Progressing  Patient will improve their FOTO limitation score to at least 36% as evidence of clinically significant improvements in their function.  Progressing  Pt will demo improvement in 20% of cervical left rotation to equal R cervical rotation.  Progressing  The pt will report full participation in ADLs and IADLs without restrictions related to neck pain.  Progressing    Plan     Continue with Physical Therapist Plan of Care.     Jj Thakur, PTA

## 2023-10-24 ENCOUNTER — PATIENT MESSAGE (OUTPATIENT)
Dept: ALLERGY | Facility: CLINIC | Age: 63
End: 2023-10-24
Payer: MEDICARE

## 2023-10-24 DIAGNOSIS — E11.9 TYPE 2 DIABETES MELLITUS WITHOUT COMPLICATION, WITHOUT LONG-TERM CURRENT USE OF INSULIN: ICD-10-CM

## 2023-10-24 RX ORDER — SEMAGLUTIDE 0.68 MG/ML
INJECTION, SOLUTION SUBCUTANEOUS
Qty: 9 ML | Refills: 0 | Status: SHIPPED | OUTPATIENT
Start: 2023-10-24 | End: 2024-01-04

## 2023-10-24 NOTE — TELEPHONE ENCOUNTER
No care due was identified.  [facility] Embedded Care Due Messages. Reference number: [id number].   10/24/2023 4:31:00 PM CDT

## 2023-10-24 NOTE — TELEPHONE ENCOUNTER
"LOV 10/10/23  Annual 1/21/22  RTC 4/9/24    Patient is requesting a refill for semaglutide. Patient would like a 2 month supply because "it is usually delayed".     "

## 2023-10-25 ENCOUNTER — PATIENT MESSAGE (OUTPATIENT)
Dept: PRIMARY CARE CLINIC | Facility: CLINIC | Age: 63
End: 2023-10-25
Payer: MEDICARE

## 2023-11-10 ENCOUNTER — CLINICAL SUPPORT (OUTPATIENT)
Dept: REHABILITATION | Facility: HOSPITAL | Age: 63
End: 2023-11-10
Payer: MEDICARE

## 2023-11-10 DIAGNOSIS — R29.898 DECREASED STRENGTH OF UPPER EXTREMITY: ICD-10-CM

## 2023-11-10 DIAGNOSIS — M53.82 IMPAIRED RANGE OF MOTION OF CERVICAL SPINE: ICD-10-CM

## 2023-11-10 DIAGNOSIS — R29.3 POSTURE ABNORMALITY: Primary | ICD-10-CM

## 2023-11-10 PROCEDURE — 97110 THERAPEUTIC EXERCISES: CPT

## 2023-11-10 PROCEDURE — 97140 MANUAL THERAPY 1/> REGIONS: CPT

## 2023-11-10 NOTE — PROGRESS NOTES
[facility]   Physical Therapy Treatment Note      Name: Linn Price  Clinic Number: [id number]    Therapy Diagnosis:   Encounter Diagnoses   Name Primary?    Posture abnormality Yes    Decreased strength of upper extremity     Impaired range of motion of cervical spine        Physician: Erika Mcclure MD    Visit Date: 11/10/2023    Physician Orders: PT Eval and Treat   Medical Diagnosis from Referral: M54.2,G89.29 (ICD-10-CM) - Chronic neck pain  Evaluation Date: 6/29/2023  Authorization Period Expiration: 12/31/2023  Plan of Care Expiration: 10/27/2023  Progress Note Due: 10/27/2023    Visit # / Visits authorized: 18/20  FOTO: 1/3      Precautions: Standard, Diabetes, blood thinners, and elevated blood glucose      PTA Visit #: 1/5     Time In: 12:30 PM   Time Out: 12:55 PM   Total Billable Time: 25 minutes    Subjective     Pt reports continued tightness throughout cervical spine, however, since attending Physical Therapy treatment, patient feels she has gained a lot of range of montion.      She was  with home exercise program.   Response to previous treatment: decreased stiffness  Functional change: in progress     Pain: 3/10  Location: neck L > R    Objective      Objective Measures updated at progress report unless specified.     Treatment     Linn received the treatments listed below:      therapeutic exercises to develop strength, endurance, ROM, flexibility, posture, and core stabilization for 10 minutes including:    Education on home exercise program with performance critiques    NT:  Upper trap stretch with upper extremity support and overpressure 3x30s both sides  Levator scapulae stretch with upper extremity support and overpressure 3x30s both sides  Seated B cervical rotation w/towel 5 sec hold 2x10 ea  Serratus wall slides with arms in pillowcase X 20  Bilateral external rotation + cervical rotation at wall red thera band x 15 each side  Seated chin tuck 3  "sec hold x20  Open book 5" hold B x15 ea - added yellow thera band   Seated cervical extension w/ towel 10x 10 sec hold     NT:  Prone on elbows chin tuck 20x 3 sec hold - NP  Seated horizontal abduction  Standing RTB shld ext 3x10/3"      Linn received the following manual therapy techniques: to the: C/S for 15 minutes, including:     Seated MWM into R & L rotational glides with UPA grade 2-3  MWM upper thoracic lower cervical extension  Upper cervical side bend MET    NOT PERFORMED TODAY   CPA and UPA thoracic mobs, prone today C5-T5  Contract Relax upper trap   Seated UPA C5-T2     NT:  Prone CTJ MWM chin tuck + chin tuck with rotation     Linn participated in neuromuscular re-education activities to improve: Balance, Coordination, Kinesthetic, Sense, Proprioception, and Posture for 00 minutes. The following activities were included:        Linn participated in dynamic functional therapeutic activities to improve functional performance for 00 minutes, including:  UBE 4'/4' pulling/pushing  - not today   Jewels shld flexion 5'     Patient Education and Home Exercises       Education provided:   - Compliance with HEP      Written Home Exercises Provided: yes.  Exercises were reviewed and Linn was able to demonstrate them prior to the end of the session.  Linn demonstrated good  understanding of the education provided.    Assessment     Patient presented 15 minutes late to session. Session focused heavily on education for home exercise program and manual therapy techniques. Patient responded well to MWM techniques today. Patient continues to present limitations with cervical rotation most limited due to forward head posture. Patient will be seen for 1-2 more sessions and then will be discharged from Physical Therapy.    Linn Is progressing well towards her goals.   Pt prognosis is Good.     Pt will continue to benefit from skilled outpatient physical therapy to address the deficits listed in the problem " list box on initial evaluation, provide pt/family education and to maximize pt's level of independence in the home and community environment.     Pt's spiritual, cultural and educational needs considered and pt agreeable to plan of care and goals.     Anticipated barriers to physical therapy: none     Goals:   Short Term Goals: 4-6 weeks  1. Pt will be compliant with HEP 50% of prescribed amount.   MET  2. The pt to demo improvement in pain free cervical left rotation by 10% of full ROM.  Progressing  3.  Report decreased neck pain  <   / =  2 /10  to increase tolerance for adls, work.  MET  4. Pt will demo improvement in midline cervical alignment in sitting to improve posture.  Progressing     Long Term Goals: 8-10 weeks   Pt will be compliant with % of prescribed amount.  Progressing  Patient will improve their FOTO limitation score to at least 36% as evidence of clinically significant improvements in their function.  Progressing  Pt will demo improvement in 20% of cervical left rotation to equal R cervical rotation.  Progressing  The pt will report full participation in ADLs and IADLs without restrictions related to neck pain.  Progressing    Plan     Continue with Physical Therapist Plan of Care.     Lniden Ramos, PT

## 2023-12-16 DIAGNOSIS — E11.9 TYPE 2 DIABETES MELLITUS WITHOUT COMPLICATION, WITHOUT LONG-TERM CURRENT USE OF INSULIN: ICD-10-CM

## 2023-12-16 RX ORDER — METFORMIN HYDROCHLORIDE 500 MG/1
TABLET, EXTENDED RELEASE ORAL
Qty: 180 TABLET | Refills: 1 | Status: SHIPPED | OUTPATIENT
Start: 2023-12-16

## 2023-12-16 NOTE — TELEPHONE ENCOUNTER
Refill Decision Note   Linn Price  is requesting a refill authorization.  Brief Assessment and Rationale for Refill:  Approve     Medication Therapy Plan:         Comments:     Note composed:7:20 AM 12/16/2023

## 2023-12-16 NOTE — TELEPHONE ENCOUNTER
No care due was identified.  [facility] Embedded Care Due Messages. Reference number: [id number].   12/16/2023 3:40:00 AM CST

## 2024-01-04 ENCOUNTER — OFFICE VISIT (OUTPATIENT)
Dept: ENDOCRINOLOGY | Facility: CLINIC | Age: 64
End: 2024-01-04
Payer: MEDICARE

## 2024-01-04 VITALS
DIASTOLIC BLOOD PRESSURE: 90 MMHG | SYSTOLIC BLOOD PRESSURE: 165 MMHG | WEIGHT: 214.06 LBS | OXYGEN SATURATION: 100 % | HEART RATE: 78 BPM | BODY MASS INDEX: 35.62 KG/M2

## 2024-01-04 DIAGNOSIS — E11.65 TYPE 2 DIABETES MELLITUS WITH HYPERGLYCEMIA, WITHOUT LONG-TERM CURRENT USE OF INSULIN: Primary | ICD-10-CM

## 2024-01-04 DIAGNOSIS — M25.519 SHOULDER PAIN, UNSPECIFIED CHRONICITY, UNSPECIFIED LATERALITY: Primary | ICD-10-CM

## 2024-01-04 DIAGNOSIS — E11.69 HYPERLIPIDEMIA ASSOCIATED WITH TYPE 2 DIABETES MELLITUS: ICD-10-CM

## 2024-01-04 DIAGNOSIS — E78.5 HYPERLIPIDEMIA ASSOCIATED WITH TYPE 2 DIABETES MELLITUS: ICD-10-CM

## 2024-01-04 PROCEDURE — 99214 OFFICE O/P EST MOD 30 MIN: CPT | Mod: S$GLB,,, | Performed by: INTERNAL MEDICINE

## 2024-01-04 PROCEDURE — 99999 PR PBB SHADOW E&M-EST. PATIENT-LVL IV: CPT | Mod: PBBFAC,,, | Performed by: INTERNAL MEDICINE

## 2024-01-04 RX ORDER — NAPROXEN 500 MG/1
500 TABLET ORAL 2 TIMES DAILY PRN
Qty: 40 TABLET | Refills: 0 | Status: SHIPPED | OUTPATIENT
Start: 2024-01-04 | End: 2024-01-24

## 2024-01-04 RX ORDER — SEMAGLUTIDE 1.34 MG/ML
1 INJECTION, SOLUTION SUBCUTANEOUS
Qty: 9 ML | Refills: 3 | Status: SHIPPED | OUTPATIENT
Start: 2024-01-04 | End: 2025-01-03

## 2024-01-04 RX ORDER — SEMAGLUTIDE 1.34 MG/ML
1 INJECTION, SOLUTION SUBCUTANEOUS
Qty: 3 ML | Refills: 11 | Status: SHIPPED | OUTPATIENT
Start: 2024-01-04 | End: 2024-01-04

## 2024-01-04 NOTE — PROGRESS NOTES
ENDOCRINOLOGY CLINIC    Subjective:      Chief Complaint:  Type 2 diabetes    HPI:   Linn Price is a 63 y.o. female who presents for follow up of type 2 diabetes. Last seen in the clinic on 9/18/2023.     Diabetes Hx:  Diagnosed w/ DM: Dx in her late 50s.   Complications:   Retinopathy:  Denies   Last eye exam: : 07/19/2023  Neuropathy: No paresthesias.   Last foot exam: : 05/16/2023  Nephropathy: No  Cardiovascular: Denies  Gastroparesis: No  DKA/HHS: No    Severe Hypoglycemia: No, Hypoglycemia unawareness: No  Hypoglycemic episodes:  Patient denies any low blood sugars recently.    Current meds:   Metformin  mg, 2 tablets daily  Ozempic 0.5 mg once a week. Tolerating well.     Compliance with meds: No     Previous meds:  Trulicity - d/c after her blood sugars improved.      Home glucose checks:   Compliant: Yes, Once a day random  Reports blood sugars mostly at goal.     Diet/Exercise:   Takes a milk shake in the morning  Usually eats 2-3 meals a day.   Dinner at 6:30 pm, no bedtime snacks  Snacks on yogurt, hummus, cucumber during the day.  Does report occasional dietary non compliance.   No regular exercise at this time.       Diabetes education:  10/2/2023      Last A1c:   Lab Results   Component Value Date    HGBA1C 6.6 (H) 10/06/2023    HGBA1C 12.4 (H) 06/14/2023    HGBA1C 7.1 (H) 06/20/2022       Microalbumin:   Lab Results   Component Value Date    LABMICR 11.0 06/14/2023    CREATRANDUR 54.0 06/14/2023    MICALBCREAT 20.4 06/14/2023     Lab Results   Component Value Date    EGFRNORACEVR >60.0 10/06/2023    CREATININE 0.8 10/06/2023     Lipids:   Lab Results   Component Value Date    CHOL 198 06/14/2023    TRIG 101 06/14/2023    HDL 60 06/14/2023    LDLCALC 117.8 06/14/2023    CHOLHDL 30.3 06/14/2023     TSH:  Lab Results   Component Value Date    TSH 0.965 06/14/2023     Vitamin B12    Lab Results   Component Value Date    HGB 13.1 06/14/2023        Aspirin: Yes  Statins: Never used statins.   Patient was prescribed rosuvastatin but does not want start it due to side effects.   ACEI/ARB: No, Lisinopril d/c - had lip swelling  Fatty liver: Yes  HTN: Will check with PCP.     Denies history of pancreatitis or medullary thyroid cancer.  History recurrent UTI: No  History of recurrent fungal infection: No    Polyuria: No  Polydipsia: No      FHx of DM: Yes  Heart disease: Yes    ROS: see HPI       Objective:       Physical Exam     BP (!) 165/90 (BP Location: Right arm, Patient Position: Sitting, BP Method: Large (Automatic))   Pulse 78   Wt 97.1 kg (214 lb 1.1 oz)   LMP  (LMP Unknown) Comment:    2009  SpO2 100%   BMI 35.62 kg/m²     Wt Readings from Last 3 Encounters:   01/04/24 97.1 kg (214 lb 1.1 oz)   10/10/23 91.8 kg (202 lb 6.1 oz)   09/29/23 89.9 kg (198 lb 3.1 oz)       Constitutional:  Pleasant,  in no acute distress.   HENT:   Head:    Normocephalic and atraumatic.   Eyes:    EOMI. No scleral icterus.   Cardiovascular:  Normal rate  Respiratory:   Effort normal   Neurological:  No tremor  Skin:    Skin is warm, dry  Extremity:  No edema    DIABETIC FOOT EXAM: 9/18/2023 - normal sensation to microfilament testing bilaterally.  No fissures, wounds, or ulcers.      LABORATORY REVIEW:  See HPI for other labs reviewed today      Chemistry        Component Value Date/Time     10/06/2023 0918    K 4.2 10/06/2023 0918     10/06/2023 0918    CO2 28 10/06/2023 0918    BUN 15 10/06/2023 0918    BUN 13.0 06/20/2022 1528    CREATININE 0.8 10/06/2023 0918     (H) 10/06/2023 0918        Component Value Date/Time    CALCIUM 9.5 10/06/2023 0918    ALKPHOS 96 10/06/2023 0918    AST 16 10/06/2023 0918    ALT 13 10/06/2023 0918    BILITOT 0.4 10/06/2023 0918    ESTGFRAFRICA 102 05/01/2021 0828    EGFRNONAA 88 05/01/2021 0828          Lab Results   Component Value Date    HGBA1C 6.6 (H) 10/06/2023    HGBA1C 12.4 (H) 06/14/2023    HGBA1C 7.1 (H) 06/20/2022     Other labs reviewed today in  HPI    Assessment/Plan:       Problem List Items Addressed This Visit          Cardiac/Vascular    Hyperlipidemia associated with type 2 diabetes mellitus       Patient denies taking any statins and does not want start it.   Discussed increased risk of ASCVD with diabetes.           Relevant Medications    semaglutide (OZEMPIC) 1 mg/dose (4 mg/3 mL)       Endocrine    Uncontrolled type 2 diabetes mellitus with hyperglycemia, without long-term current use of insulin - Primary       Last A1c had improved to 6.6. Repeat A1c.   Discussed increasing her Ozempic to 1 mg weekly.   Continue her metformin.   Call if any side effects from the medications    Discussed goal A1c of 6.5-7%  Call if blood sugars are persistently less than  70 or greater than 200.     Follow up with diabetes educator.     Discussed importance of diet and lifestyle modifications for diabetes management  Hypoglycemia management discussed. Carry glucose tablets or snacks at all times.     Complications:  Follow up for regular diabetes eye exam  Daily self examination of feet  Microalbumin: Monitor.                Relevant Medications    semaglutide (OZEMPIC) 1 mg/dose (4 mg/3 mL)    Other Relevant Orders    Hemoglobin A1C      Follow up in about 3 months (around 4/4/2024).     Heidy Coy MD

## 2024-01-04 NOTE — PATIENT INSTRUCTIONS
Change in your diabetes medications:   Metformin  mg , 2 tablets daily  Ozempic 1 mg once a week    Call if you have any side effects from the medication:   Ozempic:  Nausea, vomiting, diarrhea, constipation, decreased appetite, abdominal pain  Metformin:  Nausea, diarrhea    Check blood sugars before meals and bedtime.   Call if blood sugars are frequently less than 70 or above 200.  Brisk walk 30 minutes a day, 5 days a week    Call if you need prescriptions for medications.  Carry glucose tablets or snacks with you at all times.     Follow-up in 3-4 months.

## 2024-01-10 ENCOUNTER — PATIENT MESSAGE (OUTPATIENT)
Dept: ADMINISTRATIVE | Facility: HOSPITAL | Age: 64
End: 2024-01-10
Payer: MEDICARE

## 2024-01-10 ENCOUNTER — PATIENT OUTREACH (OUTPATIENT)
Dept: ADMINISTRATIVE | Facility: HOSPITAL | Age: 64
End: 2024-01-10
Payer: MEDICARE

## 2024-01-10 NOTE — PROGRESS NOTES

## 2024-01-22 NOTE — ASSESSMENT & PLAN NOTE
Last A1c had improved to 6.6. Repeat A1c.   Discussed increasing her Ozempic to 1 mg weekly.   Continue her metformin.   Call if any side effects from the medications    Discussed goal A1c of 6.5-7%  Call if blood sugars are persistently less than  70 or greater than 200.     Follow up with diabetes educator.     Discussed importance of diet and lifestyle modifications for diabetes management  Hypoglycemia management discussed. Carry glucose tablets or snacks at all times.     Complications:  Follow up for regular diabetes eye exam  Daily self examination of feet  Microalbumin: Monitor.

## 2024-01-23 ENCOUNTER — PATIENT MESSAGE (OUTPATIENT)
Dept: ADMINISTRATIVE | Facility: HOSPITAL | Age: 64
End: 2024-01-23
Payer: MEDICARE

## 2024-01-23 ENCOUNTER — PATIENT OUTREACH (OUTPATIENT)
Dept: ADMINISTRATIVE | Facility: HOSPITAL | Age: 64
End: 2024-01-23
Payer: MEDICARE

## 2024-01-23 NOTE — PROGRESS NOTES

## 2024-02-26 ENCOUNTER — PATIENT MESSAGE (OUTPATIENT)
Dept: PRIMARY CARE CLINIC | Facility: CLINIC | Age: 64
End: 2024-02-26
Payer: MEDICARE

## 2024-02-26 ENCOUNTER — PATIENT OUTREACH (OUTPATIENT)
Dept: PRIMARY CARE CLINIC | Facility: CLINIC | Age: 64
End: 2024-02-26
Payer: MEDICARE

## 2024-02-26 NOTE — PROGRESS NOTES

## 2024-03-06 ENCOUNTER — PATIENT OUTREACH (OUTPATIENT)
Dept: ADMINISTRATIVE | Facility: HOSPITAL | Age: 64
End: 2024-03-06
Payer: MEDICARE

## 2024-03-06 ENCOUNTER — TELEPHONE (OUTPATIENT)
Dept: PRIMARY CARE CLINIC | Facility: CLINIC | Age: 64
End: 2024-03-06
Payer: MEDICARE

## 2024-03-06 VITALS — SYSTOLIC BLOOD PRESSURE: 131 MMHG | DIASTOLIC BLOOD PRESSURE: 76 MMHG

## 2024-03-06 NOTE — PROGRESS NOTES

## 2024-04-05 ENCOUNTER — LAB VISIT (OUTPATIENT)
Dept: LAB | Facility: HOSPITAL | Age: 64
End: 2024-04-05
Attending: INTERNAL MEDICINE
Payer: MEDICARE

## 2024-04-05 DIAGNOSIS — E11.9 TYPE 2 DIABETES MELLITUS WITHOUT COMPLICATION, WITHOUT LONG-TERM CURRENT USE OF INSULIN: ICD-10-CM

## 2024-04-05 DIAGNOSIS — E04.2 MULTINODULAR GOITER: ICD-10-CM

## 2024-04-05 DIAGNOSIS — E78.5 HYPERLIPIDEMIA, UNSPECIFIED HYPERLIPIDEMIA TYPE: ICD-10-CM

## 2024-04-05 DIAGNOSIS — L50.8 CHRONIC URTICARIA: ICD-10-CM

## 2024-04-05 LAB
ALBUMIN SERPL BCP-MCNC: 3.7 G/DL (ref 3.5–5.2)
ALP SERPL-CCNC: 104 U/L (ref 55–135)
ALT SERPL W/O P-5'-P-CCNC: 23 U/L (ref 10–44)
ANION GAP SERPL CALC-SCNC: 9 MMOL/L (ref 8–16)
AST SERPL-CCNC: 19 U/L (ref 10–40)
BILIRUB SERPL-MCNC: 0.4 MG/DL (ref 0.1–1)
BUN SERPL-MCNC: 16 MG/DL (ref 8–23)
CALCIUM SERPL-MCNC: 9.7 MG/DL (ref 8.7–10.5)
CHLORIDE SERPL-SCNC: 104 MMOL/L (ref 95–110)
CHOLEST SERPL-MCNC: 167 MG/DL (ref 120–199)
CHOLEST/HDLC SERPL: 3.5 {RATIO} (ref 2–5)
CO2 SERPL-SCNC: 26 MMOL/L (ref 23–29)
CREAT SERPL-MCNC: 0.8 MG/DL (ref 0.5–1.4)
EST. GFR  (NO RACE VARIABLE): >60 ML/MIN/1.73 M^2
ESTIMATED AVG GLUCOSE: 123 MG/DL (ref 68–131)
GLUCOSE SERPL-MCNC: 112 MG/DL (ref 70–110)
HBA1C MFR BLD: 5.9 % (ref 4–5.6)
HDLC SERPL-MCNC: 48 MG/DL (ref 40–75)
HDLC SERPL: 28.7 % (ref 20–50)
LDLC SERPL CALC-MCNC: 98 MG/DL (ref 63–159)
NONHDLC SERPL-MCNC: 119 MG/DL
POTASSIUM SERPL-SCNC: 4.4 MMOL/L (ref 3.5–5.1)
PROT SERPL-MCNC: 7.7 G/DL (ref 6–8.4)
SODIUM SERPL-SCNC: 139 MMOL/L (ref 136–145)
THYROGLOB AB SERPL IA-ACNC: 33.3 IU/ML (ref 0–3.9)
THYROPEROXIDASE IGG SERPL-ACNC: 32 IU/ML
TRIGL SERPL-MCNC: 105 MG/DL (ref 30–150)

## 2024-04-05 PROCEDURE — 80061 LIPID PANEL: CPT | Performed by: INTERNAL MEDICINE

## 2024-04-05 PROCEDURE — 83036 HEMOGLOBIN GLYCOSYLATED A1C: CPT | Performed by: INTERNAL MEDICINE

## 2024-04-05 PROCEDURE — 80053 COMPREHEN METABOLIC PANEL: CPT | Performed by: INTERNAL MEDICINE

## 2024-04-05 PROCEDURE — 86376 MICROSOMAL ANTIBODY EACH: CPT | Performed by: INTERNAL MEDICINE

## 2024-04-05 PROCEDURE — 36415 COLL VENOUS BLD VENIPUNCTURE: CPT | Performed by: INTERNAL MEDICINE

## 2024-04-05 PROCEDURE — 86800 THYROGLOBULIN ANTIBODY: CPT | Performed by: INTERNAL MEDICINE

## 2024-04-05 NOTE — PROGRESS NOTES
I sent pt a my chart message -  I reviewed your  labs.  Your Ha1c was controlled at 5.9.  Your thyroid antibodies were elevated consistent with autoimmune thyroid disease. Your Cholesterol looked slightly high.  Your goal LDL (bad cholesterol) is < 70.  I would again recommend you reconsider trying Crestor. Let me know if you have changed your mind and I can send you a prescription. Your kidney function and liver functions looked good.   No further recommendations at this time.  Dr. YEAGER

## 2024-04-07 NOTE — PROGRESS NOTES
[facility] Note    Chief Complaint      Chief Complaint   Patient presents with    Follow-up       History of Present Illness      Linn Price is a 64 y.o.   WF with HTN, DM - II, JANAE on CPAP, Chronic Constipation, GERD, Colon polyps,Pulm nodule, Noncompliance, and Nicotine Dependence in Remission presents to fu well visit and chronic issues. Last virtual visit - 10/10/23    Chronic Urticaria/Angioedema - UC - 8/16/23 - Allergic Rx/lip swelling - Rx Benadryl and allegra. Fu by A/I, Dr. Cooper. Pt on Zyrtec 2 tabs BID. She just had allergy testing.  She was started on Lisinopril in June 2023.  Sx's preceded this medication. No trouble breathign or swallowing. +anti TPO - 32 and + antithyroglobulin - 33.  Is she a candidate for Xolair? We stopped Lisinopril due to chronic Hives.  Digital BP program started amlodipine 5 mg 12/20/23. Pt had another angioedema flare 2 wks ago for which she did not go to ED or seek care. She took benadryl. She takes antihistamine BID.      Noncompliance -   Pt reports she is no longer missing her meds.     Pulm nodules - CT lung Ca screen - 6/23/23 - A Stable 7 mm pleural base nodule right middle lobe.  Stable tiny scattered nodules measuring up to 3 mm in the right lung. Minimal atherosclerosis (plaque in the arteries). Age-appropriate degenerative changes (Arthritis). Hepatic steatosis (Fatty Liver) and hepatomegaly (slight liver enlargement likely due to your fatty Liver). We  will continue annual screening with repeat Low dose CT in 1 year.      Thyroid nodule - Thyroid u/s - 10/16/23 - Stable subcentimeter (9mm and 6 mm) Multinodular goiter which does not meet criteria for fine-needle aspiration.  +anti TPO - 32 and + antithyroglobulin - 33. Pt euthyroid.      COPD - Pt with prev PFT's at  - + Obstruction. Cont prn albuterol - has not needed.  +SOB with house work.       Nicotine Dependence -in remission - Pt quit 1/12/20. Congratulated on cessation.  Repeat  CT lung Ca screening.      HTN - She saw [facility], Dr. Holloway/Dr. Gomes.  Recent CT showed - A small pericardial effusion is present.  Started amlodipine 2.5 mg/d which she stopped because she thought it was worsening her Skin condition. Stopping it did not change the skin issues.  She is fu by digital BP monitoring program.   Cont to monitor.   Pt fu A/I carnes for recent food allergies with lip swelling first. Pt would like a fluid pill to help with intermittent edema. She will monitor BP closely and alert me for any concerns.  Pt off HCTZ 12.5 mg daily prn edema. Saw Dr. Holloway and was started on Lisinopril and crestor (she did not start Crestor). Has fu in sept.  She will discuss her recent recs from Dr. Asher with Dr. Holloway. She reports being told she is a candidate for ablation.  Echo - 6/23/23 - mild concentric hypertrophy and normal systolic function. EF 65%; Grade I Diastolic dysf. Mild aortic regurg. Exercise Stress Test - 6/26/23  - neg.     Left renal cyst - 1.8 cm simple parapelvic cyst. Lower pole Left kidney. Reassured.      DM - II - Ha1c is controlled at 5.9 - 4/5/24.  Cont Metformin  mg 2 tabs po QHS and Ozempic 1 mg/wk. She had not bumped Metformin to 3 tabs. No evidence of microalbuminuria.  She reports some constipation.  Rec inc hydration and try stool softeners or Miralax prn. Has a BM Q3-4 days.  Has fu with Dr. Zbigniew Weeks, in Dec.  No microalbuminuria - 4/5/24     JANAE on CPAP - She uses APAP it nightly x 6-7 hrs. Rec low carb diet and exercise for wt loss. Doing well. +fatigue.      Chronic constipation - Doing well at present.  Rec adeq hydration.   Rec adeq hydration and Miralax prn. Al with diarrhea.      Colon polyps - Fu by Dr. Swain/Magen.  repeat 9/2026      Hemorrhoids - Fu by Dr. Tracy.  Cont conservative Mgmnt.      GERD - Rare. Rec reflux prec. Can take Pepcid prn-rarely needs it.      Vit D def - 30 up from 14- Pt completed Ergocalciferol once weekly. Pt currently on Vit D3  2000 u/d.       Dishydrotic Eczema- Dx via Bx. Prev fu by Derm - Dr. Sky. Doing better.  She is on light therapy. She can walk and use shoes now.  She is also on topicals.      Chronic Otitis Externa - Fu by ENT, Dr. David.  Pt on Acetic acid drops prn.      Obesity - BMI - 34.34 up 4 lb since last visit. Rec low carb diet and exercising.  Plans to resume exercise     HLD - Pt has not started her Crestor that cards started. She declines. I asked her to reconsider. Atherosclerosis including coronary arteries.  She is on Aspirin 81mg. She declines statin for now.  Her cholesterol was not quite controlled Her goal LDL is < 70. Cont to monitor.     HDL 48 LDL 98 - 4/5/24     Normocytic anemia - H/H - 13/41 - cont to monitor. Planning for  C-scope     Neck pain x 6-8 mos- She is in Ptx for her neck. C/o stiffness. No numbness/tingling/weakness. + DDD. Fu by [facility]. MRI C spine - 2/16/24- mild spinal stenosis. Ptx was no help.      Lab review:   5/22/20 - WBC - 7.6;  H/H - 12.1/36.3; Plt - 338;  BUN/Cr - 12/0.7; LFt's - wnl;   TG 80; Ha1c - 6.9; TSH - 1.52;  Vit D -14      HCM - Flu - none - refuses;  Tdap -2/1/22;  PCV 13 - none - declines;  PVX 23 - none - declines;  Shingrix - none - declines;  COVID -19 vaccine #1 - declines; MGM - 6/7/23  -repeat 1 yr;   DEXA - 8/15/23 - wnl;  PAP -7/6/21 - neg; Hep C Screen - 6/20/22 - neg;  HIV Screen - none - defers; C-scope - 8/30/23 -polyps and hemorrhoids -  repeat 3 yrs;  Prev PCP - me at [facility] and then Dr. Armenta; Prev OB/GYN - Dr. Catalan;  GI - Dr. Swain; A/I - Dr. Cooper;  ENT- Dr. David; Derm - Dr. Downey; Ophtho - Dr. Shin; Rheum - Dr. Campoverde; Cards - ? At [facility]; well visit - 1/21/22     Patient Care Team:  Erika Mcclure MD as PCP - General (Internal Medicine)  Brent Wilson MD as Obstetrician (Obstetrics)  Santana Swain MD as Consulting Physician (Gastroenterology)  Daniel Bynum MD as Consulting Physician (General  Surgery)  Eugene Fontana Jr., MD as Consulting Physician (Otolaryngology)  Chong Tang MD as Consulting Physician (Ophthalmology)  Melissa Austin MA as Care Coordinator  Julianne Narvaez as Digital Medicine Health   Charlotte Diaz, PharmD as Hypertension Digital Medicine Clinician  Erika Mcclure MD as Hypertension Digital Medicine Responsible Provider (Internal Medicine)  Charlotte Diaz PharmD as Diabetes Digital Medicine Clinician  Erika Mcclure MD as Diabetes Digital Medicine Responsible Provider (Internal Medicine)     Health Maintenance:  Immunization History   Administered Date(s) Administered    Tetanus 02/01/2022      Health Maintenance   Topic Date Due    Aspirin/Antiplatelet Therapy  Never done    High Dose Statin  Never done    Foot Exam  05/16/2024    Mammogram  06/07/2024    LDCT Lung Screen  06/23/2024    Eye Exam  07/19/2024    Hemoglobin A1c  10/05/2024    Lipid Panel  04/05/2025    DEXA Scan  08/15/2025    Colorectal Cancer Screening  09/05/2026    TETANUS VACCINE  02/01/2032    Hepatitis C Screening  Completed    Shingles Vaccine  Discontinued        Past Medical History:  Past Medical History:   Diagnosis Date    Allergy     Benign hypertension 08/19/2014    Broken fingers     Broken toe     Colon polyps 08/19/2014    Diabetes mellitus     Eczema     Elevated fasting glucose 08/19/2014    Family history of bladder cancer     Family history of heart disease 08/19/2014    History of broken nose     Menopause 2009    JANAE on CPAP 11/15/2007    Per PSG: Rx'd CPAP, pressure 13, using small Comfort Select mask or interface of patient's choice, chin strap, and heated humidifier      Pulmonary nodule 12/01/2020    Pyloric stenosis     as a child    Reflux esophagitis 08/19/2014    Per EGD 8/9/2006      Tobacco abuse     Ulcer     at 14 y.o.    Urticaria        Past Surgical History:   has a past surgical history that includes Gastric restriction surgery; [place] tooth extraction;  Upper gastrointestinal endoscopy (2006); Colonoscopy (2018); and Tonsillectomy.    Family History:  family history includes Bladder Cancer in her maternal uncle; COPD in her sister; Dementia in her mother; Diabetes in her father and sister; Hypertension in her brother, mother, and sister; Kidney disease in her mother; Macular degeneration in her mother; Prostate cancer in her father; Psoriasis in her mother; Stroke (age of onset: 60) in her mother; Stroke (age of onset: 75) in her father.     Social History:  Social History     Tobacco Use    Smoking status: Former     Current packs/day: 0.00     Average packs/day: 1.5 packs/day for 46.8 years (70.2 ttl pk-yrs)     Types: Cigarettes     Start date: 3/17/1973     Quit date: 2020     Years since quittin.2     Passive exposure: Past    Smokeless tobacco: Never   Substance Use Topics    Alcohol use: Yes     Comment: social - rare    Drug use: Never       Review of Systems   Constitutional:  Negative for chills, diaphoresis and fever.   HENT:  Positive for ear discharge, ear pain and hearing loss. Negative for nasal congestion.         Uses drops. Planning ENT fu   Eyes:  Negative for visual disturbance.   Respiratory:  Negative for cough and shortness of breath.    Cardiovascular:  Negative for chest pain, palpitations and leg swelling.   Gastrointestinal:  Positive for constipation, diarrhea, nausea and reflux. Negative for abdominal pain and vomiting.   Endocrine: Negative for cold intolerance, heat intolerance and polydipsia.   Genitourinary:  Negative for bladder incontinence, difficulty urinating, frequency and vaginal bleeding.   Musculoskeletal:  Positive for leg pain. Negative for arthralgias and myalgias.        Rt calf at night. Not with walking.    Neurological:  Negative for dizziness, numbness and headaches.   Psychiatric/Behavioral:  Negative for dysphoric mood. The patient is not nervous/anxious.         Medications:    Current Outpatient  Medications:     acetic acid (VOSOL) 2 % otic solution, Place into both ears., Disp: , Rfl:     albuterol (PROVENTIL/VENTOLIN HFA) 90 mcg/actuation inhaler, INHALE 2 PUFFS BY MOUTH EVERY 6 HOURS AS NEEDED FOR WHEEZING. USE WITH SPACER, Disp: 54 g, Rfl: 1    amLODIPine (NORVASC) 5 MG tablet, Take 1 tablet (5 mg total) by mouth once daily., Disp: 90 tablet, Rfl: 0    ascorbic acid, vitamin C, (VITAMIN C) 100 MG tablet, Take 100 mg by mouth once daily., Disp: , Rfl:     blood sugar diagnostic (ONETOUCH ULTRA TEST) Strp, USE TO TEST BLOOD GLUCOSE ONCE DAILY, Disp: 100 strip, Rfl: 1    blood sugar diagnostic Strp, To check BG 2 times daily, to use with insurance preferred meter, Disp: 200 strip, Rfl: 3    blood sugar diagnostic Strp, 1 each by Misc.(Non-Drug; Combo Route) route 2 (two) times a day., Disp: 200 each, Rfl: 3    clobetasol 0.05% (TEMOVATE) 0.05 % Oint, AAA on hands and feet BID  x 1-2 wks then prn flares only, Disp: 60 g, Rfl: 3    coenzyme Q10 (COQ-10) 100 mg capsule, Take 2 once daily, Disp: 1 capsule, Rfl: 0    diphenhydrAMINE (BENADRYL) 50 MG capsule, Take 1 capsule (50 mg total) by mouth every 6 (six) hours as needed for Itching or Allergies., Disp: 30 capsule, Rfl: 0    fexofenadine (ALLEGRA) 180 MG tablet, Take 1 tablet (180 mg total) by mouth once daily., Disp: 10 tablet, Rfl: 0    lancets (ONETOUCH DELICA PLUS LANCET) 30 gauge Misc, TO CHECK BLOOD GLUCOSE ONCE DAILY, Disp: 100 each, Rfl: 1    metFORMIN (GLUCOPHAGE-XR) 500 MG ER 24hr tablet, TAKE 2 TABLETS BY MOUTH EVERY NIGHT AT BEDTIME, Disp: 180 tablet, Rfl: 1    ONETOUCH DELICA PLUS LANCET 30 gauge Misc, TO CHECK BLOOD GLUCOSE ONCE DAILY, Disp: , Rfl:     ONETOUCH ULTRA2 METER Misc, USE TO CHECK BLOOD GLUCOSE ONCE DAILY, Disp: , Rfl:     semaglutide (OZEMPIC) 1 mg/dose (4 mg/3 mL), Inject 1 mg into the skin every 7 days., Disp: 9 mL, Rfl: 3    sulfacetamide sodium 10% (BLEPH-10) 10 % ophthalmic solution, 1 drop as needed., Disp: , Rfl:      "tiZANidine (ZANAFLEX) 4 MG tablet, Take 1 tablet (4 mg total) by mouth every 8 (eight) hours as needed (muscle spasms)., Disp: 60 tablet, Rfl: 2    triamcinolone acetonide 0.1% (KENALOG) 0.1 % cream, Apply topically as needed., Disp: , Rfl:     vitamin D (VITAMIN D3) 1000 units Tab, Take 1,000 Units by mouth once daily., Disp: , Rfl:     aspirin 81 MG Chew, Take 1 tablet (81 mg total) by mouth once daily., Disp: , Rfl: 0    calcipotriene (DOVONOX) 0.005 % cream, Apply topically 2 (two) times daily. To affected areas on hands and feet., Disp: 120 g, Rfl: 3    EPINEPHrine (EPIPEN) 0.3 mg/0.3 mL AtIn, Inject 0.3 mLs (0.3 mg total) into the muscle once. for 1 dose, Disp: 2 each, Rfl: 0    lancets Misc, To check BG 2 times daily, to use with insurance preferred meter (Patient not taking: Reported on 4/9/2024), Disp: 200 each, Rfl: 2    multivitamin capsule, Take 1 capsule by mouth once daily. (Patient not taking: Reported on 4/9/2024), Disp: , Rfl:      Allergies:  Review of patient's allergies indicates:   Allergen Reactions    Codeine Other (See Comments)     psychosis    Lisinopril Swelling     angioedema       Physical Exam      Vital Signs  Temp: 98.2 °F (36.8 °C)  Temp Source: Oral  Pulse: 75  SpO2: 97 %  BP: 130/64  BP Location: Left arm  Patient Position: Sitting  Pain Score: 0-No pain  Height and Weight  Height: 5' 5" (165.1 cm)  Weight: 93.6 kg (206 lb 5.6 oz)  BSA (Calculated - sq m): 2.07 sq meters  BMI (Calculated): 34.3  Weight in (lb) to have BMI = 25: 149.9      Patient Position: Sitting      Physical Exam  Vitals reviewed.   Constitutional:       General: She is not in acute distress.     Appearance: Normal appearance. She is not ill-appearing, toxic-appearing or diaphoretic.   HENT:      Head: Atraumatic.      Ears:      Comments: Dry skin to ear canals; debris to Left ear canal.  Eyes:      Extraocular Movements: Extraocular movements intact.      Conjunctiva/sclera: Conjunctivae normal.      Pupils: " Pupils are equal, round, and reactive to light.   Cardiovascular:      Rate and Rhythm: Normal rate and regular rhythm.      Pulses: Normal pulses.      Heart sounds: Normal heart sounds. No murmur heard.  Pulmonary:      Effort: No respiratory distress.      Breath sounds: Normal breath sounds. No wheezing.   Abdominal:      General: Bowel sounds are normal. There is no distension.      Palpations: Abdomen is soft.      Tenderness: There is no abdominal tenderness. There is no guarding or rebound.   Skin:     General: Skin is warm.   Neurological:      General: No focal deficit present.      Mental Status: She is alert and oriented to person, place, and time.   Psychiatric:         Mood and Affect: Mood normal.         Behavior: Behavior normal.          Laboratory:  CBC:  Recent Labs   Lab 06/20/22  1528 06/14/23  0900   WBC 8.1 7.93   RBC 4.12 L 4.86   Hemoglobin 11.3 L 13.1   Hematocrit 34.4 L 41.7   Platelets 349 333   MCV 83.6 86   MCH 27.6 27.0   MCHC 33.0 31.4 L       CMP:  Recent Labs   Lab 06/14/23  0900 07/05/23  1311 10/06/23  0918 04/05/24  0909   Glucose 337 H   < > 121 H 112 H   Calcium 9.3   < > 9.5 9.7   Albumin 3.7  --  3.8 3.7   Total Protein 7.4  --  7.8 7.7   Sodium 135 L   < > 140 139   Potassium 4.2   < > 4.2 4.4   CO2 22 L   < > 28 26   Chloride 100   < > 104 104   BUN 16   < > 15 16   Creatinine 0.9   < > 0.8 0.8   Alkaline Phosphatase 91  --  96 104   ALT 23  --  13 23   AST 16  --  16 19   Total Bilirubin 0.5  --  0.4 0.4    < > = values in this interval not displayed.        LIPIDS:  Recent Labs   Lab 07/12/21  0000 06/20/22  1528 06/14/23  0900 04/05/24  0909   TSH  --  1.40 0.965  --    HDL 62 56 60 48   Cholesterol 175 179 198 167   Triglycerides 82 86 101 105   LDL Cholesterol  --   --  117.8 98.0   LDL Calculated 100 109  --   --    HDL/Cholesterol Ratio  --   --  30.3 28.7   Non-HDL Cholesterol 113  --  138 119   Total Cholesterol/HDL Ratio  --   --  3.3 3.5       TSH:  Recent Labs    Lab 06/20/22  1528 06/14/23  0900   TSH 1.40 0.965       A1C:  Recent Labs   Lab 07/12/21  0000 06/20/22  1528 06/14/23  0900 10/06/23  0918 04/05/24  0909   Hemoglobin A1C 6.0 H 7.1 H 12.4 H 6.6 H 5.9 H       Urine Microalbumin/Cr:  Recent Labs   Lab 06/22/22  1614 06/14/23  0903 04/05/24  0956   Microalb/Creat Ratio 18 20.4 19.7         Other:   Recent Labs   Lab 07/06/21  1553   Vit D, 25-Hydroxy 30   Ferritin 121   Iron 38   Transferrin 280   TIBC 414   Saturated Iron 9 L     Recent Labs   Lab 06/20/22  1528   Hepatitis C Ab NON-REACTIVE       Assessment/Plan     Linn Price is a 64 y.o.female with:    Type 2 diabetes mellitus without complication, without long-term current use of insulin  - Controlled.  Cont current regimen.    Hyperlipidemia, unspecified hyperlipidemia type  - Pt declines statin.     Hypertension associated with diabetes  -     Comprehensive Metabolic Panel; Future; Expected date: 10/05/2024  - Controlled.  Cont current.     JANAE on CPAP  - cont compliance with CPAP.  Talk to [employer] and try to send me data to make sure no mask leak and AHI <5.    Class 1 obesity due to excess calories with serious comorbidity and body mass index (BMI) of 34.0 to 34.9 in adult   - Rec diet and exercise as discussed for wt loss.      Multinodular goiter  -     TSH; Future; Expected date: 10/05/2024  - Repeat TSH Pt with autoimmune thyroid dis.    Angioedema, sequela  - Still an issue despite antihistamine BID. Rec fu again with Dr. Cooper. Suspected to be immune mediated.     Chronic urticaria  - Still an issue despite antihistamine BID. Rec fu again with Dr. Cooper. Suspected to be immune mediated.     Chronic obstructive pulmonary disease, unspecified COPD type  - Stable.  Cont current regimen.    Aortic atherosclerosis  - She  declines statin. Cont. ASA.     Leg cramps  - Rec trial of magnesium oxide 400 mg/d.  Monitor for diarrhea.     Nicotine dependence in remission, unspecified nicotine  product type  -     CT Chest Lung Screening Low Dose; Future; Expected date: 06/24/2024         Chronic conditions status updated as per HPI.  Other than changes above, cont current medications and maintain follow up with specialists.   Follow up in about 6 months (around 10/9/2024) for fu chronic issues or sooner if needed.      Erika Mcclure MD  [facility]

## 2024-04-09 ENCOUNTER — OFFICE VISIT (OUTPATIENT)
Dept: PRIMARY CARE CLINIC | Facility: CLINIC | Age: 64
End: 2024-04-09
Payer: MEDICARE

## 2024-04-09 VITALS
SYSTOLIC BLOOD PRESSURE: 130 MMHG | HEART RATE: 75 BPM | DIASTOLIC BLOOD PRESSURE: 64 MMHG | OXYGEN SATURATION: 97 % | HEIGHT: 65 IN | TEMPERATURE: 98 F | WEIGHT: 206.38 LBS | BODY MASS INDEX: 34.38 KG/M2

## 2024-04-09 DIAGNOSIS — E11.9 TYPE 2 DIABETES MELLITUS WITHOUT COMPLICATION, WITHOUT LONG-TERM CURRENT USE OF INSULIN: Primary | ICD-10-CM

## 2024-04-09 DIAGNOSIS — E66.09 CLASS 1 OBESITY DUE TO EXCESS CALORIES WITH SERIOUS COMORBIDITY AND BODY MASS INDEX (BMI) OF 34.0 TO 34.9 IN ADULT: ICD-10-CM

## 2024-04-09 DIAGNOSIS — I15.2 HYPERTENSION ASSOCIATED WITH DIABETES: ICD-10-CM

## 2024-04-09 DIAGNOSIS — J44.9 CHRONIC OBSTRUCTIVE PULMONARY DISEASE, UNSPECIFIED COPD TYPE: ICD-10-CM

## 2024-04-09 DIAGNOSIS — F17.201 NICOTINE DEPENDENCE IN REMISSION, UNSPECIFIED NICOTINE PRODUCT TYPE: ICD-10-CM

## 2024-04-09 DIAGNOSIS — E11.59 HYPERTENSION ASSOCIATED WITH DIABETES: ICD-10-CM

## 2024-04-09 DIAGNOSIS — E78.5 HYPERLIPIDEMIA, UNSPECIFIED HYPERLIPIDEMIA TYPE: ICD-10-CM

## 2024-04-09 DIAGNOSIS — L50.8 CHRONIC URTICARIA: ICD-10-CM

## 2024-04-09 DIAGNOSIS — E04.2 MULTINODULAR GOITER: ICD-10-CM

## 2024-04-09 DIAGNOSIS — T78.3XXS ANGIOEDEMA, SEQUELA: ICD-10-CM

## 2024-04-09 DIAGNOSIS — I70.0 AORTIC ATHEROSCLEROSIS: ICD-10-CM

## 2024-04-09 DIAGNOSIS — G47.33 OSA ON CPAP: ICD-10-CM

## 2024-04-09 DIAGNOSIS — R25.2 LEG CRAMPS: ICD-10-CM

## 2024-04-09 PROCEDURE — 1160F RVW MEDS BY RX/DR IN RCRD: CPT | Mod: CPTII,S$GLB,, | Performed by: INTERNAL MEDICINE

## 2024-04-09 PROCEDURE — 1159F MED LIST DOCD IN RCRD: CPT | Mod: CPTII,S$GLB,, | Performed by: INTERNAL MEDICINE

## 2024-04-09 PROCEDURE — 3078F DIAST BP <80 MM HG: CPT | Mod: CPTII,S$GLB,, | Performed by: INTERNAL MEDICINE

## 2024-04-09 PROCEDURE — 3008F BODY MASS INDEX DOCD: CPT | Mod: CPTII,S$GLB,, | Performed by: INTERNAL MEDICINE

## 2024-04-09 PROCEDURE — 3061F NEG MICROALBUMINURIA REV: CPT | Mod: CPTII,S$GLB,, | Performed by: INTERNAL MEDICINE

## 2024-04-09 PROCEDURE — 1101F PT FALLS ASSESS-DOCD LE1/YR: CPT | Mod: CPTII,S$GLB,, | Performed by: INTERNAL MEDICINE

## 2024-04-09 PROCEDURE — 3044F HG A1C LEVEL LT 7.0%: CPT | Mod: CPTII,S$GLB,, | Performed by: INTERNAL MEDICINE

## 2024-04-09 PROCEDURE — 99214 OFFICE O/P EST MOD 30 MIN: CPT | Mod: S$GLB,,, | Performed by: INTERNAL MEDICINE

## 2024-04-09 PROCEDURE — 3075F SYST BP GE 130 - 139MM HG: CPT | Mod: CPTII,S$GLB,, | Performed by: INTERNAL MEDICINE

## 2024-04-09 PROCEDURE — 99999 PR PBB SHADOW E&M-EST. PATIENT-LVL V: CPT | Mod: PBBFAC,,, | Performed by: INTERNAL MEDICINE

## 2024-04-09 PROCEDURE — 3066F NEPHROPATHY DOC TX: CPT | Mod: CPTII,S$GLB,, | Performed by: INTERNAL MEDICINE

## 2024-04-09 PROCEDURE — 3288F FALL RISK ASSESSMENT DOCD: CPT | Mod: CPTII,S$GLB,, | Performed by: INTERNAL MEDICINE

## 2024-04-09 PROCEDURE — 1126F AMNT PAIN NOTED NONE PRSNT: CPT | Mod: CPTII,S$GLB,, | Performed by: INTERNAL MEDICINE

## 2024-04-09 RX ORDER — NAPROXEN 500 MG/1
500 TABLET ORAL 2 TIMES DAILY PRN
COMMUNITY
Start: 2024-03-05 | End: 2024-04-09

## 2024-04-09 RX ORDER — ACETIC ACID 20.65 MG/ML
SOLUTION AURICULAR (OTIC)
COMMUNITY
Start: 2023-12-06

## 2024-04-26 NOTE — PROGRESS NOTES
"  Physical Therapy Daily Treatment Note/ Reassessment     Name: Linn Price  Clinic Number: [id number]    Therapy Diagnosis:        Encounter Diagnoses   Name Primary?    Chronic neck pain      Posture abnormality      Decreased strength of upper extremity      Impaired range of motion of cervical spine        Physician: Erika Mcclure MD     Physician Orders: PT Eval and Treat   Medical Diagnosis from Referral: M54.2,G89.29 (ICD-10-CM) - Chronic neck pain  Evaluation Date: 6/29/2023  Authorization Period Expiration: 12/31/2023  Plan of Care Expiration: 10/27/2023  Progress Note Due: 10/27/2023  Visit # / Visits authorized: 14/ 20  FOTO: 1/3      Precautions: Standard, Diabetes, blood thinners, and elevated blood glucose      Time In: 335 PM  Time Out: 415  Total Appointment Time (timed & untimed codes): 40 minutes    Subjective     Pt reports: more tired than anything today, she is feeling soreness in the neck and mid back this afternoon, exercises going well at home    She was  with home exercise program.   Response to previous treatment: decreased stiffness  Functional change: in progress    Pain: 0/10  Location: neck L > R     Objective     9/1/2023  CERVICAL AROM Pain/Dysfunction with Movement   Flexion   70%    Extension 10% Decreased thoracic spine extension   Right side bending   30%    Left side bending   30%    Right rotation 45% Rotation coupled with right SB   Left rotation 45% Rotation couple with left SB        Treatment      Linn received therapeutic exercises to develop strength, endurance, ROM, flexibility, posture, and core stabilization for 13 minutes including:    Upper trap stretch with upper extremity support and overpressure 3x30s both sides  Levator scapulae stretch with upper extremity support and overpressure 3x30s both sides  Prone on elbows chin tuck 20x 3 sec hold - NP  B ER + cervical rotation at wall RTB x15 each side  Seated chin tuck 3 sec hold x20  Open book 5" hold B " April 26, 2024     Patient: Antione Whittaker   YOB: 2013   Date of Visit: 4/26/2024       To Whom It May Concern:    Antione Whittaker was seen in my clinic on 4/26/2024 at 11:30 am. Please excuse Antione's parent for their absence from work on this day to make the appointment.    If you have any questions or concerns, please don't hesitate to call.         Sincerely,         Joanna Potts MD        CC: No Recipients   "x15 ea - added YT resistance today  Seated cervical extension w/ towel 10x 10 sec hold    NT:  Seated B cervical rotation w/towel 5 sec hold 2x10 ea  Serratus wall slides with arms in pillowcase X 20  Seated horizontal abduction  Standing RTB shld ext 3x10/3"     Lnin received the following manual therapy techniques: to the: C/S for 25 minutes, including:    CPA and UPA thoracic mobs, prone today C5-T5  Contract Relax upper trap   Seated MWM into R & L rotational glides with UPA grade 2-3  Seated UPA C5-T2    NT:  Prone CTJ MWM chin tuck + chin tuck with rotation    Linn participated in neuromuscular re-education activities to improve: Balance, Coordination, Kinesthetic, Sense, Proprioception, and Posture for 00 minutes. The following activities were included:      Linn participated in dynamic functional therapeutic activities to improve functional performance for 00 minutes, including:  UBE 4'/4' pulling/pushing  - not today   Jewels shld flexion 5'       Home Exercises Provided and Patient Education Provided     Education provided:   - Compliance with HEP     Written Home Exercises Provided: yes.  Exercises were reviewed and Linn was able to demonstrate them prior to the end of the session.  Linn demonstrated good  understanding of the education provided.     Assessment   Patient continues to show good improvement in range of motion into rotational movements but remains restricted in upper thoracic and lower cervical spine joint mobility. Functionally, she is showing good carryover and is having a good improvement in midscapular strength and is able to perform all activity with good control. Continue as scheduled and look to DC at end of scheduled visits      Pt prognosis is Good.   Pt will continue to benefit from skilled outpatient physical therapy to address the deficits listed in the problem list box on initial evaluation, provide pt/family education and to maximize pt's level of independence in the " home and community environment.     Pt's spiritual, cultural and educational needs considered and pt agreeable to plan of care and goals.    Anticipated barriers to physical therapy: none     Goals: Goals:  Short Term Goals: 4-6 weeks  1. Pt will be compliant with HEP 50% of prescribed amount.   MET  2. The pt to demo improvement in pain free cervical left rotation by 10% of full ROM.  Progressing  3.  Report decreased neck pain  <   / =  2 /10  to increase tolerance for adls, work.  MET  4. Pt will demo improvement in midline cervical alignment in sitting to improve posture.  Progressing     Long Term Goals: 8-10 weeks   Pt will be compliant with % of prescribed amount.  Progressing  Patient will improve their FOTO limitation score to at least 36% as evidence of clinically significant improvements in their function.  Progressing  Pt will demo improvement in 20% of cervical left rotation to equal R cervical rotation.  Progressing  The pt will report full participation in ADLs and IADLs without restrictions related to neck pain.  Progressing        Plan     Continue per established POC     Rogelio Duque, PT

## 2024-05-28 NOTE — PROGRESS NOTES
ENDOCRINOLOGY CLINIC    Subjective:      Chief Complaint:  Type 2 diabetes    HPI:   Linn Price is a 64 y.o. female who presents for follow up of type 2 diabetes.  Patient was last seen in the clinic on 1/4/2024.     Diabetes Hx:  Diagnosed w/ DM: Dx in her late 50s.   Complications:   Retinopathy:  Denies   Last eye exam: : 07/19/2023  Neuropathy: No paresthesias.   Last foot exam: : 05/16/2023  Nephropathy: Denies  Cardiovascular: Denies  Gastroparesis: Denies  DKA/HHS: Denies    Severe Hypoglycemia: Deies  Hypoglycemia unawareness: Denies  Hypoglycemic episodes: Patient denies any low blood sugars recently.    Current meds:   Metformin  mg, 2 tablets daily  Ozempic 1 mg once a week.  Says she started this dose about 4 weeks back and is tolerating well.      Compliance with meds: Yes     Previous meds:  Trulicity - d/c after her blood sugars improved.      Home glucose checks:   Compliant: Yes, Once a day random.    Diet/Exercise:   Takes a milk shake in the morning  Usually eats 2-3 meals a day.   Dinner at 6:30 pm, no bedtime snacks  Snacks on yogurt, hummus, cucumber during the day.    Has been drinking frequent Unsweetened iced tea.   Reports some dietary noncompliance recently, has been craving for sweets.  Joined a gym and will start working out.   She is swimming recently with a friend.      Diabetes education:  10/2/2023    Last A1c:   Lab Results   Component Value Date    HGBA1C 5.9 (H) 04/05/2024    HGBA1C 6.6 (H) 10/06/2023    HGBA1C 12.4 (H) 06/14/2023     Microalbumin:   Lab Results   Component Value Date    LABMICR 27.0 04/05/2024    CREATRANDUR 137.0 04/05/2024    MICALBCREAT 19.7 04/05/2024     Lab Results   Component Value Date    EGFRNORACEVR >60.0 04/05/2024    CREATININE 0.8 04/05/2024     Lipids:   Lab Results   Component Value Date    CHOL 167 04/05/2024    TRIG 105 04/05/2024    HDL 48 04/05/2024    LDLCALC 98.0 04/05/2024    CHOLHDL 28.7 04/05/2024     TSH:  Lab Results    Component Value Date    TSH 0.965 06/14/2023       Lab Results   Component Value Date    HGB 13.1 06/14/2023        Aspirin: Yes  Statins: Never used statins. Patient was prescribed rosuvastatin but does not want start it due to side effects.   ACEI/ARB: No, Lisinopril d/c - Had lip swelling    Fatty liver: Yes    FIB-4 Calculation: 0.78 at 5/29/2024  9:26 AM       HTN: On amlodipine    Denies history of pancreatitis or personal/FH of medullary thyroid cancer.  History recurrent UTI/fungal infection: Denies    Polyuria/Polydipsia: Denies    FHx of DM: Yes  Heart disease: Yes    ROS: see HPI       Objective:       Physical Exam     BP (!) 145/80 (BP Location: Right arm, Patient Position: Sitting)   Pulse 69   Wt 98 kg (216 lb 0.8 oz)   LMP  (LMP Unknown) Comment:    2009  SpO2 98%   BMI 35.95 kg/m²     Wt Readings from Last 3 Encounters:   05/29/24 98 kg (216 lb 0.8 oz)   04/09/24 93.6 kg (206 lb 5.6 oz)   01/04/24 97.1 kg (214 lb 1.1 oz)       Constitutional:  Pleasant,  in no acute distress.   HENT:   Head:    Normocephalic and atraumatic.   Eyes:    EOMI. No scleral icterus.   Cardiovascular:  Normal rate  Respiratory:   Effort normal   Neurological:  No tremor  Skin:    Skin is warm, dry  Extremity:  No edema    DIABETIC FOOT EXAM: 05/29/2024  - Normal sensation to microfilament testing bilaterally.  No fissures, wounds, or ulcers.    LABORATORY REVIEW:  See HPI for other labs reviewed today      Chemistry        Component Value Date/Time     04/05/2024 0909    K 4.4 04/05/2024 0909     04/05/2024 0909    CO2 26 04/05/2024 0909    BUN 16 04/05/2024 0909    BUN 13.0 06/20/2022 1528    CREATININE 0.8 04/05/2024 0909     (H) 04/05/2024 0909        Component Value Date/Time    CALCIUM 9.7 04/05/2024 0909    ALKPHOS 104 04/05/2024 0909    AST 19 04/05/2024 0909    ALT 23 04/05/2024 0909    BILITOT 0.4 04/05/2024 0909    ESTGFRAFRICA 102 05/01/2021 0828    EGFRNONAA 88 05/01/2021 0828           Lab Results   Component Value Date    HGBA1C 5.9 (H) 04/05/2024    HGBA1C 6.6 (H) 10/06/2023    HGBA1C 12.4 (H) 06/14/2023     Other labs reviewed today in HPI    Assessment/Plan:       Problem List Items Addressed This Visit          Cardiac/Vascular    Hyperlipidemia associated with type 2 diabetes mellitus       Patient denies taking any statins and does not want start it.   Discussed increased risk of ASCVD with diabetes.  Monitor lipids.                Endocrine    Type 2 diabetes mellitus with hyperglycemia, without long-term current use of insulin - Primary       Last A1c had improved to 5.9.   Continue her current diabetes regimen.    Reports some dietary noncompliance recently.  She had lost some weight but has gained it back.  Does not want to increase her Ozempic dose at this time and wants to work better on her diet and lifestyle modification.  Follow-up with the diabetes educator.    Discussed goal A1c of 6.5-7%  Call if blood sugars are persistently less than  70 or greater than 200.     Discussed importance of diet and lifestyle modifications for diabetes management  Hypoglycemia management discussed. Carry glucose tablets or snacks at all times.     Complications:  Follow up for regular diabetes eye exam  Daily self examination of feet  Microalbumin: Monitor.     Lab Results   Component Value Date    HGBA1C 5.9 (H) 04/05/2024              Relevant Orders    Ambulatory referral/consult to Diabetes Education    Class 2 severe obesity due to excess calories with serious comorbidity and body mass index (BMI) of 35.0 to 35.9 in adult       Continue Ozempic.  Encouraged good diet and lifestyle modification.            Follow-up in 4-5 months.    Heidy Coy MD

## 2024-05-29 ENCOUNTER — OFFICE VISIT (OUTPATIENT)
Dept: ENDOCRINOLOGY | Facility: CLINIC | Age: 64
End: 2024-05-29
Payer: MEDICARE

## 2024-05-29 VITALS
SYSTOLIC BLOOD PRESSURE: 145 MMHG | OXYGEN SATURATION: 98 % | WEIGHT: 216.06 LBS | BODY MASS INDEX: 35.95 KG/M2 | HEART RATE: 69 BPM | DIASTOLIC BLOOD PRESSURE: 80 MMHG

## 2024-05-29 DIAGNOSIS — E11.65 TYPE 2 DIABETES MELLITUS WITH HYPERGLYCEMIA, WITHOUT LONG-TERM CURRENT USE OF INSULIN: Primary | ICD-10-CM

## 2024-05-29 DIAGNOSIS — E11.69 HYPERLIPIDEMIA ASSOCIATED WITH TYPE 2 DIABETES MELLITUS: ICD-10-CM

## 2024-05-29 DIAGNOSIS — E78.5 HYPERLIPIDEMIA ASSOCIATED WITH TYPE 2 DIABETES MELLITUS: ICD-10-CM

## 2024-05-29 DIAGNOSIS — E66.01 CLASS 2 SEVERE OBESITY DUE TO EXCESS CALORIES WITH SERIOUS COMORBIDITY AND BODY MASS INDEX (BMI) OF 35.0 TO 35.9 IN ADULT: ICD-10-CM

## 2024-05-29 PROCEDURE — 1126F AMNT PAIN NOTED NONE PRSNT: CPT | Mod: CPTII,S$GLB,, | Performed by: INTERNAL MEDICINE

## 2024-05-29 PROCEDURE — 1160F RVW MEDS BY RX/DR IN RCRD: CPT | Mod: CPTII,S$GLB,, | Performed by: INTERNAL MEDICINE

## 2024-05-29 PROCEDURE — 99214 OFFICE O/P EST MOD 30 MIN: CPT | Mod: S$GLB,,, | Performed by: INTERNAL MEDICINE

## 2024-05-29 PROCEDURE — 3008F BODY MASS INDEX DOCD: CPT | Mod: CPTII,S$GLB,, | Performed by: INTERNAL MEDICINE

## 2024-05-29 PROCEDURE — 3288F FALL RISK ASSESSMENT DOCD: CPT | Mod: CPTII,S$GLB,, | Performed by: INTERNAL MEDICINE

## 2024-05-29 PROCEDURE — 99999 PR PBB SHADOW E&M-EST. PATIENT-LVL V: CPT | Mod: PBBFAC,,, | Performed by: INTERNAL MEDICINE

## 2024-05-29 PROCEDURE — 3061F NEG MICROALBUMINURIA REV: CPT | Mod: CPTII,S$GLB,, | Performed by: INTERNAL MEDICINE

## 2024-05-29 PROCEDURE — 3077F SYST BP >= 140 MM HG: CPT | Mod: CPTII,S$GLB,, | Performed by: INTERNAL MEDICINE

## 2024-05-29 PROCEDURE — 1101F PT FALLS ASSESS-DOCD LE1/YR: CPT | Mod: CPTII,S$GLB,, | Performed by: INTERNAL MEDICINE

## 2024-05-29 PROCEDURE — 3066F NEPHROPATHY DOC TX: CPT | Mod: CPTII,S$GLB,, | Performed by: INTERNAL MEDICINE

## 2024-05-29 PROCEDURE — 3079F DIAST BP 80-89 MM HG: CPT | Mod: CPTII,S$GLB,, | Performed by: INTERNAL MEDICINE

## 2024-05-29 PROCEDURE — 3044F HG A1C LEVEL LT 7.0%: CPT | Mod: CPTII,S$GLB,, | Performed by: INTERNAL MEDICINE

## 2024-05-29 PROCEDURE — 1159F MED LIST DOCD IN RCRD: CPT | Mod: CPTII,S$GLB,, | Performed by: INTERNAL MEDICINE

## 2024-05-29 NOTE — PATIENT INSTRUCTIONS
Continue current diabetes medications.    Call if you have any side effects from the medication:   Ozempic:  Nausea, vomiting, diarrhea, constipation, decreased appetite, abdominal pain, pancreatitis, gastroparesis, dehydration and acute kidney injury.     Check blood sugars before meals and bedtime.   Call if blood sugars are frequently less than 70 or above 200.    Call if you need prescriptions for medications.  Carry glucose tablets or snacks with you at all times.     Follow-up in 4-5 months.

## 2024-05-29 NOTE — ASSESSMENT & PLAN NOTE
Last A1c had improved to 5.9.   Continue her current diabetes regimen.    Reports some dietary noncompliance recently.  She had lost some weight but has gained it back.  Does not want to increase her Ozempic dose at this time and wants to work better on her diet and lifestyle modification.  Follow-up with the diabetes educator.    Discussed goal A1c of 6.5-7%  Call if blood sugars are persistently less than  70 or greater than 200.     Discussed importance of diet and lifestyle modifications for diabetes management  Hypoglycemia management discussed. Carry glucose tablets or snacks at all times.     Complications:  Follow up for regular diabetes eye exam  Daily self examination of feet  Microalbumin: Monitor.     Lab Results   Component Value Date    HGBA1C 5.9 (H) 04/05/2024        LMP 20  EDC 21 @ 10 w 2 days   Pt reports that she knows when she conceived and it would make her closure to 8 weeks    Sonogram today confirms TIUP - unexpected measuring about 8 weeks each consistent with day of known conception       EDC by sonogram 3/9      Pregnancy complicated:      1   previous C section - will need to discuss the mode of delivery prior to finding out twins wanted a  now considering a c section and tubal ligation   2  Family history of ovarian and breast cancer genetically screened will do post pregnancy   3  TWIN - will get to [facility] to start surveillance  4  Obesity went over the ideal weight gain in twin gestation and to start excising 3 x week  5  Breastfeeding - still minimal of 3 x day at 2 5 years  No association with BF and baby pregnancy outcomes   There could be a decrease in milk supply     6  Previous large baby - early GTT     Sonogram today   2 GS ?  Placenta  A CRL - 2 17cm    B 1 70 CRL

## 2024-05-29 NOTE — ASSESSMENT & PLAN NOTE
Patient denies taking any statins and does not want start it.   Discussed increased risk of ASCVD with diabetes.  Monitor lipids.

## 2024-06-14 ENCOUNTER — PATIENT OUTREACH (OUTPATIENT)
Dept: ADMINISTRATIVE | Facility: HOSPITAL | Age: 64
End: 2024-06-14
Payer: MEDICARE

## 2024-06-14 DIAGNOSIS — Z13.6 ENCOUNTER FOR LIPID SCREENING FOR CARDIOVASCULAR DISEASE: Primary | ICD-10-CM

## 2024-06-14 DIAGNOSIS — Z12.31 ENCOUNTER FOR SCREENING MAMMOGRAM FOR BREAST CANCER: ICD-10-CM

## 2024-06-14 DIAGNOSIS — Z13.220 ENCOUNTER FOR LIPID SCREENING FOR CARDIOVASCULAR DISEASE: Primary | ICD-10-CM

## 2024-06-14 NOTE — PROGRESS NOTES
Patient due for the following   Health Maintenance Due   Topic    Aspirin/Antiplatelet Therapy     High Dose Statin     RSV Vaccine (Age 60+ and Pregnant patients) (1 - 1-dose 60+ series)    Foot Exam     Mammogram     Eye Exam       Immunizations: reviewed and updated  Care Everywhere: triggered  Care Teams: up to date  Outreach: none needed      Mammogram ordered.  Eye exam done externally-Dr. Tang. Scheduled.

## 2024-06-17 ENCOUNTER — CLINICAL SUPPORT (OUTPATIENT)
Dept: DIABETES | Facility: CLINIC | Age: 64
End: 2024-06-17
Payer: MEDICARE

## 2024-06-17 DIAGNOSIS — E11.65 TYPE 2 DIABETES MELLITUS WITH HYPERGLYCEMIA, WITHOUT LONG-TERM CURRENT USE OF INSULIN: ICD-10-CM

## 2024-06-17 PROCEDURE — G0108 DIAB MANAGE TRN  PER INDIV: HCPCS | Mod: S$GLB,,, | Performed by: DIETITIAN, REGISTERED

## 2024-06-17 NOTE — PROGRESS NOTES
Diabetes Care Specialist Progress Note  Author: Alisa Petty RD, CDE  Date: 6/17/2024    Program Intake  Reason for Diabetes Program Visit:: Post Program Follow-Up  Type of Follow-Up: Other (8 months)  Current diabetes risk level:: low  Continuous Glucose Monitoring  Patient has CGM: No (Would like to try a Dexcom sample to see if she can  any patterns.)    Lab Results   Component Value Date    HGBA1C 5.9 (H) 04/05/2024     Patient returns for DE follow up. A1c down from 12.4 to 5.9. On Ozmepic 1mg, but does not feel it does any good - does not control appetite and has gained weight. She would like to try the 2mg dose and if that does not work, then she would like to try Mounjaro. Tries to control intake of CHO. She is active - swims a lot, recently joined a gym. She monitors BG once a day. She would like to wear a CGM to see how foods impact her BG. Provided sample Dexcom today. She will need to remove next Monday for CT scan. She is not interested in long term use of CGM at this time. Linked to clarity - UN: [phone]; PW: [id number]. Reviewed CHO counting. No other questions or concerns at this time.    Assessment Summary and Plan    Based on today's diabetes care assessment, the following areas of need were identified:          10/2/2023    12:02 AM   Social   Support No   Access to Mass Media/Tech No   Cognitive/Behavioral Health No   Culture/Yarsani No   Communication No   Health Literacy No            10/2/2023    12:02 AM   Clinical   Medication Adherence No   Nutritional Status No            10/2/2023    12:02 AM   Diabetes Self-Management Skills   Diabetes Disease Process/Treatment Options Yes   Nutrition/Healthy Eating Yes   Physical Activity/Exercise No   Medication No   Home Blood Glucose Monitoring No   Acute Complications Yes   Chronic Complications No   Psychosocial/Coping No          Today's interventions were provided through individual discussion, instruction, and written materials were  provided.      Patient verbalized understanding of instruction and written materials.  Pt was able to return back demonstration of instructions today. Patient understood key points, needs reinforcement and further instruction.     Diabetes Self-Management Care Plan:    Today's Diabetes Self-Management Care Plan was developed with Linn's input. Linn has agreed to work toward the following goal(s) to improve his/her overall diabetes control.      Care Plan: Diabetes Management   Updates made since 5/18/2024 12:00 AM        Problem: Medications         Long-Range Goal: Patient Agrees to take Diabetes Medication(s) as prescribed.    Start Date: 10/2/2023   Expected End Date: 4/2/2024   This Visit's Progress: Met   Priority: High   Barriers: No Barriers Identified          Follow Up Plan     Follow up in about 1 year (around 6/17/2025).    Today's care plan and follow up schedule was discussed with patient.  Linn verbalized understanding of the care plan, goals, and agrees to follow up plan.        The patient was encouraged to communicate with his/her health care provider/physician and care team regarding his/her condition(s) and treatment.  I provided the patient with my contact information today and encouraged to contact me via phone or [facility]'s Patient Portal as needed.     Length of Visit   Total Time: 60 Minutes

## 2024-06-20 DIAGNOSIS — E11.9 TYPE 2 DIABETES MELLITUS WITHOUT COMPLICATION, WITHOUT LONG-TERM CURRENT USE OF INSULIN: ICD-10-CM

## 2024-06-20 RX ORDER — METFORMIN HYDROCHLORIDE 500 MG/1
TABLET, EXTENDED RELEASE ORAL
Qty: 180 TABLET | Refills: 1 | Status: SHIPPED | OUTPATIENT
Start: 2024-06-20

## 2024-06-20 NOTE — TELEPHONE ENCOUNTER
Refill Decision Note   Linn Price  is requesting a refill authorization.  Brief Assessment and Rationale for Refill:  Approve     Medication Therapy Plan:         Comments:     Note composed:10:28 AM 06/20/2024             Appointments     Last Visit   4/9/2024 Erika Mcclure MD   Next Visit   10/15/2024 Erika Mcclure MD

## 2024-06-20 NOTE — TELEPHONE ENCOUNTER
No care due was identified.  [facility] Embedded Care Due Messages. Reference number: [id number].   6/20/2024 3:40:41 AM CDT

## 2024-06-24 ENCOUNTER — HOSPITAL ENCOUNTER (OUTPATIENT)
Dept: RADIOLOGY | Facility: HOSPITAL | Age: 64
Discharge: HOME | End: 2024-06-24
Attending: INTERNAL MEDICINE
Payer: MEDICARE

## 2024-06-24 DIAGNOSIS — F17.201 NICOTINE DEPENDENCE IN REMISSION, UNSPECIFIED NICOTINE PRODUCT TYPE: ICD-10-CM

## 2024-06-24 PROCEDURE — 71271 CT THORAX LUNG CANCER SCR C-: CPT | Mod: 26,,, | Performed by: RADIOLOGY

## 2024-06-24 PROCEDURE — 71271 CT THORAX LUNG CANCER SCR C-: CPT | Mod: TC

## 2024-06-25 NOTE — PROGRESS NOTES
I sent pt a my chart message -  I reviewed your CT chest - showed stable pulmonary nodules. Degenerative changes (arthritis) in the spine. A small pericardial effusion is again noted. Suspected mild coronary calcification We will repeat your CT chest in 1 yr  Dr. YEAGER

## 2024-07-03 ENCOUNTER — PATIENT MESSAGE (OUTPATIENT)
Dept: ENDOCRINOLOGY | Facility: CLINIC | Age: 64
End: 2024-07-03
Payer: MEDICARE

## 2024-07-04 DIAGNOSIS — E11.65 TYPE 2 DIABETES MELLITUS WITH HYPERGLYCEMIA, WITHOUT LONG-TERM CURRENT USE OF INSULIN: Primary | ICD-10-CM

## 2024-07-11 ENCOUNTER — HOSPITAL ENCOUNTER (OUTPATIENT)
Dept: RADIOLOGY | Facility: HOSPITAL | Age: 64
Discharge: HOME | End: 2024-07-11
Attending: INTERNAL MEDICINE
Payer: MEDICARE

## 2024-07-11 ENCOUNTER — PATIENT MESSAGE (OUTPATIENT)
Dept: ADMINISTRATIVE | Facility: OTHER | Age: 64
End: 2024-07-11
Payer: MEDICARE

## 2024-07-11 VITALS — BODY MASS INDEX: 35.99 KG/M2 | WEIGHT: 216 LBS | HEIGHT: 65 IN

## 2024-07-11 DIAGNOSIS — Z12.31 ENCOUNTER FOR SCREENING MAMMOGRAM FOR BREAST CANCER: ICD-10-CM

## 2024-07-11 PROCEDURE — 77063 BREAST TOMOSYNTHESIS BI: CPT | Mod: TC

## 2024-07-11 PROCEDURE — 77067 SCR MAMMO BI INCL CAD: CPT | Mod: TC

## 2024-07-11 PROCEDURE — 77063 BREAST TOMOSYNTHESIS BI: CPT | Mod: 26,,, | Performed by: RADIOLOGY

## 2024-07-11 PROCEDURE — 77067 SCR MAMMO BI INCL CAD: CPT | Mod: 26,,, | Performed by: RADIOLOGY

## 2024-07-11 NOTE — PROGRESS NOTES
I sent pt a my chart message -  I reviewed your Mammogram -   There are scattered areas of fibroglandular density. There is no evidence of suspicious masses, microcalcifications or architectural distortion.  Impression:   No mammographic evidence of malignancy.  Routine screening mammogram in 1 year is recommended.  Dr. YEAGER

## 2024-07-26 ENCOUNTER — PATIENT MESSAGE (OUTPATIENT)
Dept: PRIMARY CARE CLINIC | Facility: CLINIC | Age: 64
End: 2024-07-26
Payer: MEDICARE

## 2024-07-26 DIAGNOSIS — G47.33 OSA ON CPAP: Primary | ICD-10-CM

## 2024-07-29 ENCOUNTER — TELEPHONE (OUTPATIENT)
Dept: CARDIOLOGY | Facility: CLINIC | Age: 64
End: 2024-07-29
Payer: MEDICARE

## 2024-08-08 ENCOUNTER — OFFICE VISIT (OUTPATIENT)
Dept: CARDIOLOGY | Facility: CLINIC | Age: 64
End: 2024-08-08
Payer: MEDICARE

## 2024-08-08 VITALS
SYSTOLIC BLOOD PRESSURE: 135 MMHG | HEART RATE: 75 BPM | DIASTOLIC BLOOD PRESSURE: 76 MMHG | BODY MASS INDEX: 35.33 KG/M2 | WEIGHT: 212.31 LBS

## 2024-08-08 DIAGNOSIS — E11.69 HYPERLIPIDEMIA ASSOCIATED WITH TYPE 2 DIABETES MELLITUS: ICD-10-CM

## 2024-08-08 DIAGNOSIS — R06.02 SOB (SHORTNESS OF BREATH): Primary | ICD-10-CM

## 2024-08-08 DIAGNOSIS — E11.59 HYPERTENSION ASSOCIATED WITH DIABETES: ICD-10-CM

## 2024-08-08 DIAGNOSIS — I70.0 AORTIC ATHEROSCLEROSIS: ICD-10-CM

## 2024-08-08 DIAGNOSIS — I15.2 HYPERTENSION ASSOCIATED WITH DIABETES: ICD-10-CM

## 2024-08-08 DIAGNOSIS — E78.5 HYPERLIPIDEMIA ASSOCIATED WITH TYPE 2 DIABETES MELLITUS: ICD-10-CM

## 2024-08-08 DIAGNOSIS — E66.01 CLASS 2 SEVERE OBESITY DUE TO EXCESS CALORIES WITH SERIOUS COMORBIDITY AND BODY MASS INDEX (BMI) OF 35.0 TO 35.9 IN ADULT: ICD-10-CM

## 2024-08-08 DIAGNOSIS — I87.2 VENOUS INSUFFICIENCY: ICD-10-CM

## 2024-08-08 PROCEDURE — 99999 PR PBB SHADOW E&M-EST. PATIENT-LVL IV: CPT | Mod: PBBFAC,,, | Performed by: INTERNAL MEDICINE

## 2024-08-09 LAB
OHS QRS DURATION: 92 MS
OHS QTC CALCULATION: 440 MS

## 2024-08-29 ENCOUNTER — LAB VISIT (OUTPATIENT)
Dept: LAB | Facility: HOSPITAL | Age: 64
End: 2024-08-29
Attending: INTERNAL MEDICINE
Payer: MEDICARE

## 2024-08-29 DIAGNOSIS — E11.65 TYPE 2 DIABETES MELLITUS WITH HYPERGLYCEMIA, WITHOUT LONG-TERM CURRENT USE OF INSULIN: ICD-10-CM

## 2024-08-29 LAB
ESTIMATED AVG GLUCOSE: 123 MG/DL (ref 68–131)
HBA1C MFR BLD: 5.9 % (ref 4–5.6)

## 2024-08-29 PROCEDURE — 36415 COLL VENOUS BLD VENIPUNCTURE: CPT | Performed by: INTERNAL MEDICINE

## 2024-08-29 PROCEDURE — 83036 HEMOGLOBIN GLYCOSYLATED A1C: CPT | Performed by: INTERNAL MEDICINE

## 2024-09-26 ENCOUNTER — HOSPITAL ENCOUNTER (OUTPATIENT)
Dept: CARDIOLOGY | Facility: HOSPITAL | Age: 64
Discharge: HOME | End: 2024-09-26
Attending: INTERNAL MEDICINE
Payer: MEDICARE

## 2024-09-26 DIAGNOSIS — R06.02 SOB (SHORTNESS OF BREATH): ICD-10-CM

## 2024-09-26 DIAGNOSIS — I87.2 VENOUS INSUFFICIENCY: ICD-10-CM

## 2024-09-26 LAB
LEFT GIAC DIA: 0.23 MM
LEFT GREAT SAPHENOUS DISTAL THIGH DIA: 0.41 CM
LEFT GREAT SAPHENOUS JUNCTION DIA: 0.65 CM
LEFT GREAT SAPHENOUS KNEE DIA: 0.46 CM
LEFT GREAT SAPHENOUS KNEE REFLUX: 1387 MS
LEFT GREAT SAPHENOUS MIDDLE THIGH DIA: 0.33 CM
LEFT GREAT SAPHENOUS PROXIMAL CALF DIA: 0.16 CM
LEFT SMALL SAPHENOUS KNEE DIA: 0.23 CM
LEFT SMALL SAPHENOUS SPJ DIA: 0.21 CM
RIGHT GREAT SAPHENOUS DISTAL THIGH DIA: 0.38 CM
RIGHT GREAT SAPHENOUS JUNCTION DIA: 0.63 CM
RIGHT GREAT SAPHENOUS KNEE DIA: 0.31 CM
RIGHT GREAT SAPHENOUS MIDDLE THIGH DIA: 0.28 CM
RIGHT GREAT SAPHENOUS PROXIMAL CALF DIA: 0.25 CM
RIGHT SMALL SAPHENOUS KNEE DIA: 0.45 CM
RIGHT SMALL SAPHENOUS KNEE REFLUX: 1398 MS
RIGHT SMALL SAPHENOUS SPJ DIA: 0.32 CM
RIGHT SMALL SAPHENOUS SPJ REFLUX: 4732 MS

## 2024-09-26 PROCEDURE — 93970 EXTREMITY STUDY: CPT | Mod: TC,PO

## 2024-10-11 DIAGNOSIS — E11.65 TYPE 2 DIABETES MELLITUS WITH HYPERGLYCEMIA, WITHOUT LONG-TERM CURRENT USE OF INSULIN: ICD-10-CM

## 2024-10-13 NOTE — PROGRESS NOTES
[facility] Note    Chief Complaint      Chief Complaint   Patient presents with    Follow-up       History of Present Illness      Linn Price is a 64 y.o. WF with HTN, DM - II, JANAE on CPAP, Chronic Constipation, GERD, Colon polyps,Pulm nodule, Noncompliance, and Nicotine Dependence in Remission presents to fu well visit and chronic issues. Last virtual visit - 4/9/24    Ecchymosis to Rt eye - She sustained injuries to her eye and ankle 2 days ago while cleaning and stacking chairs. The eye was initially painful and swollen, treated with ice for 2 days, with pain more pronounced in the brow area. The ankle was injured when the chair struck her leg during the incident. The ankle is much better.     Venous insuff - venous us - 9/26/24 - no DVT. The left greater saphenous vein has reflux.        Chronic Urticaria/Angioedema - UC - 8/16/23 - Allergic Rx/lip swelling - Rx Benadryl and allegra. Fu by A/I, Dr. Cooper. Pt on Zyrtec 1tabs BID. She just had allergy testing.  She was started on Lisinopril in June 2023.  Sx's preceded this medication. No trouble breathign or swallowing. +anti TPO - 32 and + antithyroglobulin - 33.  Is she a candidate for Xolair? We stopped Lisinopril due to chronic Hives.  Digital BP program started amlodipine 5 mg 12/20/23.  She takes antihistamine BID.      H/o Noncompliance -   Pt reports she is no longer missing her meds.     Pulm nodules -  CT chest -6/24/24 -   showed stable pulmonary nodules. Degenerative changes (arthritis) in the spine. A small pericardial effusion is again noted. Suspected mild coronary calcification We will repeat your CT chest in 1 yr      Thyroid nodule - Thyroid u/s - 10/16/23 - Stable subcentimeter (9mm and 6 mm) Multinodular goiter which does not meet criteria for fine-needle aspiration.  +anti TPO - 32 and + antithyroglobulin - 33. Pt euthyroid.      COPD - Pt with prev PFT's at  - + Obstruction. Cont prn albuterol - has not needed.   +SOB with house work.       Nicotine Dependence -in remission - Pt quit 1/12/20. Congratulated on cessation.       HTN - She saw [facility], Dr. Holloway. She is fu by digital BP monitoring program.   Cont to monitor.   Pt fu A/I carnes for recent food allergies with lip swelling first. Pt would like a fluid pill to help with intermittent edema. She will monitor BP closely and alert me for any concerns.  Recently stopped amlodipine 10mg/d due to leg cramps which improved off it.   BP readings elev this wk. Pt prev. on HCTZ 12.5 mg daily prn edema - will resume 12.5 mg/d and resume Amlodipine at low dose 5 mg/d. Saw Dr. Holloway and was started on  crestor (she did not start Crestor). She reports being told she is a candidate for ablation.  Echo - 6/23/23 - mild concentric hypertrophy and normal systolic function. EF 65%; Grade I Diastolic dysf. Mild aortic regurg. Exercise Stress Test - 6/26/23  - neg. Pt on Amlodipine 10 mg/d.       Left renal cyst - 1.8 cm simple parapelvic cyst. Lower pole Left kidney. Reassured.      DM - II - Ha1c is controlled at 5.9 - 4/5/24.  Cont Metformin  mg 2 tabs po QHS and Ozempic 1 mg/wk. She had not bumped Metformin to 3 tabs. No evidence of microalbuminuria.  She reports some constipation.  Rec inc hydration and try stool softeners or Miralax prn. Has a BM Q3-4 days.  Has fu with [facility], Dr. Coy, in Dec.  No microalbuminuria - 4/5/24. Pt now on Mounjaro 10 mg/wk     JANAE on CPAP - She uses APAP it nightly x 6-7 hrs. Rec low carb diet and exercise for wt loss. Doing well. +fatigue.      Chronic constipation - Doing well at present.   Rec adeq hydration and Miralax prn.       Colon polyps - Fu by Dr. Swain/Magen.  Repeat 9/2026      Hemorrhoids - Fu by Dr. Tracy.  Cont conservative Mgmnt.      GERD - Rare. Rec reflux prec. Can take Pepcid prn-rarely needs it.      Vit D def - 30 up from 14- Pt completed Ergocalciferol once weekly. Pt currently on Vit D3 2000 u/d.       Dishydrotic Eczema- Dx  via Bx. Prev fu by Derm - Dr. Sky. Doing better.  She is on light therapy. She can walk and use shoes now.  She is also on topicals.      Chronic Otitis Externa - Fu by ENT, Dr. David.  Pt on Acetic acid drops prn.      Obesity - BMI - 33.97 down 2 lb since last visit. Rec low carb diet and exercising.  Plans to resume exercise     HLD - Pt has not started her Crestor that cards started. She declines. I asked her to reconsider. Atherosclerosis including coronary arteries.  She is on Aspirin 81mg. She declines statin for now.  Her cholesterol was not quite controlled Her goal LDL is < 70. Cont to monitor.     HDL 48 LDL 98 - 4/5/24     Normocytic anemia - H/H - 13/41 - cont to monitor.      Neck pain x 6-8 mos-   C/o stiffness. No numbness/tingling/weakness. + DDD. Fu by [facility]. MRI C spine - 2/16/24- mild spinal stenosis. Ptx was no help.      Lab review:   5/22/20 - WBC - 7.6;  H/H - 12.1/36.3; Plt - 338;  BUN/Cr - 12/0.7; LFt's - wnl;   TG 80; Ha1c - 6.9; TSH - 1.52;  Vit D -14      HCM - Flu - none - refuses;  Tdap -2/1/22;  PCV 13 - none - declines;  PVX 23 - none - declines;  Shingrix - none - declines;  COVID -19 vaccine #1 - declines; MGM - 7/11/24 -repeat 1 yr;   DEXA - 8/15/23 - wnl;  PAP -7/6/21 - neg; Hep C Screen - 6/20/22 - neg;  HIV Screen - none - defers; C-scope - 8/30/23 -polyps and hemorrhoids -  repeat 3 yrs;  Prev PCP - me at [facility] and then Dr. Armenta; Prev OB/GYN - Dr. Catalan;  GI - Dr. Swain; A/I - Dr. Cooper;  ENT- Dr. David; Derm - Dr. Downey; Ophtho - Dr. Shin; Rheum - Dr. Campoverde; Cards - Dr. Holloway; well visit - 1/21/22    Patient Care Team:  Erika Mcclure MD as PCP - General (Internal Medicine)  Brent Wilson MD as Obstetrician (Obstetrics)  Santana Swain MD as Consulting Physician (Gastroenterology)  Daniel Bynum MD as Consulting Physician (General Surgery)  Eugene Fontana Jr., MD as Consulting Physician (Otolaryngology)  Clyde  Chong TAPIA MD as Consulting Physician (Ophthalmology)  Melissa Austin MA (Inactive) as Care Coordinator  Julianne Narvaez as Digital Medicine Health   Charlotte Diaz PharmD as Hypertension Digital Medicine Clinician  Erika Mcclure MD as Hypertension Digital Medicine Responsible Provider (Internal Medicine)  Charlotte Diaz PharmD as Diabetes Digital Medicine Clinician  Erika Mcclure MD as Diabetes Digital Medicine Responsible Provider (Internal Medicine)     Health Maintenance:  Immunization History   Administered Date(s) Administered    Tetanus 02/01/2022      Health Maintenance   Topic Date Due    Aspirin/Antiplatelet Therapy  Never done    High Dose Statin  Never done    Foot Exam  05/16/2024    Eye Exam  07/19/2024    Hemoglobin A1c  02/28/2025    Lipid Panel  04/05/2025    LDCT Lung Screen  06/24/2025    Mammogram  07/11/2025    DEXA Scan  08/15/2025    Colorectal Cancer Screening  09/05/2026    TETANUS VACCINE  02/01/2032    Hepatitis C Screening  Completed    Shingles Vaccine  Discontinued        Past Medical History:  Past Medical History:   Diagnosis Date    Allergy     Benign hypertension 08/19/2014    Broken fingers     Broken toe     Colon polyps 08/19/2014    Diabetes mellitus     Eczema     Elevated fasting glucose 08/19/2014    Family history of bladder cancer     Family history of heart disease 08/19/2014    History of broken nose     Menopause 2009    JANAE on CPAP 11/15/2007    Per PSG: Rx'd CPAP, pressure 13, using small Comfort Select mask or interface of patient's choice, chin strap, and heated humidifier      Pulmonary nodule 12/01/2020    Pyloric stenosis     as a child    Reflux esophagitis 08/19/2014    Per EGD 8/9/2006      Tobacco abuse     Ulcer     at 14 y.o.    Urticaria        Past Surgical History:   has a past surgical history that includes Gastric restriction surgery; Sarah tooth extraction; Upper gastrointestinal endoscopy (2006); Colonoscopy (2018); and  Tonsillectomy.    Family History:  family history includes Bladder Cancer in her maternal uncle; COPD in her sister; Dementia in her mother; Diabetes in her father and sister; Hypertension in her brother, mother, and sister; Kidney disease in her mother; Macular degeneration in her mother; Prostate cancer in her father; Psoriasis in her mother; Stroke (age of onset: 60) in her mother; Stroke (age of onset: 75) in her father.     Social History:  Social History     Tobacco Use    Smoking status: Former     Current packs/day: 0.00     Average packs/day: 1.5 packs/day for 46.8 years (70.2 ttl pk-yrs)     Types: Cigarettes     Start date: 3/17/1973     Quit date: 2020     Years since quittin.7     Passive exposure: Past    Smokeless tobacco: Never   Substance Use Topics    Alcohol use: Yes     Comment: social - rare    Drug use: Never       Review of Systems   Constitutional:  Negative for chills, fever and night sweats.   HENT:  Negative for nasal congestion.    Eyes:  Negative for pain and visual disturbance.   Respiratory:  Negative for cough and shortness of breath.    Cardiovascular:  Negative for chest pain and palpitations.   Gastrointestinal:  Positive for constipation. Negative for abdominal pain, blood in stool, diarrhea, nausea, vomiting and reflux.        The patient reports occasional constipation, potentially a side effect of Mounjaro, managed by yogurt consumption.    Endocrine: Negative for cold intolerance, heat intolerance and polydipsia.   Genitourinary:  Negative for bladder incontinence, dysuria and frequency.   Musculoskeletal:  Positive for arthralgias and back pain. Negative for leg pain.        Stiffness in AM. Last had leg cramps 2 mos ago.    Neurological:  Negative for dizziness and headaches.   Psychiatric/Behavioral:  Negative for depressed mood. The patient is not nervous/anxious.         Medications:    Current Outpatient Medications:     acetic acid (VOSOL) 2 % otic solution,  Place into both ears., Disp: , Rfl:     ascorbic acid, vitamin C, (VITAMIN C) 100 MG tablet, Take 100 mg by mouth once daily., Disp: , Rfl:     blood sugar diagnostic (ONETOUCH ULTRA TEST) Strp, USE TO TEST BLOOD GLUCOSE ONCE DAILY, Disp: 100 strip, Rfl: 1    blood sugar diagnostic (TRUE METRIX GLUCOSE TEST STRIP) Strp, Use to test blood glucose two (2) times a day, to be used with insurance-preferred brand of glucometer/supplies., Disp: 200 strip, Rfl: 3    blood sugar diagnostic Strp, To check BG 2 times daily, to use with insurance preferred meter, Disp: 200 strip, Rfl: 3    clobetasol 0.05% (TEMOVATE) 0.05 % Oint, AAA on hands and feet BID  x 1-2 wks then prn flares only, Disp: 60 g, Rfl: 3    coenzyme Q10 (COQ-10) 100 mg capsule, Take 2 once daily, Disp: 1 capsule, Rfl: 0    diphenhydrAMINE (BENADRYL) 50 MG capsule, Take 1 capsule (50 mg total) by mouth every 6 (six) hours as needed for Itching or Allergies., Disp: 30 capsule, Rfl: 0    lancets (ONETOUCH DELICA PLUS LANCET) 30 gauge Misc, TO CHECK BLOOD GLUCOSE ONCE DAILY, Disp: 100 each, Rfl: 1    lancets Misc, To check BG 2 times daily, to use with insurance preferred meter, Disp: 200 each, Rfl: 2    metFORMIN (GLUCOPHAGE-XR) 500 MG ER 24hr tablet, TAKE 2 TABLETS BY MOUTH EVERY NIGHT AT BEDTIME, Disp: 180 tablet, Rfl: 1    multivitamin capsule, Take 1 capsule by mouth once daily., Disp: , Rfl:     ONETOUCH DELICA PLUS LANCET 30 gauge Misc, TO CHECK BLOOD GLUCOSE ONCE DAILY, Disp: , Rfl:     ONETOUCH ULTRA2 METER Misc, USE TO CHECK BLOOD GLUCOSE ONCE DAILY, Disp: , Rfl:     sulfacetamide sodium 10% (BLEPH-10) 10 % ophthalmic solution, 1 drop as needed., Disp: , Rfl:     tirzepatide (MOUNJARO) 10 mg/0.5 mL PnIj, Inject 10 mg into the skin every 7 days., Disp: 4 Pen, Rfl: 2    tiZANidine (ZANAFLEX) 4 MG tablet, Take 1 tablet (4 mg total) by mouth every 8 (eight) hours as needed (muscle spasms)., Disp: 60 tablet, Rfl: 2    triamcinolone acetonide 0.1% (KENALOG)  "0.1 % cream, Apply topically as needed., Disp: , Rfl:     vitamin D (VITAMIN D3) 1000 units Tab, Take 1,000 Units by mouth once daily., Disp: , Rfl:     albuterol (PROVENTIL/VENTOLIN HFA) 90 mcg/actuation inhaler, Inhale 2 puffs into the lungs every 6 (six) hours as needed for Wheezing. Rescue, Disp: 54 g, Rfl: 1    amLODIPine (NORVASC) 5 MG tablet, Take 1 tablet (5 mg total) by mouth once daily., Disp: 90 tablet, Rfl: 0    aspirin 81 MG Chew, Take 1 tablet (81 mg total) by mouth once daily., Disp: , Rfl: 0    calcipotriene (DOVONOX) 0.005 % cream, Apply topically 2 (two) times daily. To affected areas on hands and feet., Disp: 120 g, Rfl: 3    cetirizine (ZYRTEC) 10 MG tablet, Take 1 tablet (10 mg total) by mouth once daily., Disp: 180 tablet, Rfl: 3    EPINEPHrine (EPIPEN) 0.3 mg/0.3 mL AtIn, Inject 0.3 mLs (0.3 mg total) into the muscle once. for 1 dose, Disp: 2 each, Rfl: 0    fexofenadine (ALLEGRA) 180 MG tablet, Take 1 tablet (180 mg total) by mouth once daily., Disp: 10 tablet, Rfl: 0    hydroCHLOROthiazide (HYDRODIURIL) 12.5 MG Tab, 1 po QAM, Disp: 90 tablet, Rfl: 0     Allergies:  Review of patient's allergies indicates:   Allergen Reactions    Codeine Other (See Comments)     psychosis    Lisinopril Swelling     angioedema       Physical Exam      Vital Signs  Temp: 97.6 °F (36.4 °C)  Temp Source: Temporal  Pulse: 86  SpO2: 98 %  BP: (!) 160/80  Pain Score: 0-No pain  Height and Weight  Height: 5' 5" (165.1 cm)  Weight: 92.6 kg (204 lb 2.3 oz)  BSA (Calculated - sq m): 2.06 sq meters  BMI (Calculated): 34  Weight in (lb) to have BMI = 25: 149.9             Physical Exam  Vitals reviewed.   Constitutional:       Appearance: Normal appearance.   HENT:      Head: Normocephalic and atraumatic.      Ears:      Comments: Thick debris huy ear canals     Mouth/Throat:      Mouth: Mucous membranes are moist.   Eyes:      Comments: Huy cataracts; Ecchymosis to RU lid   Neck:      Vascular: No carotid bruit. "   Cardiovascular:      Rate and Rhythm: Normal rate and regular rhythm.      Pulses: Normal pulses.      Heart sounds: Normal heart sounds.   Pulmonary:      Effort: No respiratory distress.      Breath sounds: Normal breath sounds. No wheezing.   Abdominal:      General: Bowel sounds are normal. There is no distension.      Palpations: Abdomen is soft.      Tenderness: There is no abdominal tenderness. There is no guarding or rebound.   Skin:     General: Skin is warm.   Neurological:      General: No focal deficit present.      Mental Status: She is alert and oriented to person, place, and time.   Psychiatric:         Mood and Affect: Mood normal.         Behavior: Behavior normal.          Laboratory:  CBC:  Recent Labs   Lab 06/20/22  1528 06/14/23  0900   WBC 8.1 7.93   RBC 4.12 L 4.86   Hemoglobin 11.3 L 13.1   Hematocrit 34.4 L 41.7   Platelets 349 333   MCV 83.6 86   MCH 27.6 27.0   MCHC 33.0 31.4 L       CMP:  Recent Labs   Lab 06/14/23  0900 07/05/23  1311 10/06/23  0918 04/05/24  0909   Glucose 337 H   < > 121 H 112 H   Calcium 9.3   < > 9.5 9.7   Albumin 3.7  --  3.8 3.7   Total Protein 7.4  --  7.8 7.7   Sodium 135 L   < > 140 139   Potassium 4.2   < > 4.2 4.4   CO2 22 L   < > 28 26   Chloride 100   < > 104 104   BUN 16   < > 15 16   Creatinine 0.9   < > 0.8 0.8   Alkaline Phosphatase 91  --  96 104   ALT 23  --  13 23   AST 16  --  16 19   Total Bilirubin 0.5  --  0.4 0.4    < > = values in this interval not displayed.          LIPIDS:  Recent Labs   Lab 06/20/22  1528 06/14/23  0900 04/05/24  0909   TSH 1.40 0.965  --    HDL 56 60 48   Cholesterol 179 198 167   Triglycerides 86 101 105   LDL Cholesterol  --  117.8 98.0   LDL Calculated 109  --   --    HDL/Cholesterol Ratio  --  30.3 28.7   Non-HDL Cholesterol  --  138 119   Total Cholesterol/HDL Ratio  --  3.3 3.5       TSH:  Recent Labs   Lab 06/20/22  1528 06/14/23  0900   TSH 1.40 0.965       A1C:  Recent Labs   Lab 06/20/22  1528 06/14/23  0997  10/06/23  0918 04/05/24  0909 08/29/24  0814   Hemoglobin A1C 7.1 H 12.4 H 6.6 H 5.9 H 5.9 H       Urine Microalbumin/Cr:  Recent Labs   Lab 06/22/22  1614 06/14/23  0903 04/05/24  0956   Microalb/Creat Ratio 18 20.4 19.7           Recent Labs   Lab 06/20/22  1528   Hepatitis C Ab NON-REACTIVE       Assessment/Plan     Linn Price is a 64 y.o.female with:    Type 2 diabetes mellitus without complication, without long-term current use of insulin  - Controlled.  Cont current.     Hypertension associated with diabetes  -     hydroCHLOROthiazide (HYDRODIURIL) 12.5 MG Tab; 1 po QAM  Dispense: 90 tablet; Refill: 0  -     Basic Metabolic Panel; Future; Expected date: 10/29/2024  -     Magnesium; Future; Expected date: 10/29/2024  -Started hydrochlorothiazide (HCTZ) at low dose, to be taken in the morning. - Restarted amlodipine 5mg daily.  Explained potential side effects of HCTZ, including increased urination and possible leg cramps.  Discussed importance of staying hydrated while on HCTZ. - Linn to take HCTZ in the morning to avoid nighttime urination.   Follow up in 2 weeks for nurse blood pressure check and lab work.     Hyperlipidemia, unspecified hyperlipidemia type  -stable. Pt declines statin.     Chronic obstructive pulmonary disease, unspecified COPD type  -     albuterol (PROVENTIL/VENTOLIN HFA) 90 mcg/actuation inhaler; Inhale 2 puffs into the lungs every 6 (six) hours as needed for Wheezing. Rescue  Dispense: 54 g; Refill: 1    JANAE on CPAP  - Stable.  Cont current regimen.    Leg cramps  - Informed patient about magnesium's potential benefits for leg cramps and its possible laxative effect. Recommend considering OTC magnesium oxide 400mg daily for leg cramps.     Chronic urticaria  - Stable.  Cont current regimen.    Multiple pulmonary nodules  -Repeat CT chest as above.     Screening mammogram, encounter for  -     Mammo Digital Screening Bilat w/ Pranay; Future; Expected date: 07/12/2025    Encounter  for Papanicolaou smear for cervical cancer screening  -     Ambulatory referral/consult to Obstetrics / Gynecology; Future; Expected date: 10/22/2024    Other orders  -     amLODIPine (NORVASC) 5 MG tablet; Take 1 tablet (5 mg total) by mouth once daily.  Dispense: 90 tablet; Refill: 0  -     cetirizine (ZYRTEC) 10 MG tablet; Take 1 tablet (10 mg total) by mouth once daily.  Dispense: 180 tablet; Refill: 3         Chronic conditions status updated as per HPI.  Other than changes above, cont current medications and maintain follow up with specialists.   Follow up in about 6 months (around 4/15/2025) for fu chronic issues or sooner if needed.      Erika Mcclure MD  [facility]

## 2024-10-14 RX ORDER — TIRZEPATIDE 10 MG/.5ML
10 INJECTION, SOLUTION SUBCUTANEOUS
Qty: 4 PEN | Refills: 2 | Status: SHIPPED | OUTPATIENT
Start: 2024-10-14

## 2024-10-15 ENCOUNTER — OFFICE VISIT (OUTPATIENT)
Dept: PRIMARY CARE CLINIC | Facility: CLINIC | Age: 64
End: 2024-10-15
Payer: MEDICARE

## 2024-10-15 VITALS
WEIGHT: 204.13 LBS | OXYGEN SATURATION: 98 % | BODY MASS INDEX: 34.01 KG/M2 | HEART RATE: 86 BPM | SYSTOLIC BLOOD PRESSURE: 160 MMHG | HEIGHT: 65 IN | TEMPERATURE: 98 F | DIASTOLIC BLOOD PRESSURE: 80 MMHG

## 2024-10-15 DIAGNOSIS — G47.33 OSA ON CPAP: ICD-10-CM

## 2024-10-15 DIAGNOSIS — Z12.4 ENCOUNTER FOR PAPANICOLAOU SMEAR FOR CERVICAL CANCER SCREENING: ICD-10-CM

## 2024-10-15 DIAGNOSIS — E11.59 HYPERTENSION ASSOCIATED WITH DIABETES: ICD-10-CM

## 2024-10-15 DIAGNOSIS — I15.2 HYPERTENSION ASSOCIATED WITH DIABETES: ICD-10-CM

## 2024-10-15 DIAGNOSIS — E78.5 HYPERLIPIDEMIA, UNSPECIFIED HYPERLIPIDEMIA TYPE: ICD-10-CM

## 2024-10-15 DIAGNOSIS — R25.2 LEG CRAMPS: ICD-10-CM

## 2024-10-15 DIAGNOSIS — Z12.31 SCREENING MAMMOGRAM, ENCOUNTER FOR: ICD-10-CM

## 2024-10-15 DIAGNOSIS — L50.8 CHRONIC URTICARIA: ICD-10-CM

## 2024-10-15 DIAGNOSIS — J44.9 CHRONIC OBSTRUCTIVE PULMONARY DISEASE, UNSPECIFIED COPD TYPE: ICD-10-CM

## 2024-10-15 DIAGNOSIS — R91.8 MULTIPLE PULMONARY NODULES: ICD-10-CM

## 2024-10-15 DIAGNOSIS — E11.9 TYPE 2 DIABETES MELLITUS WITHOUT COMPLICATION, WITHOUT LONG-TERM CURRENT USE OF INSULIN: Primary | ICD-10-CM

## 2024-10-15 PROCEDURE — 3079F DIAST BP 80-89 MM HG: CPT | Mod: CPTII,S$GLB,, | Performed by: INTERNAL MEDICINE

## 2024-10-15 PROCEDURE — 3066F NEPHROPATHY DOC TX: CPT | Mod: CPTII,S$GLB,, | Performed by: INTERNAL MEDICINE

## 2024-10-15 PROCEDURE — 1126F AMNT PAIN NOTED NONE PRSNT: CPT | Mod: CPTII,S$GLB,, | Performed by: INTERNAL MEDICINE

## 2024-10-15 PROCEDURE — 1160F RVW MEDS BY RX/DR IN RCRD: CPT | Mod: CPTII,S$GLB,, | Performed by: INTERNAL MEDICINE

## 2024-10-15 PROCEDURE — 3008F BODY MASS INDEX DOCD: CPT | Mod: CPTII,S$GLB,, | Performed by: INTERNAL MEDICINE

## 2024-10-15 PROCEDURE — 3061F NEG MICROALBUMINURIA REV: CPT | Mod: CPTII,S$GLB,, | Performed by: INTERNAL MEDICINE

## 2024-10-15 PROCEDURE — G2211 COMPLEX E/M VISIT ADD ON: HCPCS | Mod: S$GLB,,, | Performed by: INTERNAL MEDICINE

## 2024-10-15 PROCEDURE — 99999 PR PBB SHADOW E&M-EST. PATIENT-LVL V: CPT | Mod: PBBFAC,,, | Performed by: INTERNAL MEDICINE

## 2024-10-15 PROCEDURE — 3077F SYST BP >= 140 MM HG: CPT | Mod: CPTII,S$GLB,, | Performed by: INTERNAL MEDICINE

## 2024-10-15 PROCEDURE — 3044F HG A1C LEVEL LT 7.0%: CPT | Mod: CPTII,S$GLB,, | Performed by: INTERNAL MEDICINE

## 2024-10-15 PROCEDURE — 1159F MED LIST DOCD IN RCRD: CPT | Mod: CPTII,S$GLB,, | Performed by: INTERNAL MEDICINE

## 2024-10-15 PROCEDURE — 99214 OFFICE O/P EST MOD 30 MIN: CPT | Mod: S$GLB,,, | Performed by: INTERNAL MEDICINE

## 2024-10-15 RX ORDER — AMLODIPINE BESYLATE 5 MG/1
5 TABLET ORAL DAILY
Qty: 90 TABLET | Refills: 0 | Status: SHIPPED | OUTPATIENT
Start: 2024-10-15 | End: 2025-10-15

## 2024-10-15 RX ORDER — CETIRIZINE HYDROCHLORIDE 10 MG/1
10 TABLET ORAL DAILY
Qty: 180 TABLET | Refills: 3 | Status: SHIPPED | OUTPATIENT
Start: 2024-10-15

## 2024-10-15 RX ORDER — HYDROCHLOROTHIAZIDE 12.5 MG/1
TABLET ORAL
Qty: 90 TABLET | Refills: 0 | Status: SHIPPED | OUTPATIENT
Start: 2024-10-15

## 2024-10-15 RX ORDER — ALBUTEROL SULFATE 90 UG/1
2 INHALANT RESPIRATORY (INHALATION) EVERY 6 HOURS PRN
Qty: 54 G | Refills: 1 | Status: SHIPPED | OUTPATIENT
Start: 2024-10-15

## 2024-10-29 ENCOUNTER — PATIENT MESSAGE (OUTPATIENT)
Dept: PRIMARY CARE CLINIC | Facility: CLINIC | Age: 64
End: 2024-10-29
Payer: MEDICARE

## 2024-10-29 ENCOUNTER — TELEPHONE (OUTPATIENT)
Dept: PRIMARY CARE CLINIC | Facility: CLINIC | Age: 64
End: 2024-10-29
Payer: MEDICARE

## 2024-11-08 ENCOUNTER — CLINICAL SUPPORT (OUTPATIENT)
Dept: PRIMARY CARE CLINIC | Facility: CLINIC | Age: 64
End: 2024-11-08
Attending: INTERNAL MEDICINE
Payer: MEDICARE

## 2024-11-08 ENCOUNTER — LAB VISIT (OUTPATIENT)
Dept: LAB | Facility: HOSPITAL | Age: 64
End: 2024-11-08
Attending: INTERNAL MEDICINE
Payer: MEDICARE

## 2024-11-08 VITALS — SYSTOLIC BLOOD PRESSURE: 138 MMHG | DIASTOLIC BLOOD PRESSURE: 82 MMHG

## 2024-11-08 DIAGNOSIS — E11.59 HYPERTENSION ASSOCIATED WITH DIABETES: ICD-10-CM

## 2024-11-08 DIAGNOSIS — I15.2 HYPERTENSION ASSOCIATED WITH DIABETES: Primary | ICD-10-CM

## 2024-11-08 DIAGNOSIS — Z12.31 SCREENING MAMMOGRAM, ENCOUNTER FOR: ICD-10-CM

## 2024-11-08 DIAGNOSIS — I15.2 HYPERTENSION ASSOCIATED WITH DIABETES: ICD-10-CM

## 2024-11-08 DIAGNOSIS — E11.59 HYPERTENSION ASSOCIATED WITH DIABETES: Primary | ICD-10-CM

## 2024-11-08 LAB
ANION GAP SERPL CALC-SCNC: 12 MMOL/L (ref 8–16)
BUN SERPL-MCNC: 15 MG/DL (ref 8–23)
CALCIUM SERPL-MCNC: 9.4 MG/DL (ref 8.7–10.5)
CHLORIDE SERPL-SCNC: 103 MMOL/L (ref 95–110)
CO2 SERPL-SCNC: 25 MMOL/L (ref 23–29)
CREAT SERPL-MCNC: 0.7 MG/DL (ref 0.5–1.4)
EST. GFR  (NO RACE VARIABLE): >60 ML/MIN/1.73 M^2
GLUCOSE SERPL-MCNC: 99 MG/DL (ref 70–110)
MAGNESIUM SERPL-MCNC: 2.1 MG/DL (ref 1.6–2.6)
POTASSIUM SERPL-SCNC: 4.7 MMOL/L (ref 3.5–5.1)
SODIUM SERPL-SCNC: 140 MMOL/L (ref 136–145)

## 2024-11-08 PROCEDURE — 36415 COLL VENOUS BLD VENIPUNCTURE: CPT | Performed by: INTERNAL MEDICINE

## 2024-11-08 PROCEDURE — 83735 ASSAY OF MAGNESIUM: CPT | Performed by: INTERNAL MEDICINE

## 2024-11-08 PROCEDURE — 80048 BASIC METABOLIC PNL TOTAL CA: CPT | Performed by: INTERNAL MEDICINE

## 2024-11-08 PROCEDURE — 99999 PR PBB SHADOW E&M-EST. PATIENT-LVL III: CPT | Mod: PBBFAC,,,

## 2024-11-08 NOTE — PROGRESS NOTES
I sent pt a my chart message -  I reviewed your labs.  Your Magnesium was normal at 2.1. Your kidney function looked good. No further recommendations at this time.  Dr. YEAGER

## 2024-11-08 NOTE — PROGRESS NOTES
Patient presented to the clinic for a blood pressure check. Patient states she is taking her medication. Taken twice, 5 minutes apart. 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 The patient was not willing to wait any longer than 5 minutes in between blood pressures.     BP: 152/82  BP: 138/82

## 2024-11-14 ENCOUNTER — OFFICE VISIT (OUTPATIENT)
Dept: CARDIOLOGY | Facility: CLINIC | Age: 64
End: 2024-11-14
Payer: MEDICARE

## 2024-11-14 DIAGNOSIS — I25.10 ATHEROSCLEROSIS OF NATIVE CORONARY ARTERY OF NATIVE HEART WITHOUT ANGINA PECTORIS: ICD-10-CM

## 2024-11-14 DIAGNOSIS — I15.2 HYPERTENSION ASSOCIATED WITH DIABETES: ICD-10-CM

## 2024-11-14 DIAGNOSIS — E78.5 HYPERLIPIDEMIA ASSOCIATED WITH TYPE 2 DIABETES MELLITUS: ICD-10-CM

## 2024-11-14 DIAGNOSIS — I70.0 AORTIC ATHEROSCLEROSIS: Primary | ICD-10-CM

## 2024-11-14 DIAGNOSIS — E11.59 HYPERTENSION ASSOCIATED WITH DIABETES: ICD-10-CM

## 2024-11-14 DIAGNOSIS — E11.69 HYPERLIPIDEMIA ASSOCIATED WITH TYPE 2 DIABETES MELLITUS: ICD-10-CM

## 2024-11-14 PROCEDURE — 3288F FALL RISK ASSESSMENT DOCD: CPT | Mod: CPTII,S$GLB,, | Performed by: INTERNAL MEDICINE

## 2024-11-14 PROCEDURE — 1159F MED LIST DOCD IN RCRD: CPT | Mod: CPTII,S$GLB,, | Performed by: INTERNAL MEDICINE

## 2024-11-14 PROCEDURE — 99214 OFFICE O/P EST MOD 30 MIN: CPT | Mod: S$GLB,,, | Performed by: INTERNAL MEDICINE

## 2024-11-14 PROCEDURE — 3066F NEPHROPATHY DOC TX: CPT | Mod: CPTII,S$GLB,, | Performed by: INTERNAL MEDICINE

## 2024-11-14 PROCEDURE — 1160F RVW MEDS BY RX/DR IN RCRD: CPT | Mod: CPTII,S$GLB,, | Performed by: INTERNAL MEDICINE

## 2024-11-14 PROCEDURE — 99999 PR PBB SHADOW E&M-EST. PATIENT-LVL IV: CPT | Mod: PBBFAC,,, | Performed by: INTERNAL MEDICINE

## 2024-11-14 PROCEDURE — 3044F HG A1C LEVEL LT 7.0%: CPT | Mod: CPTII,S$GLB,, | Performed by: INTERNAL MEDICINE

## 2024-11-14 PROCEDURE — 3061F NEG MICROALBUMINURIA REV: CPT | Mod: CPTII,S$GLB,, | Performed by: INTERNAL MEDICINE

## 2024-11-14 PROCEDURE — 1101F PT FALLS ASSESS-DOCD LE1/YR: CPT | Mod: CPTII,S$GLB,, | Performed by: INTERNAL MEDICINE

## 2024-11-14 NOTE — PROGRESS NOTES
HISTORY:    64-year-old female with a history of CAD on CT chest, hypertension, hyperlipidemia, prev tob, and diabetes presenting for follow-up.    At last visit was complaining of RLE calf cramping at night when laying down at night. Massages it to try to help. Feels better when she walks. No daytime symptoms. No edema. Has since resolved.     The patient denies any symptoms of chest pain. Patient does note some LOERA and fatigue.     Remains active in her life. 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 Walks a lot for work.    Hasn't been taking any meds recently and A1c has increased significantly. Doesn't watch her diet.    The patient denies any previous history of myocardial infarction, peripheral arterial disease, stroke, congestive heart failure, or cardiomyopathy.    Tolerates amlodipine 10x1. Did not tolerate aceI due to swelling.       PHYSICAL EXAM:    Vitals:    11/14/24 1155   BP: (P) 130/79   Pulse: (P) 76       NAD, A+Ox3.  No jvd, no bruit.  RRR nml s1,s2. No murmurs.  CTA B no wheezes or crackles.  No edema.    LABS/STUDIES (imaging reviewed during clinic visit):    November 2024 CMP normal.  /HDL 48/LDL 98/.  A1c 5.9.  June 2023 CBC.  A1c 12.4. TSH normal.    ECG 2023 demonstrates NSR no Qs/Sts.   TST 2023 6 minutes on a high ramp protocol with no evidence of ischemia.  TTE 2023 normal LV size with mild concentric hypertrophy.  EF 65%.  CVP 3.  CT chest 2021 normal heart size.  Aortic and coronary atherosclerosis.    Venous ultrasound September 2024 no evidence of DVT bilaterally.  Left lower extremity GSV reflux is present.      ASSESSMENT & PLAN:    1. Aortic atherosclerosis    2. Atherosclerosis of native coronary artery of native heart without angina pectoris    3. Hyperlipidemia associated with type 2 diabetes mellitus    4. Hypertension associated with diabetes                 Pt with asymptomatic CAD. Nml TST '23. Defers statin tx.    Bps controlled on amlodipine  10x1 and HCTZ 25x1.     DM much better controlled at this time.    We discussed the importance of diet modifications and instituting an exercise regimen. Does well with activity.     Follow up if symptoms worsen or fail to improve.      Myra Holloway MD

## 2024-11-26 DIAGNOSIS — J44.9 CHRONIC OBSTRUCTIVE PULMONARY DISEASE, UNSPECIFIED COPD TYPE: ICD-10-CM

## 2024-11-26 RX ORDER — ALBUTEROL SULFATE 90 UG/1
INHALANT RESPIRATORY (INHALATION)
Qty: 54 G | Refills: 3 | Status: SHIPPED | OUTPATIENT
Start: 2024-11-26

## 2024-11-26 NOTE — TELEPHONE ENCOUNTER
Refill Decision Note   Linn Price  is requesting a refill authorization.  Brief Assessment and Rationale for Refill:  Approve     Medication Therapy Plan:        Comments:     Note composed:11:44 AM 11/26/2024

## 2024-12-03 ENCOUNTER — OFFICE VISIT (OUTPATIENT)
Dept: ENDOCRINOLOGY | Facility: CLINIC | Age: 64
End: 2024-12-03
Payer: MEDICARE

## 2024-12-03 VITALS
SYSTOLIC BLOOD PRESSURE: 126 MMHG | DIASTOLIC BLOOD PRESSURE: 76 MMHG | WEIGHT: 204.13 LBS | HEART RATE: 75 BPM | BODY MASS INDEX: 33.97 KG/M2

## 2024-12-03 DIAGNOSIS — E78.5 HYPERLIPIDEMIA ASSOCIATED WITH TYPE 2 DIABETES MELLITUS: ICD-10-CM

## 2024-12-03 DIAGNOSIS — E04.2 MULTINODULAR GOITER: ICD-10-CM

## 2024-12-03 DIAGNOSIS — E66.812 CLASS 2 SEVERE OBESITY DUE TO EXCESS CALORIES WITH SERIOUS COMORBIDITY AND BODY MASS INDEX (BMI) OF 35.0 TO 35.9 IN ADULT: ICD-10-CM

## 2024-12-03 DIAGNOSIS — E11.69 HYPERLIPIDEMIA ASSOCIATED WITH TYPE 2 DIABETES MELLITUS: ICD-10-CM

## 2024-12-03 DIAGNOSIS — E11.65 TYPE 2 DIABETES MELLITUS WITH HYPERGLYCEMIA, WITHOUT LONG-TERM CURRENT USE OF INSULIN: Primary | ICD-10-CM

## 2024-12-03 DIAGNOSIS — E55.9 VITAMIN D DEFICIENCY: ICD-10-CM

## 2024-12-03 DIAGNOSIS — E66.01 CLASS 2 SEVERE OBESITY DUE TO EXCESS CALORIES WITH SERIOUS COMORBIDITY AND BODY MASS INDEX (BMI) OF 35.0 TO 35.9 IN ADULT: ICD-10-CM

## 2024-12-03 PROBLEM — Z86.0100 HISTORY OF COLONIC POLYPS: Status: ACTIVE | Noted: 2024-12-03

## 2024-12-03 PROCEDURE — 99999 PR PBB SHADOW E&M-EST. PATIENT-LVL IV: CPT | Mod: PBBFAC,,, | Performed by: INTERNAL MEDICINE

## 2024-12-03 PROCEDURE — 3044F HG A1C LEVEL LT 7.0%: CPT | Mod: CPTII,S$GLB,, | Performed by: INTERNAL MEDICINE

## 2024-12-03 PROCEDURE — 3008F BODY MASS INDEX DOCD: CPT | Mod: CPTII,S$GLB,, | Performed by: INTERNAL MEDICINE

## 2024-12-03 PROCEDURE — 3074F SYST BP LT 130 MM HG: CPT | Mod: CPTII,S$GLB,, | Performed by: INTERNAL MEDICINE

## 2024-12-03 PROCEDURE — 1159F MED LIST DOCD IN RCRD: CPT | Mod: CPTII,S$GLB,, | Performed by: INTERNAL MEDICINE

## 2024-12-03 PROCEDURE — 99214 OFFICE O/P EST MOD 30 MIN: CPT | Mod: S$GLB,,, | Performed by: INTERNAL MEDICINE

## 2024-12-03 PROCEDURE — 3061F NEG MICROALBUMINURIA REV: CPT | Mod: CPTII,S$GLB,, | Performed by: INTERNAL MEDICINE

## 2024-12-03 PROCEDURE — 3288F FALL RISK ASSESSMENT DOCD: CPT | Mod: CPTII,S$GLB,, | Performed by: INTERNAL MEDICINE

## 2024-12-03 PROCEDURE — 1101F PT FALLS ASSESS-DOCD LE1/YR: CPT | Mod: CPTII,S$GLB,, | Performed by: INTERNAL MEDICINE

## 2024-12-03 PROCEDURE — 1126F AMNT PAIN NOTED NONE PRSNT: CPT | Mod: CPTII,S$GLB,, | Performed by: INTERNAL MEDICINE

## 2024-12-03 PROCEDURE — 1160F RVW MEDS BY RX/DR IN RCRD: CPT | Mod: CPTII,S$GLB,, | Performed by: INTERNAL MEDICINE

## 2024-12-03 PROCEDURE — 3066F NEPHROPATHY DOC TX: CPT | Mod: CPTII,S$GLB,, | Performed by: INTERNAL MEDICINE

## 2024-12-03 PROCEDURE — 3078F DIAST BP <80 MM HG: CPT | Mod: CPTII,S$GLB,, | Performed by: INTERNAL MEDICINE

## 2024-12-03 NOTE — PROGRESS NOTES
ENDOCRINOLOGY CLINIC    Subjective:      Chief Complaint:  Type 2 diabetes    HPI:   Linn Price is a 64 y.o. female who presents for follow up of type 2 diabetes.  Patient was last seen in the clinic on 05/29/2024.    Diabetes Hx:  Diagnosed w/ DM: Dx in her late 50s.   Complications:   Retinopathy:  Denies   Last eye exam: : 07/31/2024  Neuropathy: No paresthesias.   Last foot exam: Seen podiatrist 3 weeks back at [facility]. Follow up every 3 months for nail care.   Nephropathy: Denies  Cardiovascular: Asymptomatic CAD, follows up with cardiology.   Gastroparesis: Denies  DKA/HHS: Denies    Severe Hypoglycemia: Deies  Hypoglycemia unawareness: Denies  Hypoglycemic episodes: Patient denies any low blood sugars recently.    Current meds:   Metformin  mg, 2 tablets daily  Mounjaro 10 mg weekly - does report high cost, currently in the [facility].    Compliance with meds: Yes     Previous meds:  Trulicity - d/c after her blood sugars improved.   Ozempic 1 mg once a week     Home glucose checks:   Compliant: Yes, Once a day random- around 120-150s.     Diet/Exercise:   Takes a milk shake in the morning  Usually eats 2-3 meals a day.   Dinner at 6:30 pm, no bedtime snacks  Snacks on yogurt, hummus, cucumber during the day.    Takes unsweetened iced tea.   Reports some dietary noncompliance recently, has been craving for sweets.  Joined a gym but has not started it.  Swims regularly in the summers and with a friend.        Diabetes education:  10/2/2023    Last A1c:   Lab Results   Component Value Date    HGBA1C 5.9 (H) 08/29/2024    HGBA1C 5.9 (H) 04/05/2024    HGBA1C 6.6 (H) 10/06/2023     Microalbumin:   Lab Results   Component Value Date    LABMICR 27.0 04/05/2024    CREATRANDUR 137.0 04/05/2024    MICALBCREAT 19.7 04/05/2024     Lab Results   Component Value Date    EGFRNORACEVR >60.0 11/08/2024    CREATININE 0.7 11/08/2024     Lipids:   Lab Results   Component Value Date    CHOL 167 04/05/2024    TRIG 105  04/05/2024    HDL 48 04/05/2024    LDLCALC 98.0 04/05/2024    CHOLHDL 28.7 04/05/2024     TSH:  Lab Results   Component Value Date    TSH 0.965 06/14/2023       Lab Results   Component Value Date    HGB 13.1 06/14/2023        Aspirin: Yes  Statins: Never used statins. Patient was prescribed rosuvastatin but does not want start it due to side effects.   ACEI/ARB: No, Lisinopril d/c - Had lip swelling    Fatty liver: Yes  Has JANAE    HTN: On amlodipine, HCTZ    Denies history of pancreatitis or personal/FH of medullary thyroid cancer.  History recurrent UTI/fungal infection: Denies    Polyuria/Polydipsia: Denies    FHx of DM: Yes  Heart disease: Yes      Patient follows up with her PCP for her thyroid nodule and osteoporosis screening.  Normal BMD in 8/2023.   US thyroid 10/2023: Stable subcentimeter thyroid nodule which does not meet criteria for follow-up or fine-needle aspiration.       ROS: see HPI       Objective:       Physical Exam     /76 (BP Location: Right arm, Patient Position: Sitting)   Pulse 75   Wt 92.6 kg (204 lb 2.3 oz)   LMP  (LMP Unknown) Comment:    2009  BMI 33.97 kg/m²     Wt Readings from Last 3 Encounters:   12/03/24 92.6 kg (204 lb 2.3 oz)   11/14/24 (P) 92.9 kg (204 lb 12.9 oz)   10/15/24 92.6 kg (204 lb 2.3 oz)       Constitutional:  Pleasant,  in no acute distress.   HENT:   Head:    Normocephalic and atraumatic.   Eyes:    EOMI. No scleral icterus.   Cardiovascular:  Normal rate  Respiratory:   Effort normal   Neurological:  Normal speech    DIABETIC FOOT EXAM: 05/29/2024 - Normal sensation to microfilament testing bilaterally.  No fissures, wounds, or ulcers.    LABORATORY REVIEW:  See HPI for other labs reviewed today      Chemistry        Component Value Date/Time     11/08/2024 1016    K 4.7 11/08/2024 1016     11/08/2024 1016    CO2 25 11/08/2024 1016    BUN 15 11/08/2024 1016    BUN 13.0 06/20/2022 1528    CREATININE 0.7 11/08/2024 1016    GLU 99 11/08/2024  1016        Component Value Date/Time    CALCIUM 9.4 11/08/2024 1016    ALKPHOS 104 04/05/2024 0909    AST 19 04/05/2024 0909    ALT 23 04/05/2024 0909    BILITOT 0.4 04/05/2024 0909    ESTGFRAFRICA 102 05/01/2021 0828    EGFRNONAA 88 05/01/2021 0828          Lab Results   Component Value Date    HGBA1C 5.9 (H) 08/29/2024    HGBA1C 5.9 (H) 04/05/2024    HGBA1C 6.6 (H) 10/06/2023     Other labs reviewed today in HPI    Assessment/Plan:     1. Type 2 diabetes mellitus with hyperglycemia, without long-term current use of insulin  Assessment & Plan:    Last A1c had improved to 5.9.   Continue her current diabetes regimen.  Has lost about 12 lb since her last visit.  Continues to have craving on the Mounjaro.    Does report increased cost of the Mounjaro and will let me know if she wants to switch to an alternative.  She wants to continue her current dose for now.  Continue her metformin.    Reports some dietary noncompliance recently.    Follow-up with the diabetes educator as needed.    Discussed goal A1c of 6.5-7%  Call if blood sugars are persistently less than  70 or greater than 200.     Discussed importance of diet and lifestyle modifications for diabetes management  Hypoglycemia management discussed. Carry glucose tablets or snacks at all times.     Complications:  Follow up for regular diabetes eye exam  Daily self examination of feet  Microalbumin: Monitor.     Lab Results   Component Value Date    HGBA1C 5.9 (H) 08/29/2024           2. Hyperlipidemia associated with type 2 diabetes mellitus  Assessment & Plan:    Patient denies taking any statins and does not want start it.   Follows up with Cardiology for CAD.  Monitor lipids.          3. Class 2 severe obesity due to excess calories with serious comorbidity and body mass index (BMI) of 35.0 to 35.9 in adult  Assessment & Plan:    Continue Mounjaro.  Encouraged good diet and lifestyle modification.            Follow-up in 6 months with labs.    Heidy Coy  MD

## 2024-12-03 NOTE — ASSESSMENT & PLAN NOTE
Last A1c had improved to 5.9.   Continue her current diabetes regimen.  Has lost about 12 lb since her last visit.  Continues to have craving on the Mounjaro.    Does report increased cost of the Mounjaro and will let me know if she wants to switch to an alternative.  She wants to continue her current dose for now.  Continue her metformin.    Reports some dietary noncompliance recently.    Follow-up with the diabetes educator as needed.    Discussed goal A1c of 6.5-7%  Call if blood sugars are persistently less than  70 or greater than 200.     Discussed importance of diet and lifestyle modifications for diabetes management  Hypoglycemia management discussed. Carry glucose tablets or snacks at all times.     Complications:  Follow up for regular diabetes eye exam  Daily self examination of feet  Microalbumin: Monitor.     Lab Results   Component Value Date    HGBA1C 5.9 (H) 08/29/2024

## 2024-12-03 NOTE — ASSESSMENT & PLAN NOTE
Patient denies taking any statins and does not want start it.   Follows up with Cardiology for CAD.  Monitor lipids.

## 2024-12-12 ENCOUNTER — OFFICE VISIT (OUTPATIENT)
Dept: OBSTETRICS AND GYNECOLOGY | Facility: CLINIC | Age: 64
End: 2024-12-12
Payer: MEDICARE

## 2024-12-12 VITALS
DIASTOLIC BLOOD PRESSURE: 88 MMHG | SYSTOLIC BLOOD PRESSURE: 130 MMHG | WEIGHT: 205.94 LBS | BODY MASS INDEX: 34.27 KG/M2

## 2024-12-12 DIAGNOSIS — Z12.4 ENCOUNTER FOR PAPANICOLAOU SMEAR FOR CERVICAL CANCER SCREENING: ICD-10-CM

## 2024-12-12 DIAGNOSIS — L40.3 PALMOPLANTAR PUSTULAR PSORIASIS: ICD-10-CM

## 2024-12-12 DIAGNOSIS — N89.8 VAGINAL IRRITATION: ICD-10-CM

## 2024-12-12 DIAGNOSIS — Z12.31 SCREENING MAMMOGRAM FOR BREAST CANCER: ICD-10-CM

## 2024-12-12 DIAGNOSIS — Z01.419 ENCOUNTER FOR WELL WOMAN EXAM WITH ROUTINE GYNECOLOGICAL EXAM: Primary | ICD-10-CM

## 2024-12-12 DIAGNOSIS — Z78.0 POSTMENOPAUSAL: ICD-10-CM

## 2024-12-12 PROCEDURE — 1160F RVW MEDS BY RX/DR IN RCRD: CPT | Mod: CPTII,S$GLB,,

## 2024-12-12 PROCEDURE — 3288F FALL RISK ASSESSMENT DOCD: CPT | Mod: CPTII,S$GLB,,

## 2024-12-12 PROCEDURE — 3079F DIAST BP 80-89 MM HG: CPT | Mod: CPTII,S$GLB,,

## 2024-12-12 PROCEDURE — 3061F NEG MICROALBUMINURIA REV: CPT | Mod: CPTII,S$GLB,,

## 2024-12-12 PROCEDURE — 3075F SYST BP GE 130 - 139MM HG: CPT | Mod: CPTII,S$GLB,,

## 2024-12-12 PROCEDURE — 99999 PR PBB SHADOW E&M-EST. PATIENT-LVL V: CPT | Mod: PBBFAC,,,

## 2024-12-12 PROCEDURE — 1101F PT FALLS ASSESS-DOCD LE1/YR: CPT | Mod: CPTII,S$GLB,,

## 2024-12-12 PROCEDURE — 3008F BODY MASS INDEX DOCD: CPT | Mod: CPTII,S$GLB,,

## 2024-12-12 PROCEDURE — 99387 INIT PM E/M NEW PAT 65+ YRS: CPT | Mod: S$GLB,,,

## 2024-12-12 PROCEDURE — 3044F HG A1C LEVEL LT 7.0%: CPT | Mod: CPTII,S$GLB,,

## 2024-12-12 PROCEDURE — 1159F MED LIST DOCD IN RCRD: CPT | Mod: CPTII,S$GLB,,

## 2024-12-12 PROCEDURE — 3066F NEPHROPATHY DOC TX: CPT | Mod: CPTII,S$GLB,,

## 2024-12-12 NOTE — PROGRESS NOTES
Chief Complaint: Well Woman Exam     HPI:      Linn Pirce is a 64 y.o.  who presents today for well woman exam.  She is new to me.  Previous Dr. Catalan patient. LMP: No LMP recorded (lmp unknown). Patient is postmenopausal.  Hx of intermittent vaginal itching/irritation.  Uses Temovate as needed and this clears it up.  No other issues, problems, or complaints. Specifically, patient denies abnormal vaginal bleeding, discharge, pelvic pain, urinary problems, or changes in appetite. Ms. Price is not currently sexually active.     Previous Pap: NILM, HPV negative (2021)  HRT hx:  None  Previous Mammogram: BiRads: 1 T-C Score: 9.85% (2024)  Most Recent Dexa: WNL (2023), Repeat in   Most Recent Colonoscopy: Benign polyps (), Repeat in     FH:  Breast cancer: none  Colon cancer: none  Ovarian cancer: none  Endometrial cancer: none    Ms. Price confirms that she wears her seatbelt when riding in the car and does not text while driving.     OB History          0    Para   0    Term   0       0    AB   0    Living   0         SAB   0    IAB   0    Ectopic   0    Multiple   0    Live Births               Obstetric Comments   Menarche age 11.  LMP age 48.  History of abnormal PAP smear: NO.  History of abnormal mammogram: NO.  History of sexually transmitted disease:  NO                 ROS:     GENERAL: Denies unintentional weight gain or weight loss. Feeling well overall.   SKIN: Denies rash or lesions.   HEENT: Denies headaches, or vision changes.   CARDIOVASCULAR: Denies palpitations or chest pain.   RESPIRATORY: Denies shortness of breath or dyspnea on exertion.  BREASTS: Denies lumps or nipple discharge.   ABDOMEN: Denies constipation, diarrhea, nausea, vomiting, change in appetite.  URINARY: Denies frequency, dysuria.  NEUROLOGIC: Denies syncope or weakness.   PSYCHIATRIC: Denies uncontrolled depression or anxiety.    Physical Exam:      PHYSICAL EXAM:  BP  130/88 (BP Location: Left arm, Patient Position: Sitting)   Wt 93.4 kg (205 lb 14.6 oz)   LMP  (LMP Unknown) Comment:    2009  BMI 34.27 kg/m²   Body mass index is 34.27 kg/m².     APPEARANCE: Well nourished, well developed, in no acute distress.  PSYCH: Appropriate mood and affect.  SKIN: No acne or hirsutism  NECK: Neck symmetric without masses  NODES: No inguinal, axillary, or supraclavicular lymph node enlargement  ABDOMEN: Soft.  No tenderness or masses.    CARDIOVASCULAR: No edema of peripheral extremities  BREASTS: Symmetrical, no visible skin lesions. No palpable masses. No nipple discharge bilaterally.  PELVIC: External genitalia without lesions.  Normal hair distribution.  Adequate perineal body, normal urethral meatus.  Vagina atrophic. Without lesions. Vagina without discharge.  Cervix atrophic, without lesions, discharge or tenderness.  No significant cystocele or rectocele.  Bimanual exam shows uterus to be normal size, regular, mobile and nontender.  Adnexa without masses or tenderness.    Gyn note:      A female chaperone was present for the breast and pelvic exam.     Assessment/Plan:     Encounter for well woman exam with routine gynecological exam  -     Counseled patient regarding healthy diet and regular exercise, daily multivitamin, daily seat belt use.   -     BP normotensive  -     She denies abuse and feels safe at home.  -     Pap smear:  Not indicated - > 65 yrs, no hx of abnormal paps  -     MMG:  UTD (next due 07/2025), orders placed for patient to schedule for July 2025  -     Colon Cancer Screening: UTD, repeat in 2026    -     DEXA screening:  WNL (08/2023), Repeat 08/2025 - orders placed, pt to schedule    Encounter for Papanicolaou smear for cervical cancer screening  -     Ambulatory referral/consult to Obstetrics / Gynecology    Vaginal irritation  -     clobetasol 0.05% (TEMOVATE) 0.05 % Oint; Apply twice daily as needed for flare ups.  Dispense: 60 g; Refill:  3    Postmenopausal  -     DXA Bone Density Axial Skeleton 1 or more sites; Future    Screening mammogram for breast cancer  -     Mammo Digital Screening Bilat w/ Pranay; Future    Follow up in about 1 year for annual exam or sooner PRN.    Counseling:     Patient was counseled today on current ASCCP pap guidelines, the recommendation for yearly physical exams, healthy diet and exercise routines, safe driving habits, and breast self awareness and annual mammograms. She is to see her PCP for other health maintenance.     Use of the [email or web address] Patient Portal discussed and encouraged during today's visit.   Counseling time: 15 minutes    Salma Emerson (Maggie), LAUREN  Obstetrics and Gynecology  [facility]

## 2024-12-14 RX ORDER — CLOBETASOL PROPIONATE 0.5 MG/G
OINTMENT TOPICAL
Qty: 60 G | Refills: 3 | Status: SHIPPED | OUTPATIENT
Start: 2024-12-14

## 2024-12-19 DIAGNOSIS — E11.9 TYPE 2 DIABETES MELLITUS WITHOUT COMPLICATION, WITHOUT LONG-TERM CURRENT USE OF INSULIN: ICD-10-CM

## 2024-12-19 RX ORDER — METFORMIN HYDROCHLORIDE 500 MG/1
TABLET, EXTENDED RELEASE ORAL
Qty: 180 TABLET | Refills: 0 | Status: SHIPPED | OUTPATIENT
Start: 2024-12-19

## 2024-12-19 NOTE — TELEPHONE ENCOUNTER
Refill Decision Note   Linn Price  is requesting a refill authorization.    Brief Assessment and Rationale for Refill:  Approve       Medication Therapy Plan:  FLOS      Comments:     Note composed:2:00 PM 12/19/2024

## 2024-12-19 NOTE — TELEPHONE ENCOUNTER
Care Due:                  Date            Visit Type   Department     Provider  --------------------------------------------------------------------------------                                EP -                              PRIMARY      [facility]  Last Visit: 10-      CARE ([facility])   ANDREW Mcclure  Next Visit: None Scheduled  None         None Found                                                            Last  Test          Frequency    Reason                     Performed    Due Date  --------------------------------------------------------------------------------    HBA1C.......  6 months...  metFORMIN................  08- 02-    [facility] Embedded Care Due Messages. Reference number: [id number].   12/19/2024 3:41:34 AM CST

## 2024-12-27 DIAGNOSIS — E11.9 TYPE 2 DIABETES MELLITUS WITHOUT COMPLICATION, WITHOUT LONG-TERM CURRENT USE OF INSULIN: ICD-10-CM

## 2024-12-27 RX ORDER — METFORMIN HYDROCHLORIDE 500 MG/1
TABLET, EXTENDED RELEASE ORAL
Qty: 180 TABLET | Refills: 0 | Status: SHIPPED | OUTPATIENT
Start: 2024-12-27

## 2024-12-27 NOTE — TELEPHONE ENCOUNTER
No care due was identified.  [facility] Embedded Care Due Messages. Reference number: [id number].   12/27/2024 8:07:48 AM CST

## 2024-12-27 NOTE — TELEPHONE ENCOUNTER
Refill Decision Note   Linn Price  is requesting a refill authorization.    Brief Assessment and Rationale for Refill:  Approve       Medication Therapy Plan:         Comments:     Note composed:1:37 PM 12/27/2024

## 2025-01-14 DIAGNOSIS — E11.59 HYPERTENSION ASSOCIATED WITH DIABETES: ICD-10-CM

## 2025-01-14 DIAGNOSIS — I15.2 HYPERTENSION ASSOCIATED WITH DIABETES: ICD-10-CM

## 2025-01-14 RX ORDER — CETIRIZINE HYDROCHLORIDE 10 MG/1
10 TABLET ORAL DAILY
Qty: 180 TABLET | Refills: 3 | Status: SHIPPED | OUTPATIENT
Start: 2025-01-14

## 2025-01-14 RX ORDER — AMLODIPINE BESYLATE 5 MG/1
5 TABLET ORAL DAILY
Qty: 90 TABLET | Refills: 0 | Status: SHIPPED | OUTPATIENT
Start: 2025-01-14 | End: 2026-01-14

## 2025-01-14 RX ORDER — HYDROCHLOROTHIAZIDE 12.5 MG/1
TABLET ORAL
Qty: 90 TABLET | Refills: 0 | Status: SHIPPED | OUTPATIENT
Start: 2025-01-14

## 2025-01-14 NOTE — TELEPHONE ENCOUNTER
No care due was identified.  [facility] Embedded Care Due Messages. Reference number: [id number].   1/14/2025 11:22:07 AM CST

## 2025-01-24 DIAGNOSIS — E11.65 TYPE 2 DIABETES MELLITUS WITH HYPERGLYCEMIA, WITHOUT LONG-TERM CURRENT USE OF INSULIN: ICD-10-CM

## 2025-01-26 RX ORDER — TIRZEPATIDE 10 MG/.5ML
10 INJECTION, SOLUTION SUBCUTANEOUS
Qty: 4 PEN | Refills: 2 | Status: SHIPPED | OUTPATIENT
Start: 2025-01-26

## 2025-04-16 ENCOUNTER — PATIENT MESSAGE (OUTPATIENT)
Dept: PRIMARY CARE CLINIC | Facility: CLINIC | Age: 65
End: 2025-04-16
Payer: MEDICARE

## 2025-04-16 DIAGNOSIS — I15.2 HYPERTENSION ASSOCIATED WITH DIABETES: ICD-10-CM

## 2025-04-16 DIAGNOSIS — I15.2 HYPERTENSION ASSOCIATED WITH DIABETES: Primary | ICD-10-CM

## 2025-04-16 DIAGNOSIS — E11.59 HYPERTENSION ASSOCIATED WITH DIABETES: ICD-10-CM

## 2025-04-16 DIAGNOSIS — E11.9 TYPE 2 DIABETES MELLITUS WITHOUT COMPLICATION, WITHOUT LONG-TERM CURRENT USE OF INSULIN: ICD-10-CM

## 2025-04-16 DIAGNOSIS — E11.59 HYPERTENSION ASSOCIATED WITH DIABETES: Primary | ICD-10-CM

## 2025-04-16 DIAGNOSIS — E78.5 HYPERLIPIDEMIA, UNSPECIFIED HYPERLIPIDEMIA TYPE: ICD-10-CM

## 2025-04-16 RX ORDER — HYDROCHLOROTHIAZIDE 12.5 MG/1
12.5 TABLET ORAL EVERY MORNING
Qty: 90 TABLET | Refills: 0 | Status: SHIPPED | OUTPATIENT
Start: 2025-04-16

## 2025-04-16 RX ORDER — AMLODIPINE BESYLATE 5 MG/1
5 TABLET ORAL DAILY
Qty: 90 TABLET | Refills: 0 | Status: SHIPPED | OUTPATIENT
Start: 2025-04-16

## 2025-04-16 RX ORDER — AMLODIPINE BESYLATE 5 MG/1
5 TABLET ORAL
Qty: 90 TABLET | Refills: 1 | Status: SHIPPED | OUTPATIENT
Start: 2025-04-16 | End: 2025-04-16 | Stop reason: SDUPTHER

## 2025-04-16 RX ORDER — HYDROCHLOROTHIAZIDE 12.5 MG/1
12.5 TABLET ORAL EVERY MORNING
Qty: 90 TABLET | Refills: 1 | Status: SHIPPED | OUTPATIENT
Start: 2025-04-16 | End: 2025-04-16

## 2025-04-16 RX ORDER — METFORMIN HYDROCHLORIDE 500 MG/1
TABLET, EXTENDED RELEASE ORAL
Qty: 180 TABLET | Refills: 0 | Status: SHIPPED | OUTPATIENT
Start: 2025-04-16

## 2025-04-16 NOTE — TELEPHONE ENCOUNTER
Refill Decision Note   Linn Price  is requesting a refill authorization.  Brief Assessment and Rationale for Refill:  Approve     Medication Therapy Plan:         Comments:     Note composed:11:14 AM 04/16/2025             Dutasteride Pregnancy And Lactation Text: This medication is absolutely contraindicated in women, especially during pregnancy and breast feeding. Feminization of male fetuses is possible if taking while pregnant.

## 2025-04-16 NOTE — TELEPHONE ENCOUNTER
Ok, Let's add a CMP, FLP, CBC. I put in the orders. Please link to her labs fr Dr. Zbigniew YEAGER

## 2025-04-16 NOTE — TELEPHONE ENCOUNTER
I cancelled the extra refill sent by Refill program. Pt due for fu.   Help her sched an appt  Dr. YEAGER

## 2025-04-16 NOTE — TELEPHONE ENCOUNTER
Care Due:                  Date            Visit Type   Department     Provider  --------------------------------------------------------------------------------                                EP -                              PRIMARY      [facility]  Last Visit: 10-      CARE ([facility])   ANDREW Mcclure  Next Visit: None Scheduled  None         None Found                                                            Last  Test          Frequency    Reason                     Performed    Due Date  --------------------------------------------------------------------------------    HBA1C.......  6 months...  metFORMIN................  08- 02-    [facility] Embedded Care Due Messages. Reference number: [id number].   4/16/2025 3:41:01 AM CDT

## 2025-04-16 NOTE — TELEPHONE ENCOUNTER
Spoke With Pt Schedule her Annual for 5/2/25  Pt would like Fasting labs order before Appt      Please Advised

## 2025-04-28 ENCOUNTER — LAB VISIT (OUTPATIENT)
Dept: LAB | Facility: HOSPITAL | Age: 65
End: 2025-04-28
Attending: INTERNAL MEDICINE
Payer: MEDICARE

## 2025-04-28 ENCOUNTER — RESULTS FOLLOW-UP (OUTPATIENT)
Dept: PRIMARY CARE CLINIC | Facility: CLINIC | Age: 65
End: 2025-04-28

## 2025-04-28 DIAGNOSIS — E04.2 MULTINODULAR GOITER: ICD-10-CM

## 2025-04-28 DIAGNOSIS — E11.9 TYPE 2 DIABETES MELLITUS WITHOUT COMPLICATION, WITHOUT LONG-TERM CURRENT USE OF INSULIN: ICD-10-CM

## 2025-04-28 DIAGNOSIS — E78.5 HYPERLIPIDEMIA, UNSPECIFIED HYPERLIPIDEMIA TYPE: ICD-10-CM

## 2025-04-28 DIAGNOSIS — E11.59 HYPERTENSION ASSOCIATED WITH DIABETES: ICD-10-CM

## 2025-04-28 DIAGNOSIS — I15.2 HYPERTENSION ASSOCIATED WITH DIABETES: ICD-10-CM

## 2025-04-28 DIAGNOSIS — E55.9 VITAMIN D DEFICIENCY: ICD-10-CM

## 2025-04-28 DIAGNOSIS — E11.65 TYPE 2 DIABETES MELLITUS WITH HYPERGLYCEMIA, WITHOUT LONG-TERM CURRENT USE OF INSULIN: ICD-10-CM

## 2025-04-28 LAB
25(OH)D3+25(OH)D2 SERPL-MCNC: 70 NG/ML (ref 30–96)
ABSOLUTE EOSINOPHIL (OHS): 0.19 K/UL
ABSOLUTE MONOCYTE (OHS): 0.52 K/UL (ref 0.3–1)
ABSOLUTE NEUTROPHIL COUNT (OHS): 4.83 K/UL (ref 1.8–7.7)
ALBUMIN SERPL BCP-MCNC: 3.7 G/DL (ref 3.5–5.2)
ALBUMIN/CREAT UR: 13.5 UG/MG
ALP SERPL-CCNC: 95 UNIT/L (ref 40–150)
ALT SERPL W/O P-5'-P-CCNC: 18 UNIT/L (ref 10–44)
ANION GAP (OHS): 12 MMOL/L (ref 8–16)
AST SERPL-CCNC: 17 UNIT/L (ref 11–45)
BASOPHILS # BLD AUTO: 0.03 K/UL
BASOPHILS NFR BLD AUTO: 0.4 %
BILIRUB SERPL-MCNC: 0.3 MG/DL (ref 0.1–1)
BUN SERPL-MCNC: 15 MG/DL (ref 8–23)
CALCIUM SERPL-MCNC: 9.8 MG/DL (ref 8.7–10.5)
CHLORIDE SERPL-SCNC: 105 MMOL/L (ref 95–110)
CHOLEST SERPL-MCNC: 175 MG/DL (ref 120–199)
CHOLEST/HDLC SERPL: 3.7 {RATIO} (ref 2–5)
CO2 SERPL-SCNC: 25 MMOL/L (ref 23–29)
CREAT SERPL-MCNC: 0.7 MG/DL (ref 0.5–1.4)
CREAT UR-MCNC: 156 MG/DL (ref 15–325)
EAG (OHS): 120 MG/DL (ref 68–131)
ERYTHROCYTE [DISTWIDTH] IN BLOOD BY AUTOMATED COUNT: 13.2 % (ref 11.5–14.5)
GFR SERPLBLD CREATININE-BSD FMLA CKD-EPI: >60 ML/MIN/1.73/M2
GLUCOSE SERPL-MCNC: 104 MG/DL (ref 70–110)
HBA1C MFR BLD: 5.8 % (ref 4–5.6)
HCT VFR BLD AUTO: 38.2 % (ref 37–48.5)
HDLC SERPL-MCNC: 47 MG/DL (ref 40–75)
HDLC SERPL: 26.9 % (ref 20–50)
HGB BLD-MCNC: 11.6 GM/DL (ref 12–16)
IMM GRANULOCYTES # BLD AUTO: 0.03 K/UL (ref 0–0.04)
IMM GRANULOCYTES NFR BLD AUTO: 0.4 % (ref 0–0.5)
LDLC SERPL CALC-MCNC: 113.2 MG/DL (ref 63–159)
LYMPHOCYTES # BLD AUTO: 2.21 K/UL (ref 1–4.8)
MCH RBC QN AUTO: 26.7 PG (ref 27–31)
MCHC RBC AUTO-ENTMCNC: 30.4 G/DL (ref 32–36)
MCV RBC AUTO: 88 FL (ref 82–98)
MICROALBUMIN UR-MCNC: 21 UG/ML (ref ?–5000)
NONHDLC SERPL-MCNC: 128 MG/DL
NUCLEATED RBC (/100WBC) (OHS): 0 /100 WBC
PLATELET # BLD AUTO: 419 K/UL (ref 150–450)
PMV BLD AUTO: 9.8 FL (ref 9.2–12.9)
POTASSIUM SERPL-SCNC: 4.3 MMOL/L (ref 3.5–5.1)
PROT SERPL-MCNC: 7.8 GM/DL (ref 6–8.4)
RBC # BLD AUTO: 4.35 M/UL (ref 4–5.4)
RELATIVE EOSINOPHIL (OHS): 2.4 %
RELATIVE LYMPHOCYTE (OHS): 28.3 % (ref 18–48)
RELATIVE MONOCYTE (OHS): 6.7 % (ref 4–15)
RELATIVE NEUTROPHIL (OHS): 61.8 % (ref 38–73)
SODIUM SERPL-SCNC: 142 MMOL/L (ref 136–145)
TRIGL SERPL-MCNC: 74 MG/DL (ref 30–150)
TSH SERPL-ACNC: 1.4 UIU/ML (ref 0.4–4)
WBC # BLD AUTO: 7.81 K/UL (ref 3.9–12.7)

## 2025-04-28 PROCEDURE — 82306 VITAMIN D 25 HYDROXY: CPT

## 2025-04-28 PROCEDURE — 84443 ASSAY THYROID STIM HORMONE: CPT

## 2025-04-28 PROCEDURE — 82570 ASSAY OF URINE CREATININE: CPT

## 2025-04-28 PROCEDURE — 85025 COMPLETE CBC W/AUTO DIFF WBC: CPT

## 2025-04-28 PROCEDURE — 80061 LIPID PANEL: CPT

## 2025-04-28 PROCEDURE — 82040 ASSAY OF SERUM ALBUMIN: CPT

## 2025-04-28 PROCEDURE — 36415 COLL VENOUS BLD VENIPUNCTURE: CPT

## 2025-04-28 PROCEDURE — 83036 HEMOGLOBIN GLYCOSYLATED A1C: CPT

## 2025-04-28 NOTE — PROGRESS NOTES
I sent pt a my chart message -  I reviewed your labs.  Your Cholesterol looked high. Your goal LDL is < 70.  I would again recommend a statin. Let me know if you have changed your mind and I can send you a prescription.  Your kidney function and liver functions looked good.  You remain mildly  anemic but this is stable. No further recommendations at this time.  Dr. YEAGER

## 2025-04-28 NOTE — PROGRESS NOTES
ENDOCRINOLOGY CLINIC    Subjective:      Chief Complaint:  Type 2 diabetes    HPI:   Linn Price is a 65 y.o. female who presents for follow up of type 2 diabetes. Patient was last seen in the clinic on 12/03/2024.    Diabetes Hx:  Diagnosed w/ DM: Dx in her late 50s.   Complications:   Retinopathy:  Denies   Last eye exam: 4/2025 at [facility].  Neuropathy: Denies paresthesias.   Last foot exam: Follows up every 3 months for nail and foot care. Has eczema of her feet.    Nephropathy: Denies  Cardiovascular: Asymptomatic CAD, follows up with cardiology.   Gastroparesis: Denies  DKA/HHS: Denies    Severe Hypoglycemia: Deies  Hypoglycemia unawareness: Denies  Hypoglycemic episodes: Patient denies any low blood sugars recently.    Current meds:   Metformin  mg, 2 tablets daily  Mounjaro 10 mg weekly    Compliance with meds: Yes     Previous meds:  Trulicity - d/c after her blood sugars improved.   Ozempic 1 mg once a week     Home glucose checks:   Compliant: Yes, Once a day random - around 120-150s.     Diet/Exercise:   Takes a milk shake in the morning  Usually eats 2-3 meals a day.   Dinner at 6:30 pm, no bedtime snacks  Snacks on yogurt, hummus, cucumber during the day.    Takes unsweetened iced tea.   Swims regularly in the summers and with a friend.    Less activity recently.     Diabetes education:  10/2/2023    Last A1c:   Lab Results   Component Value Date    HGBA1C 5.8 (H) 04/28/2025    HGBA1C 5.9 (H) 08/29/2024    HGBA1C 5.9 (H) 04/05/2024     Microalbumin:   Lab Results   Component Value Date    LABMICR 21.0 04/28/2025    CREATRANDUR 156.0 04/28/2025    MICALBCREAT 13.5 04/28/2025     Lab Results   Component Value Date    EGFRNORACEVR >60 04/28/2025    CREATININE 0.7 04/28/2025     Lipids:   Lab Results   Component Value Date    CHOL 175 04/28/2025    TRIG 74 04/28/2025    HDL 47 04/28/2025    LDLCALC 98.0 04/05/2024    CHOLHDL 26.9 04/28/2025     TSH:  Lab Results   Component Value Date    TSH 1.404  04/28/2025       Lab Results   Component Value Date    HGB 11.6 (L) 04/28/2025        Aspirin: Yes  Statins: Never used statins. Patient was prescribed rosuvastatin but does not want start it due to side effects.   ACEI/ARB: No, Lisinopril d/c - Had lip swelling    Fatty liver: Yes  Has JANAE    HTN: On amlodipine, HCTZ    Denies history of pancreatitis or personal/FH of medullary thyroid cancer.  History recurrent UTI/fungal infection: Denies    Polyuria/Polydipsia: Denies    FHx of DM: Yes  Heart disease: Yes      Patient follows up with her PCP for her thyroid nodule and osteoporosis screening.  Normal BMD in 8/2023.   US thyroid 10/2023: Stable subcentimeter thyroid nodule which does not meet criteria for follow-up or fine-needle aspiration.     ROS: see HPI       Objective:       Physical Exam     /75 (BP Location: Right arm, Patient Position: Sitting)   Pulse 66   Wt 90.2 kg (198 lb 13.7 oz)   LMP  (LMP Unknown) Comment:    2009  SpO2 99%   BMI 33.09 kg/m²     Wt Readings from Last 3 Encounters:   12/12/24 93.4 kg (205 lb 14.6 oz)   12/03/24 92.6 kg (204 lb 2.3 oz)   11/14/24 (P) 92.9 kg (204 lb 12.9 oz)       Constitutional:  Pleasant,  in no acute distress.   HENT:   Eyes:    EOMI. No scleral icterus.   Cardiovascular:  Normal rate  Respiratory:   Effort normal   Neurological:  Normal speech    DIABETIC FOOT EXAM: 05/15/2025 Normal sensation to microfilament testing bilaterally.  No fissures, wounds, or ulcers. Dry skin/scaling.       LABORATORY REVIEW:  See HPI for other labs reviewed today      Chemistry        Component Value Date/Time     04/28/2025 0854     11/08/2024 1016    K 4.3 04/28/2025 0854    K 4.7 11/08/2024 1016     04/28/2025 0854     11/08/2024 1016    CO2 25 04/28/2025 0854    CO2 25 11/08/2024 1016    BUN 15 04/28/2025 0854    CREATININE 0.7 04/28/2025 0854    GLU 99 11/08/2024 1016        Component Value Date/Time    CALCIUM 9.8 04/28/2025 0854     CALCIUM 9.4 11/08/2024 1016    ALKPHOS 95 04/28/2025 0854    ALKPHOS 104 04/05/2024 0909    AST 17 04/28/2025 0854    AST 19 04/05/2024 0909    ALT 18 04/28/2025 0854    ALT 23 04/05/2024 0909    BILITOT 0.3 04/28/2025 0854    BILITOT 0.4 04/05/2024 0909    ESTGFRAFRICA 102 05/01/2021 0828    EGFRNONAA 88 05/01/2021 0828          Lab Results   Component Value Date    HGBA1C 5.8 (H) 04/28/2025    HGBA1C 5.9 (H) 08/29/2024    HGBA1C 5.9 (H) 04/05/2024     Other labs reviewed today in HPI    Assessment/Plan:     1. Type 2 diabetes mellitus with hyperglycemia, without long-term current use of insulin  Assessment & Plan:    Last A1c is stable at 5.8.   Continue her current diabetes regimen.  Has lost about 6 lb since her last visit.    Follow-up with the diabetes educator as needed.    Discussed goal A1c of <7%  Call if blood sugars are persistently less than  70 or greater than 200.     Discussed importance of diet and lifestyle modifications for diabetes management  Hypoglycemia management discussed. Carry glucose tablets or snacks at all times.     Complications:  Follow up for regular diabetes eye exam  Daily self examination of feet  Microalbumin: Monitor.     Lab Results   Component Value Date    HGBA1C 5.8 (H) 04/28/2025         Orders:  -     tirzepatide (MOUNJARO) 10 mg/0.5 mL PnIj; Inject 10 mg into the skin every 7 days.  Dispense: 6 mL; Refill: 2  -     metFORMIN (GLUCOPHAGE-XR) 500 MG ER 24hr tablet; TAKE 2 TABLETS BY MOUTH EVERY NIGHT AT BEDTIME  Dispense: 180 tablet; Refill: 2  -     Hemoglobin A1C; Future; Expected date: 11/15/2025  -     Renal Function Panel; Future; Expected date: 11/15/2025    2. Hyperlipidemia associated with type 2 diabetes mellitus  Assessment & Plan:    Patient denies taking any statins and does not want start it.   Follows up with Cardiology for CAD.  Monitor lipids.          3. Class 2 severe obesity due to excess calories with serious comorbidity and body mass index (BMI) of  35.0 to 35.9 in adult  Assessment & Plan:    Continue Mounjaro.  Encouraged good diet and lifestyle modification.            Follow-up in 6 months with labs.    Heidy Coy MD

## 2025-05-02 ENCOUNTER — OFFICE VISIT (OUTPATIENT)
Dept: PRIMARY CARE CLINIC | Facility: CLINIC | Age: 65
End: 2025-05-02
Payer: MEDICARE

## 2025-05-02 VITALS
WEIGHT: 199.06 LBS | OXYGEN SATURATION: 96 % | TEMPERATURE: 98 F | DIASTOLIC BLOOD PRESSURE: 78 MMHG | RESPIRATION RATE: 16 BRPM | BODY MASS INDEX: 33.16 KG/M2 | HEART RATE: 74 BPM | SYSTOLIC BLOOD PRESSURE: 130 MMHG | HEIGHT: 65 IN

## 2025-05-02 DIAGNOSIS — G89.29 CHRONIC NECK PAIN: ICD-10-CM

## 2025-05-02 DIAGNOSIS — I70.0 AORTIC ATHEROSCLEROSIS: ICD-10-CM

## 2025-05-02 DIAGNOSIS — D64.9 NORMOCYTIC ANEMIA: ICD-10-CM

## 2025-05-02 DIAGNOSIS — E11.59 HYPERTENSION ASSOCIATED WITH DIABETES: ICD-10-CM

## 2025-05-02 DIAGNOSIS — I15.2 HYPERTENSION ASSOCIATED WITH DIABETES: ICD-10-CM

## 2025-05-02 DIAGNOSIS — M54.2 CHRONIC NECK PAIN: ICD-10-CM

## 2025-05-02 DIAGNOSIS — F17.211 NICOTINE DEPENDENCE, CIGARETTES, IN REMISSION: ICD-10-CM

## 2025-05-02 DIAGNOSIS — G47.33 OSA ON CPAP: ICD-10-CM

## 2025-05-02 DIAGNOSIS — E78.5 HYPERLIPIDEMIA, UNSPECIFIED HYPERLIPIDEMIA TYPE: ICD-10-CM

## 2025-05-02 DIAGNOSIS — D53.9 NUTRITIONAL ANEMIA, UNSPECIFIED: ICD-10-CM

## 2025-05-02 DIAGNOSIS — E11.9 TYPE 2 DIABETES MELLITUS WITHOUT COMPLICATION, WITHOUT LONG-TERM CURRENT USE OF INSULIN: Primary | ICD-10-CM

## 2025-05-02 PROCEDURE — 99999 PR PBB SHADOW E&M-EST. PATIENT-LVL V: CPT | Mod: PBBFAC,,, | Performed by: INTERNAL MEDICINE

## 2025-05-02 RX ORDER — TIZANIDINE 4 MG/1
4 TABLET ORAL EVERY 8 HOURS PRN
Qty: 60 TABLET | Refills: 2 | Status: SHIPPED | OUTPATIENT
Start: 2025-05-02

## 2025-05-02 NOTE — PROGRESS NOTES
[facility] Note    Chief Complaint      Chief Complaint   Patient presents with    Annual Exam       History of Present Illness      Linn Price is a 65 y.o.   WF with HTN, DM - II, JANAE on CPAP, Chronic Constipation, GERD, Colon polyps,Pulm nodule, Noncompliance, and Nicotine Dependence in Remission presents to fu well visit and chronic issues. Last virtual visit - 10/15/24      Venous insuff - venous us - 9/26/24 - no DVT. The left greater saphenous vein has reflux.        Chronic Urticaria/Angioedema - UC - 8/16/23 - Allergic Rx/lip swelling - Rx Benadryl and allegra. Fu by A/I, Dr. Cooper. Pt on Zyrtec 1tabs BID. She just had allergy testing.  She was started on Lisinopril in June 2023.  Sx's preceded this medication. No trouble breathing or swallowing. +anti TPO - 32 and + antithyroglobulin - 33.  Is she a candidate for Xolair? We stopped Lisinopril due to chronic Hives.  Digital BP program started amlodipine 5 mg 12/20/23.  She takes antihistamine BID.      H/o Noncompliance -   Pt reports she is no longer missing her meds.     Pulm nodules -  CT chest -6/24/24 -   showed stable pulmonary nodules. Degenerative changes (arthritis) in the spine. A small pericardial effusion is again noted. Suspected mild coronary calcification. We will repeat CT chest in 1 yr      Thyroid nodule - Thyroid u/s - 10/16/23 - Stable subcentimeter (9mm and 6 mm) Multinodular goiter which does not meet criteria for fine-needle aspiration.  +anti TPO - 32 and + antithyroglobulin - 33. Pt euthyroid.      COPD - Pt with prev PFT's at  - + Obstruction. Cont prn albuterol - has not needed.  +SOB with house work.       Nicotine Dependence -in remission - Pt quit 1/12/20. Congratulated on cessation.       HTN - She saw Dr. Jewel Piña. She is fu by digital BP monitoring program.   Cont to monitor.   Pt fu A/I carnes for recent food allergies with lip swelling first. Pt would like a fluid pill to help with intermittent  edema. She will monitor BP closely and alert me for any concerns.  Recently stopped amlodipine 10mg/d due to leg cramps which improved off it.   BP readings elev this wk. Pt prev. on HCTZ 12.5 mg daily prn edema - will resume 12.5 mg/d and resume Amlodipine at low dose 5 mg/d. Saw Dr. Holloway and was started on  crestor (she did not start Crestor). She reports being told she is a candidate for ablation.  Echo - 6/23/23 - mild concentric hypertrophy and normal systolic function. EF 65%; Grade I Diastolic dysf. Mild aortic regurg. Exercise Stress Test - 6/26/23  - neg. Pt on Amlodipine 10 mg/d.       Left renal cyst - 1.8 cm simple parapelvic cyst. Lower pole Left kidney. Reassured.      DM - II - Ha1c is controlled at 58 - 4/28/25.  Cont Metformin  mg 2 tabs po QHS and Mounjaro 10 mg/wk. She had not bumped Metformin to 3 tabs. No evidence of microalbuminuria.  She reports some constipation.  Rec inc hydration and try stool softeners or Miralax prn. Has a BM Q3-4 days.  Has fu with Endo, Dr. Coy, in Dec.  No microalbuminuria - 4/28/25.   Fu with endo - 5/15/25.       JANAE on CPAP - She uses APAP it nightly x 6-7 hrs. Rec low carb diet and exercise for wt loss. Doing well. +fatigue.      Chronic constipation - Doing well at present.   Rec adeq hydration and Miralax prn - has not been needing.       Colon polyps - Fu by Dr. Swain/Magen.  Repeat 9/2026      Hemorrhoids - Fu by Dr. Tracy.  Cont conservative Mgmnt.      GERD - Rare. Rec reflux prec. Can take Pepcid prn-rarely needs it.      Vit D def - 70 up from 14- Pt completed Ergocalciferol once weekly. Pt currently on Vit D3 2000 u/d.       Dishydrotic Eczema- Dx via Bx. Prev fu by Derm - Dr. Sky. Doing better.  She is on light therapy. She can walk and use shoes now.  She is also on topicals.      Chronic Otitis Externa - Fu by ENT, Dr. David.  Pt on Acetic acid drops prn.      Obesity - BMI - 33.13 down 2 lb since last visit. Rec low carb diet and  exercising.  Plans to resume exercise     HLD - Pt has not started her Crestor that cards started. She declines. I asked her to reconsider. Atherosclerosis including coronary arteries.  She is on Aspirin 81mg. She still declines statin for now.  Her cholesterol was not controlled. Her goal LDL is < 70. Cont to monitor.    TG 74 HDL 47  - 4/28/25.     Atherosclerosis - seen on prev CT. Declines statin. Takes ASA 81 mg sometimes.      Normocytic anemia - H/H -  11.6/38.2 MCV - 88 - 4/28/25. Recheck iron studies     Neck pain x 6-8 mos-   C/o stiffness. No numbness/tingling/weakness. + DDD. Fu by Back and Spine. MRI C spine - 2/16/24- mild spinal stenosis. Ptx was no help.      Lab review:   5/22/20 - WBC - 7.6;  H/H - 12.1/36.3; Plt - 338;  BUN/Cr - 12/0.7; LFt's - wnl;   TG 80; Ha1c - 6.9; TSH - 1.52;  Vit D -14      HCM - Flu - none - refuses; RSV - none -declines;  Tdap -2/1/22;  PCV 13 - none - declines;  PVX 23 - none - declines;  Shingrix - none - declines;  COVID -19 vaccine #1 - declines; MGM - 7/11/24 -repeat 1 yr- has order;   DEXA - 8/15/23 - wnl;  PAP -7/6/21 - neg; Hep C Screen - 6/20/22 - neg;  HIV Screen - none - defers; C-scope - 8/30/23 -polyps and hemorrhoids -  repeat 3 yrs;  Prev PCP - me at [facility] and then Dr. Armenta; Prev OB/GYN - Dr. Catalan;  GI - Dr. Swain; A/I - Dr. Cooper;  ENT- Dr. David; Derm - Dr. Downey; Ophtho - Dr. Shin; Rheum - Dr. Campoverde; Cards - Dr. Holloway; well visit - 1/21/22     Patient Care Team:  Erika Mcclure MD as PCP - General (Internal Medicine)  Brent Wilson MD as Obstetrician (Obstetrics)  Santana Swain MD as Consulting Physician (Gastroenterology)  Daniel Bynum MD as Consulting Physician (General Surgery)  Eugene Fontana Jr., MD as Consulting Physician (Otolaryngology)  Chong Tang MD as Consulting Physician (Ophthalmology)  Erika Mcclure MD as Hypertension Digital Medicine Responsible Provider (Internal  Medicine)  Erika Mcclure MD as Diabetes Digital Medicine Responsible Provider (Internal Medicine)  Medicare, Digital Medicine as Hypertension Digital Medicine Contract  Medicare, Digital Medicine as Diabetes Digital Medicine Contract  Viola Morley as Hypertension Digital Medicine Clinician  Viola Morley as Diabetes Digital Medicine Clinician     Health Maintenance:  Immunization History   Administered Date(s) Administered    Tetanus 02/01/2022      Health Maintenance   Topic Date Due    Aspirin/Antiplatelet Therapy  Never done    High Dose Statin  Never done    RSV Vaccine (Age 60+ and Pregnant patients) (1 - Risk 60-74 years 1-dose series) Never done    Foot Exam  05/16/2024    Mammogram  07/11/2025    Diabetic Eye Exam  07/31/2025    LDCT Lung Screen  06/24/2025    DEXA Scan  08/15/2025    Influenza Vaccine (Season Ended) 09/01/2025    Hemoglobin A1c  10/28/2025    Diabetes Urine Screening  04/28/2026    Lipid Panel  04/28/2026    Colorectal Cancer Screening  09/05/2026    TETANUS VACCINE  02/01/2032    Hepatitis C Screening  Completed    Shingles Vaccine  Discontinued    COVID-19 Vaccine  Discontinued    Pneumococcal Vaccines (Age 50+)  Discontinued        Past Medical History:  Past Medical History:   Diagnosis Date    Allergy     Benign hypertension 08/19/2014    Broken fingers     Broken toe     Colon polyps 08/19/2014    Diabetes mellitus     Eczema     Elevated fasting glucose 08/19/2014    Family history of bladder cancer     Family history of heart disease 08/19/2014    Fever blister 1968    Dont get fever blisters often since menopause    History of broken nose     Menopause 2009    JANAE on CPAP 11/15/2007    Per PSG: Rx'd CPAP, pressure 13, using small Comfort Select mask or interface of patient's choice, chin strap, and heated humidifier      Pulmonary nodule 12/01/2020    Pyloric stenosis     as a child    Reflux esophagitis 08/19/2014    Per EGD 8/9/2006      Skin disease May  2020    Palmoplantar pustilis    Tobacco abuse     Ulcer     at 14 y.o.    Urticaria        Past Surgical History:   has a past surgical history that includes Gastric restriction surgery; Bishop tooth extraction; Upper gastrointestinal endoscopy (2006); Colonoscopy (2018); Tonsillectomy; and Abdominal surgery (1958).    Family History:  family history includes Bladder Cancer in her maternal uncle; COPD in her mother and sister; Cancer in her father; Dementia in her mother, paternal uncle, and another family member; Depression in her sister; Diabetes in her father and sister; Early death in her sister; Heart disease in her father and sister; Heart failure in her sister; Hypertension in her brother, mother, and sister; Kidney disease in her mother; Macular degeneration in her mother; Mental illness in her sister; Prostate cancer in her father; Psoriasis in her mother; Stroke (age of onset: 60) in her mother; Stroke (age of onset: 75) in her father; Vision loss in her mother.     Social History:  Social History[1]    Review of Systems   Constitutional:  Negative for chills, fever and night sweats.   HENT:  Positive for hearing loss.         Only with ear infection. Not now.    Eyes:  Negative for visual disturbance.        Wears glasses   Respiratory:  Negative for cough, chest tightness, shortness of breath and wheezing.    Cardiovascular:  Negative for chest pain, palpitations and leg swelling.   Gastrointestinal:  Positive for abdominal pain. Negative for constipation, diarrhea, nausea and vomiting.        Intermittent epigastric abd pain, Flatulence. Only the day after her mounjaro.    Endocrine: Negative for cold intolerance, heat intolerance and polydipsia.   Genitourinary:  Negative for bladder incontinence, dysuria, frequency, hematuria and vaginal bleeding.   Musculoskeletal:  Negative for arthralgias and myalgias.   Neurological:  Negative for dizziness and headaches.   Psychiatric/Behavioral:  Negative for  "depressed mood. The patient is not nervous/anxious.         Medications:  Current Medications[2]     Allergies:  Review of patient's allergies indicates:   Allergen Reactions    Codeine Other (See Comments)     psychosis    Lisinopril Swelling     angioedema       Physical Exam      Vital Signs  Temp: 98.2 °F (36.8 °C)  Temp Source: Oral  Pulse: 74  Resp: 16  SpO2: 96 %  BP: 130/78  BP Location: Left arm  Patient Position: Sitting  Pain Score: 0-No pain  Height and Weight  Height: 5' 5" (165.1 cm)  Weight: 90.3 kg (199 lb 1.2 oz)  BSA (Calculated - sq m): 2.03 sq meters  BMI (Calculated): 33.1  Weight in (lb) to have BMI = 25: 149.9      Patient Position: Sitting      Physical Exam  Vitals reviewed.   Constitutional:       General: She is not in acute distress.     Appearance: Normal appearance. She is not ill-appearing, toxic-appearing or diaphoretic.   HENT:      Head: Normocephalic and atraumatic.      Right Ear: Tympanic membrane normal.      Left Ear: Tympanic membrane normal.      Mouth/Throat:      Mouth: Mucous membranes are moist.   Eyes:      Extraocular Movements: Extraocular movements intact.      Conjunctiva/sclera: Conjunctivae normal.      Pupils: Pupils are equal, round, and reactive to light.   Neck:      Vascular: No carotid bruit.   Cardiovascular:      Rate and Rhythm: Normal rate and regular rhythm.      Pulses: Normal pulses.      Heart sounds: Normal heart sounds. No murmur heard.  Pulmonary:      Effort: No respiratory distress.      Breath sounds: Normal breath sounds. No wheezing.   Abdominal:      General: Bowel sounds are normal. There is no distension.      Palpations: Abdomen is soft.      Tenderness: There is no abdominal tenderness. There is no guarding or rebound.   Skin:     General: Skin is warm.      Comments: Scattered urticaria to BUE   Neurological:      General: No focal deficit present.      Mental Status: She is oriented to person, place, and time.   Psychiatric:         " Mood and Affect: Mood normal.         Behavior: Behavior normal.          Laboratory:  CBC:  Recent Labs   Lab 06/20/22  1528 06/14/23  0900 04/28/25  0854   WBC 8.1 7.93 7.81   RBC 4.12 L 4.86 4.35   Hemoglobin 11.3 L 13.1  --    HGB  --   --  11.6 L   Hematocrit 34.4 L 41.7  --    HCT  --   --  38.2   Platelet Count  --   --  419   Platelets 349 333  --    MCV 83.6 86 88   MCH 27.6 27.0 26.7 L   MCHC 33.0 31.4 L 30.4 L       CMP:  Recent Labs   Lab 10/06/23  0918 04/05/24  0909 11/08/24  1016 04/28/25  0854   Glucose 121 H 112 H   < > 104   Calcium 9.5 9.7   < > 9.8   Albumin 3.8 3.7  --  3.7   Protein Total  --   --   --  7.8   Total Protein 7.8 7.7  --   --    Sodium 140 139   < > 142   Potassium 4.2 4.4   < > 4.3   CO2 28 26   < > 25   Chloride 104 104   < > 105   BUN 15 16   < > 15   Creatinine 0.8 0.8   < > 0.7   Alkaline Phosphatase 96 104  --   --    ALP  --   --   --  95   ALT 13 23  --  18   AST 16 19  --  17   Total Bilirubin 0.4 0.4  --   --    Bilirubin Total  --   --   --  0.3    < > = values in this interval not displayed.          LIPIDS:  Recent Labs   Lab 06/20/22  1528 06/14/23  0900 04/05/24  0909 04/28/25  0854   TSH 1.40 0.965  --  1.404   HDL 56 60 48  --    HDL Cholesterol  --   --   --  47   Cholesterol Total  --   --   --  175   Cholesterol 179 198 167  --    Triglycerides 86 101 105  --    Triglyceride  --   --   --  74   LDL Cholesterol  --  117.8 98.0 113.2   LDL Calculated 109  --   --   --    HDL/Cholesterol Ratio  --  30.3 28.7 26.9   Non-HDL Cholesterol  --  138 119  --    Non HDL Cholesterol  --   --   --  128   Total Cholesterol/HDL Ratio  --  3.3 3.5  --    Cholesterol/HDL Ratio  --   --   --  3.7       TSH:  Recent Labs   Lab 06/20/22  1528 06/14/23  0900 04/28/25  0854   TSH 1.40 0.965 1.404       A1C:  Recent Labs   Lab 06/20/22  1528 06/14/23  0900 10/06/23  0918 04/05/24  0909 08/29/24  0814 04/28/25  0854   Hemoglobin A1C 7.1 H 12.4 H 6.6 H 5.9 H 5.9 H  --    Hemoglobin A1c   --   --   --   --   --  5.8 H       Urine Microalbumin/Cr:  Recent Labs   Lab 06/22/22  1614 06/14/23  0903 04/05/24  0956 04/28/25  0854   Microalbumin/Creatinine Ratio Urine  --   --   --  13.5   Microalb/Creat Ratio 18 20.4 19.7  --        Other:   Recent Labs   Lab 04/28/25  0854   Vitamin D 70     Recent Labs   Lab 06/20/22  1528   Hepatitis C Ab NON-REACTIVE       Assessment/Plan     Lnin Price is a 65 y.o.female with:    Type 2 diabetes mellitus without complication, without long-term current use of insulin  - Controlled.  Cont current.     Hypertension associated with diabetes  - Controlled.  Cont current.     Hyperlipidemia, unspecified hyperlipidemia type    Normocytic anemia  -     Ferritin; Future; Expected date: 05/02/2025  -     Iron and TIBC; Future; Expected date: 05/02/2025  -     Folate; Future; Expected date: 05/02/2025  -     Vitamin B12; Future; Expected date: 05/02/2025    Nutritional anemia, unspecified  -     Folate; Future; Expected date: 05/02/2025  -     Vitamin B12; Future; Expected date: 05/02/2025    Aortic atherosclerosis  - Pt still declines statin.     JANAE on CPAP  - Stable.  Cont current regimen.    Chronic neck pain  -     Ambulatory referral/consult to Spine Care; Future; Expected date: 05/09/2025        -     tiZANidine (ZANAFLEX) 4 MG tablet; Take 1 tablet (4 mg total) by mouth every 8 (eight) hours as needed (muscle spasms).  Dispense: 60 tablet; Refill: 2  - Refilled Tizanidine and refer to back and spine.     Nicotine dependence, cigarettes, in remission  - CT Chest Lung Screening Low Dose; Future       Chronic conditions status updated as per HPI.  Other than changes above, cont current medications and maintain follow up with specialists.    Follow up in about 6 months (around 11/2/2025).      Erika Mcclure MD  [facility]                       [1]   Social History  Tobacco Use    Smoking status: Former     Current packs/day: 0.00     Average packs/day:  1.5 packs/day for 46.8 years (70.2 ttl pk-yrs)     Types: Cigarettes     Start date: 3/17/1973     Quit date: 2020     Years since quittin.3     Passive exposure: Past    Smokeless tobacco: Never   Substance Use Topics    Alcohol use: Yes     Comment: social - rare    Drug use: Never   [2]   Current Outpatient Medications:     acetic acid (VOSOL) 2 % otic solution, Place into both ears., Disp: , Rfl:     albuterol (PROVENTIL/VENTOLIN HFA) 90 mcg/actuation inhaler, Inhale 2 puffs into the lungs every 6 (six) hours as needed for Wheezing. Rescue, Disp: 54 g, Rfl: 3    amLODIPine (NORVASC) 5 MG tablet, Take 1 tablet (5 mg total) by mouth once daily., Disp: 90 tablet, Rfl: 0    ascorbic acid, vitamin C, (VITAMIN C) 100 MG tablet, Take 100 mg by mouth once daily., Disp: , Rfl:     blood sugar diagnostic (ONETOUCH ULTRA TEST) Strp, USE TO TEST BLOOD GLUCOSE ONCE DAILY, Disp: 100 strip, Rfl: 1    blood sugar diagnostic (TRUE METRIX GLUCOSE TEST STRIP) Strp, Use to test blood glucose two (2) times a day, to be used with insurance-preferred brand of glucometer/supplies., Disp: 200 strip, Rfl: 3    blood sugar diagnostic Strp, To check BG 2 times daily, to use with insurance preferred meter, Disp: 200 strip, Rfl: 3    cetirizine (ZYRTEC) 10 MG tablet, Take 1 tablet (10 mg total) by mouth once daily., Disp: 180 tablet, Rfl: 3    clobetasol 0.05% (TEMOVATE) 0.05 % Oint, Apply twice daily as needed for flare ups., Disp: 60 g, Rfl: 3    coenzyme Q10 (COQ-10) 100 mg capsule, Take 2 once daily, Disp: 1 capsule, Rfl: 0    diphenhydrAMINE (BENADRYL) 50 MG capsule, Take 1 capsule (50 mg total) by mouth every 6 (six) hours as needed for Itching or Allergies., Disp: 30 capsule, Rfl: 0    hydroCHLOROthiazide 12.5 MG Tab, Take 1 tablet (12.5 mg total) by mouth every morning., Disp: 90 tablet, Rfl: 0    lancets (ONETOUCH DELICA PLUS LANCET) 30 gauge Misc, TO CHECK BLOOD GLUCOSE ONCE DAILY, Disp: 100 each, Rfl: 1    lancets Misc,  To check BG 2 times daily, to use with insurance preferred meter, Disp: 200 each, Rfl: 2    metFORMIN (GLUCOPHAGE-XR) 500 MG ER 24hr tablet, TAKE 2 TABLETS BY MOUTH EVERY NIGHT AT BEDTIME, Disp: 180 tablet, Rfl: 0    multivitamin capsule, Take 1 capsule by mouth once daily., Disp: , Rfl:     ONETOUCH DELICA PLUS LANCET 30 gauge Misc, TO CHECK BLOOD GLUCOSE ONCE DAILY, Disp: , Rfl:     ONETOUCH ULTRA2 METER Misc, USE TO CHECK BLOOD GLUCOSE ONCE DAILY, Disp: , Rfl:     sulfacetamide sodium 10% (BLEPH-10) 10 % ophthalmic solution, 1 drop as needed., Disp: , Rfl:     tirzepatide (MOUNJARO) 10 mg/0.5 mL PnIj, Inject 10 mg into the skin every 7 days., Disp: 4 Pen, Rfl: 2    triamcinolone acetonide 0.1% (KENALOG) 0.1 % cream, Apply topically as needed., Disp: , Rfl:     vitamin D (VITAMIN D3) 1000 units Tab, Take 1,000 Units by mouth once daily., Disp: , Rfl:     aspirin 81 MG Chew, Take 1 tablet (81 mg total) by mouth once daily., Disp: , Rfl: 0    calcipotriene (DOVONOX) 0.005 % cream, Apply topically 2 (two) times daily. To affected areas on hands and feet., Disp: 120 g, Rfl: 3    EPINEPHrine (EPIPEN) 0.3 mg/0.3 mL AtIn, Inject 0.3 mLs (0.3 mg total) into the muscle once. for 1 dose, Disp: 2 each, Rfl: 0    tiZANidine (ZANAFLEX) 4 MG tablet, Take 1 tablet (4 mg total) by mouth every 8 (eight) hours as needed (muscle spasms)., Disp: 60 tablet, Rfl: 2

## 2025-05-07 ENCOUNTER — PATIENT MESSAGE (OUTPATIENT)
Dept: ENDOCRINOLOGY | Facility: CLINIC | Age: 65
End: 2025-05-07
Payer: MEDICARE

## 2025-05-07 ENCOUNTER — LAB VISIT (OUTPATIENT)
Dept: LAB | Facility: HOSPITAL | Age: 65
End: 2025-05-07
Attending: INTERNAL MEDICINE
Payer: MEDICARE

## 2025-05-07 DIAGNOSIS — D64.9 NORMOCYTIC ANEMIA: ICD-10-CM

## 2025-05-07 DIAGNOSIS — D53.9 NUTRITIONAL ANEMIA, UNSPECIFIED: ICD-10-CM

## 2025-05-07 LAB
FOLATE SERPL-MCNC: 8.7 NG/ML (ref 4–24)
VIT B12 SERPL-MCNC: 409 PG/ML (ref 210–950)

## 2025-05-07 PROCEDURE — 82746 ASSAY OF FOLIC ACID SERUM: CPT

## 2025-05-07 PROCEDURE — 36415 COLL VENOUS BLD VENIPUNCTURE: CPT

## 2025-05-07 PROCEDURE — 82607 VITAMIN B-12: CPT

## 2025-05-12 ENCOUNTER — RESULTS FOLLOW-UP (OUTPATIENT)
Dept: PRIMARY CARE CLINIC | Facility: CLINIC | Age: 65
End: 2025-05-12

## 2025-05-12 DIAGNOSIS — F17.211 NICOTINE DEPENDENCE, CIGARETTES, IN REMISSION: Primary | ICD-10-CM

## 2025-05-12 NOTE — PROGRESS NOTES
I sent pt a my chart message -  I reviewed your  labs.  I still await your iron studies.  Your folare was normal.  Your Vitamin B12 was borderline low at 409. You can take an OTC Vitamin B12 supplement 1000 mcg every other day or 500 mcg daily.   Dr. YEAGER

## 2025-05-15 ENCOUNTER — OFFICE VISIT (OUTPATIENT)
Dept: ENDOCRINOLOGY | Facility: CLINIC | Age: 65
End: 2025-05-15
Payer: MEDICARE

## 2025-05-15 VITALS
OXYGEN SATURATION: 99 % | HEART RATE: 66 BPM | SYSTOLIC BLOOD PRESSURE: 122 MMHG | WEIGHT: 198.88 LBS | DIASTOLIC BLOOD PRESSURE: 75 MMHG | BODY MASS INDEX: 33.09 KG/M2

## 2025-05-15 DIAGNOSIS — E66.812 CLASS 2 SEVERE OBESITY DUE TO EXCESS CALORIES WITH SERIOUS COMORBIDITY AND BODY MASS INDEX (BMI) OF 35.0 TO 35.9 IN ADULT: ICD-10-CM

## 2025-05-15 DIAGNOSIS — E11.65 TYPE 2 DIABETES MELLITUS WITH HYPERGLYCEMIA, WITHOUT LONG-TERM CURRENT USE OF INSULIN: Primary | ICD-10-CM

## 2025-05-15 DIAGNOSIS — E66.01 CLASS 2 SEVERE OBESITY DUE TO EXCESS CALORIES WITH SERIOUS COMORBIDITY AND BODY MASS INDEX (BMI) OF 35.0 TO 35.9 IN ADULT: ICD-10-CM

## 2025-05-15 DIAGNOSIS — E11.69 HYPERLIPIDEMIA ASSOCIATED WITH TYPE 2 DIABETES MELLITUS: ICD-10-CM

## 2025-05-15 DIAGNOSIS — E78.5 HYPERLIPIDEMIA ASSOCIATED WITH TYPE 2 DIABETES MELLITUS: ICD-10-CM

## 2025-05-15 PROCEDURE — 99999 PR PBB SHADOW E&M-EST. PATIENT-LVL IV: CPT | Mod: PBBFAC,,, | Performed by: INTERNAL MEDICINE

## 2025-05-15 RX ORDER — TIRZEPATIDE 10 MG/.5ML
10 INJECTION, SOLUTION SUBCUTANEOUS
Qty: 6 ML | Refills: 2 | Status: SHIPPED | OUTPATIENT
Start: 2025-05-15

## 2025-05-15 RX ORDER — METFORMIN HYDROCHLORIDE 500 MG/1
TABLET, EXTENDED RELEASE ORAL
Qty: 180 TABLET | Refills: 2 | Status: SHIPPED | OUTPATIENT
Start: 2025-05-15

## 2025-05-15 RX ORDER — PREDNISOLONE SODIUM PHOSPHATE 10 MG/ML
SOLUTION/ DROPS OPHTHALMIC
COMMUNITY
Start: 2025-04-28

## 2025-05-15 NOTE — PATIENT INSTRUCTIONS
Continue current diabetes medications.    Call if you have any side effects from the medication:   Mounjaro:  Nausea, vomiting, diarrhea, constipation, decreased appetite, abdominal pain. Other side effects include pancreatitis, gastroparesis, worsening of retinopathy, dehydration and acute kidney injury. Avoid this medication if you have any history of pancreatitis or have personal or family history of medullary thyroid cancer.      Check blood sugars before meals and bedtime.   Call if blood sugars are frequently less than 70 or above 200.    Call if you need prescriptions for medications.  Carry glucose tablets or snacks with you at all times.     Follow-up in 6 months.

## 2025-05-15 NOTE — ASSESSMENT & PLAN NOTE
Last A1c is stable at 5.8.   Continue her current diabetes regimen.  Has lost about 6 lb since her last visit.    Follow-up with the diabetes educator as needed.    Discussed goal A1c of <7%  Call if blood sugars are persistently less than  70 or greater than 200.     Discussed importance of diet and lifestyle modifications for diabetes management  Hypoglycemia management discussed. Carry glucose tablets or snacks at all times.     Complications:  Follow up for regular diabetes eye exam  Daily self examination of feet  Microalbumin: Monitor.     Lab Results   Component Value Date    HGBA1C 5.8 (H) 04/28/2025

## 2025-05-16 ENCOUNTER — OFFICE VISIT (OUTPATIENT)
Dept: PAIN MEDICINE | Facility: CLINIC | Age: 65
End: 2025-05-16
Payer: MEDICARE

## 2025-05-16 VITALS
SYSTOLIC BLOOD PRESSURE: 125 MMHG | BODY MASS INDEX: 33.5 KG/M2 | HEART RATE: 70 BPM | WEIGHT: 201.06 LBS | DIASTOLIC BLOOD PRESSURE: 78 MMHG | HEIGHT: 65 IN

## 2025-05-16 DIAGNOSIS — M47.9 OSTEOARTHRITIS OF SPINE, UNSPECIFIED SPINAL OSTEOARTHRITIS COMPLICATION STATUS, UNSPECIFIED SPINAL REGION: ICD-10-CM

## 2025-05-16 DIAGNOSIS — M54.2 CHRONIC NECK PAIN: ICD-10-CM

## 2025-05-16 DIAGNOSIS — M47.812 CERVICAL SPONDYLOSIS: Primary | ICD-10-CM

## 2025-05-16 DIAGNOSIS — G89.29 CHRONIC NECK PAIN: ICD-10-CM

## 2025-05-16 PROCEDURE — 99999 PR PBB SHADOW E&M-EST. PATIENT-LVL V: CPT | Mod: PBBFAC,,, | Performed by: EMERGENCY MEDICINE

## 2025-05-16 RX ORDER — MELOXICAM 7.5 MG/1
7.5 TABLET ORAL DAILY PRN
Qty: 30 TABLET | Refills: 0 | Status: SHIPPED | OUTPATIENT
Start: 2025-05-16 | End: 2025-06-15

## 2025-05-16 NOTE — PROGRESS NOTES
Interventional Pain Management - New Patient Visit    Chief Complaint   Patient presents with    Neck Pain        Original HPI 05/16/2025: Linn Price  presents to the clinic for the evaluation of the above pain. The pain started years . Original Pain Description: The pain is located in the neck  and is axial in nature. The pain is described as aching, burning, shooting, stabbing, and throbbing. Exacerbating factors: Standing, Bending, Walking, Extension, Flexing, and Lifting. Mitigating factors rest. Symptoms interfere with daily activity. The patient feels like symptoms have been worsening. Patient denies denies myelopathic symptoms such as handwriting changes or difficulty with buttons/coins/keys.      PAIN SCORES:  Best: Pain is 3  Current: Pain is 3  Worst: Pain is 8    6 weeks of Conservative therapy:  PT: Not yet  Chiro:  HEP: Unable due to pain    Treatments / Medications:   Naprosyn  Tylenol  Motrin  Tizanidine 4mg    Antiplatelets/Anticoagulants/Immunosuppressants:  Asa 81    Interventional Pain Procedures:   NA    IMAGING:    MRI CERVICAL SPINE WITHOUT CONTRAST 2/16/2024  C2-3: Uv and facet arthropathy->mod BL foraminal stenosis.   C3-4: Uv and facet arthropathy->mild-mod BL foraminal narrowing.   C4-5: Small central extrusion w/slight caudal migration. Uv and facet arthropathy->mild right/mod left foraminal narrowing.   C5-6: DOC flattens VTS->mild spinal canal stenosis. Uv and facet arthropathy->mod-severe BL foraminal narrowing   C6-7: DOC effaces VTS->mod spinal canal stenosis. Uv and facet arthropathy->severe left/mod-severe right foraminal narrowing.   C7-T1: DOC effaces VTS->mild spinal canal stenosis. Uv and facet arthropathy->mod right/mild left foraminal narrowing.     Past Surgical History:   Procedure Laterality Date    ABDOMINAL SURGERY  1958    infant Pyloric stenosis    COLONOSCOPY  2018    Benign Polyps     GASTRIC RESTRICTION SURGERY      pyloric stenosis as infant    TONSILLECTOMY       UPPER GASTROINTESTINAL ENDOSCOPY  2006    WISDOM TOOTH EXTRACTION         Social History     Socioeconomic History    Marital status:     Number of children: 0   Tobacco Use    Smoking status: Former     Current packs/day: 0.00     Average packs/day: 1.5 packs/day for 46.8 years (70.2 ttl pk-yrs)     Types: Cigarettes     Start date: 3/17/1973     Quit date: 2020     Years since quittin.3     Passive exposure: Past    Smokeless tobacco: Never   Substance and Sexual Activity    Alcohol use: Yes     Comment: social - rare    Drug use: Never    Sexual activity: Not Currently     Partners: Male     Birth control/protection: Post-menopausal     Comment: :          2009   Social History Narrative    The patient does not exercise regularly ().    Rates diet as good to fair.    She is not satisfied with weight.         Social Drivers of Health     Financial Resource Strain: Low Risk  (2023)    Overall Financial Resource Strain (CARDIA)     Difficulty of Paying Living Expenses: Not very hard   Food Insecurity: No Food Insecurity (2023)    Hunger Vital Sign     Worried About Running Out of Food in the Last Year: Never true     Ran Out of Food in the Last Year: Never true   Transportation Needs: No Transportation Needs (2023)    PRAPARE - Transportation     Lack of Transportation (Medical): No     Lack of Transportation (Non-Medical): No   Stress: Stress Concern Present (2023)    [employer] - Occupational Stress Questionnaire     Feeling of Stress : To some extent   Housing Stability: Unknown (2023)    Housing Stability Vital Sign     Unable to Pay for Housing in the Last Year: No     Unstable Housing in the Last Year: No       Medications/Allergies: See med card    ROS:  GENERAL: No fever. No chills. No fatigue. Denies weight loss. Denies weight gain.  Back / musculoskeletal / neuro : See HPI    VITALS:   Vitals:    25 1119   BP:  "125/78   Pulse: 70   Weight: 91.2 kg (201 lb 1 oz)   Height: 5' 5" (1.651 m)   PainSc:   3   PainLoc: Neck     Body mass index is 33.46 kg/m².      5/16/2025    11:19 AM   Last 3 PDI Scores   Pain Disability Index (PDI) 21       PHYSICAL EXAM:   GENERAL: Well appearing, in no acute distress, alert and oriented x3.  PSYCH:  Mood and affect appropriate.  SKIN: Skin color, texture, turgor normal, no rashes or lesions.  HEENT:  Normocephalic, atraumatic. Cranial nerves grossly intact.  NECK: Pain with axial loading bilateral. Negative Bland's bilaterally.   PULM: No evidence of respiratory difficulty, symmetric chest rise.  GI:  Non-distended  BACK: Normal range of motion. No pain to palpation over the spinous processes. No pain to palpation over facet joints. There is no pain with palpation over the sacroiliac joints bilaterally.   EXTREMITIES: No deformities, edema, or skin discoloration.   MUSCULOSKELETAL: Shoulder, hip, and knee provocative maneuvers are negative. No atrophy is noted.  NEURO: Sensation is equal and appropriate bilaterally. Bilateral upper and lower extremity strength is normal and symmetric. Bilateral upper and lower extremity coordination and muscle stretch reflexes are physiologic and symmetric. Plantar response are downgoing. Straight leg raising in the supine position is negative to radicular pain. Negative Hoffmans' BL  GAIT: normal.      LABS:    Lab Results   Component Value Date    HGBA1C 5.8 (H) 04/28/2025       Lab Results   Component Value Date    CREATININE 0.7 04/28/2025         ASSESSMENT: 65 y.o. year old female with pain, consistent with:    Encounter Diagnosis   Name Primary?    Chronic neck pain        DISCUSSION: Linn Price is a patient with PMHX of COPD, DM and CAD who comes to us with chronic axial neck pain secondary to spondylosis and who wishes to pursue a conservative route of therapy.      PLAN:  - I have stressed the importance of physical activity and a home " exercise plan to help with pain and improve health.  - Patient can continue with medications for now since they are providing benefits, using them appropriately, and without side effects.  - Counseled patient regarding the importance of activity modification and physical therapy.  - consider C4,5,6 MBB BL  - Medications:Start Mobic 7.5mg daily PRN with food to help with pain and inflammation.    - Therapy: Referral to Physical therapy for Cervical ROM, stretching, strengthening, and a home exercise program.  - Imaging: Reviewed available imaging with patient and answered any questions they had regarding study.  - The patient's pathophysiology was explained in detail with reference to x-rays, models, other visual aids as appropriate.   - Follow up visit: return to clinic in six weeks      I would like to thank Erika Mcclure MD for the opportunity to assist in the care of this patient. We had a very nice visit and I look forward to continuing their care. Please let me know if I can be of further assistance.     Siddharth Sim MD  05/16/2025

## 2025-06-01 ENCOUNTER — PATIENT MESSAGE (OUTPATIENT)
Dept: PRIMARY CARE CLINIC | Facility: CLINIC | Age: 65
End: 2025-06-01
Payer: MEDICARE

## 2025-06-02 ENCOUNTER — TELEPHONE (OUTPATIENT)
Dept: PRIMARY CARE CLINIC | Facility: CLINIC | Age: 65
End: 2025-06-02
Payer: MEDICARE

## 2025-06-02 ENCOUNTER — CLINICAL SUPPORT (OUTPATIENT)
Dept: REHABILITATION | Facility: HOSPITAL | Age: 65
End: 2025-06-02
Attending: EMERGENCY MEDICINE
Payer: MEDICARE

## 2025-06-02 DIAGNOSIS — R29.3 POOR POSTURE: ICD-10-CM

## 2025-06-02 DIAGNOSIS — M47.9 OSTEOARTHRITIS OF SPINE, UNSPECIFIED SPINAL OSTEOARTHRITIS COMPLICATION STATUS, UNSPECIFIED SPINAL REGION: ICD-10-CM

## 2025-06-02 DIAGNOSIS — M47.812 CERVICAL SPONDYLOSIS: ICD-10-CM

## 2025-06-02 DIAGNOSIS — R29.898 DECREASED RANGE OF MOTION OF NECK: Primary | ICD-10-CM

## 2025-06-02 PROCEDURE — 97110 THERAPEUTIC EXERCISES: CPT

## 2025-06-02 PROCEDURE — 97161 PT EVAL LOW COMPLEX 20 MIN: CPT

## 2025-06-05 ENCOUNTER — CLINICAL SUPPORT (OUTPATIENT)
Dept: REHABILITATION | Facility: HOSPITAL | Age: 65
End: 2025-06-05
Attending: EMERGENCY MEDICINE
Payer: MEDICARE

## 2025-06-05 DIAGNOSIS — R29.898 DECREASED RANGE OF MOTION OF NECK: Primary | ICD-10-CM

## 2025-06-05 DIAGNOSIS — R29.3 POOR POSTURE: ICD-10-CM

## 2025-06-05 DIAGNOSIS — E11.65 TYPE 2 DIABETES MELLITUS WITH HYPERGLYCEMIA, WITHOUT LONG-TERM CURRENT USE OF INSULIN: ICD-10-CM

## 2025-06-05 PROCEDURE — 97140 MANUAL THERAPY 1/> REGIONS: CPT

## 2025-06-05 PROCEDURE — 97110 THERAPEUTIC EXERCISES: CPT

## 2025-06-05 NOTE — PROGRESS NOTES
Outpatient Rehab    Physical Therapy Visit    Patient Name: Linn Price  MRN: [id number]  YOB: 1960  Encounter Date: 6/5/2025    Therapy Diagnosis:   Encounter Diagnoses   Name Primary?    Decreased range of motion of neck Yes    Poor posture      Physician: Siddharth Sim MD    Physician Orders: Eval and Treat  Medical Diagnosis: Cervical spondylosis  Osteoarthritis of spine, unspecified spinal osteoarthritis complication status, unspecified spinal region    Visit # / Visits Authorized:  2 / 20  Insurance Authorization Period: 6/2/2025 to 12/31/2025  Date of Evaluation: 6/6/2025  Plan of Care Certification: 6/2/2025 to 8/1/2025      PT/PTA:     Number of PTA visits since last PT visit:   Time In: 1405   Time Out: 1455  Total Time (in minutes): 50   Total Billable Time (in minutes): 50    FOTO:  Intake Score:  %  Survey Score 2:  %  Survey Score 3:  %    Precautions:       Subjective   Patient reports moderate neck pain today..  Pain reported as 5/10.      Objective          Treatment:     Therapeutic Exercise for 35 minutes    UBE 3' forward / 3' backwards   Supine chin tuck 2 x 10  Supine flexion AAROM with cane 2 x 10  Sidelying open book 2 x 10 bilateral   Cervical extension with towel 1 x 10  Wall slides with towel 2 x 10    Manual Therapy for 15 minutes    OA flexion mobilization  Cervical lateral glides grade II-III  Cervical up glides and down glides II-III    Time Entry(in minutes):  Manual Therapy Time Entry: 15  Therapeutic Exercise Time Entry: 35    Assessment & Plan   Assessment:       Patient demonstrates significant limitations in cervical mobility and upper thoracic mobility. Joint mobs performed to address stiffness which patient tolerated well. Therapeutic exercise focused on improving spinal mobility and postural strengthening. Will progress treatment as tolerated.     The patient will continue to benefit from skilled outpatient physical therapy in order to address the  deficits listed in the problem list on the initial evaluation, provide patient and family education, and maximize the patients level of independence in the home and community environments.     The patient's spiritual, cultural, and educational needs were considered, and the patient is agreeable to the plan of care and goals.         Plan:  Continue per established PT plan of care.     Goals:   Active       Long Term Goals       Long Term Goals       Start:  06/06/25    Expected End:  08/01/25       1.     Patient will demonstrate bilateral cervical rotation AROM at least 55 degrees.  2.     Patient will demonstrate bilateral shoulder strength 5/5.  3.     Pain Rating at Worst: 3/10 or less to improve overall Quality of Life.    4.     Patient will meet predicted functional outcome (FOTO) score: 70% functional ability or greater to increase self-perceived functional ability.  5.     Patient will be independent with updated HEP at discharge.               Short Term Goals       Short Term Goals       Start:  06/06/25    Expected End:  07/04/25       1.     Patient will be independent with HEP to supplement therapy in return to maximal function.  2.     Pain rating at Worst: 5/10 or less for improved tolerance with household chores.   3.     Patient will demonstrate bilateral cervical rotation AROM at least 45 degrees.                  Shanti Mora, PT

## 2025-06-06 PROBLEM — R29.3 POOR POSTURE: Status: ACTIVE | Noted: 2025-06-06

## 2025-06-06 PROBLEM — R29.898 DECREASED RANGE OF MOTION OF NECK: Status: ACTIVE | Noted: 2025-06-06

## 2025-06-06 RX ORDER — TIRZEPATIDE 10 MG/.5ML
10 INJECTION, SOLUTION SUBCUTANEOUS
Qty: 6 ML | Refills: 2 | Status: SHIPPED | OUTPATIENT
Start: 2025-06-06

## 2025-06-09 ENCOUNTER — TELEPHONE (OUTPATIENT)
Dept: PRIMARY CARE CLINIC | Facility: CLINIC | Age: 65
End: 2025-06-09
Payer: MEDICARE

## 2025-06-09 NOTE — TELEPHONE ENCOUNTER
LVM For pt To Call the office to let Dr. Mcclure know Per Ms. Shira said that she will not be able to provide service to the patient, because the patients have failed Compliance.

## 2025-06-09 NOTE — TELEPHONE ENCOUNTER
Spoke to pt in regard to 's response to not understanding what pt has been non complaint with or what dept this information is coming from. Pt stated someone from [facility] called her stating she has been non complaint with usage and benefiting from her CPAP machine. Pt stated this is not true because she uses her CPAP machine every night and does benefit from using it.     Pt added a rep from [facility] on the line. Rep stated they received a CPAP machine order signed by . Form is missing  NPI , pt start date of CPAP machine, note stating pt uses CPAP machine and is benefiting from using it and recent chart notes with documentation of CPAP usage.      Rep stated she is faxing the CPAP form back to 's office for corrections . Office fax number confirmed with rep.    [facility]   office [phone]  Fax [phone]

## 2025-06-09 NOTE — TELEPHONE ENCOUNTER
Copied from CRM #[id number]. Topic: General Inquiry - Patient Advice  >> Jun 9, 2025 10:57 AM Loli wrote:  MsTiff Shira said that she will not be able to provide service to the patient, because the patients have failed Compliance.

## 2025-06-10 ENCOUNTER — CLINICAL SUPPORT (OUTPATIENT)
Dept: REHABILITATION | Facility: HOSPITAL | Age: 65
End: 2025-06-10
Attending: EMERGENCY MEDICINE
Payer: MEDICARE

## 2025-06-10 DIAGNOSIS — R29.898 DECREASED RANGE OF MOTION OF NECK: Primary | ICD-10-CM

## 2025-06-10 DIAGNOSIS — R29.3 POOR POSTURE: ICD-10-CM

## 2025-06-10 PROCEDURE — 97140 MANUAL THERAPY 1/> REGIONS: CPT

## 2025-06-10 PROCEDURE — 97110 THERAPEUTIC EXERCISES: CPT

## 2025-06-10 NOTE — PROGRESS NOTES
Outpatient Rehab    Physical Therapy Visit    Patient Name: Linn Price  MRN: [id number]  YOB: 1960  Encounter Date: 6/10/2025    Therapy Diagnosis:   Encounter Diagnoses   Name Primary?    Decreased range of motion of neck Yes    Poor posture      Physician: Siddharth Sim MD    Physician Orders: Eval and Treat  Medical Diagnosis: Cervical spondylosis  Osteoarthritis of spine, unspecified spinal osteoarthritis complication status, unspecified spinal region    Visit # / Visits Authorized:  2 / 20  Insurance Authorization Period: 6/2/2025 to 12/31/2025  Date of Evaluation: 6/6/2025  Plan of Care Certification: 6/2/2025 to 8/1/2025      PT/PTA:     Number of PTA visits since last PT visit:   Time In: 1400   Time Out: 1445  Total Time (in minutes): 45   Total Billable Time (in minutes): 25    FOTO:  Intake Score:  %  Survey Score 2:  %  Survey Score 3:  %    Precautions:       Subjective   Patient reports continued neck pain, but states her low back is bothering her more today. States she needs to leave session 15 minutes early to get back to work..  Pain reported as 5/10.      Objective          Treatment:     Therapeutic Exercise for 30 minutes    UBE 3' forward / 3' backwards level 1.0  Supine chin tuck 2 x 10  Supine cervical rotation x 20 each side   Supine flexion AAROM with cane 2 x 10  Sidelying open book 2 x 10 bilateral NPT  Cervical extension with towel 1 x 10  Seated thoracic extension with towel roll   Wall slides with towel 2 x 10 NPT    Manual Therapy for 15 minutes    OA flexion mobilization  Cervical lateral glides grade II-III  Cervical up glides and down glides II-III    Time Entry(in minutes):  Manual Therapy Time Entry: 15  Therapeutic Exercise Time Entry: 30    Assessment & Plan   Assessment:       Patient responded well to cervical joint mobs with improvement in cervical rotation mobility noted following. Therapeutic exercise continues to focus on improving spinal mobility  and postural strengthening. Will progress treatment as tolerated.     The patient will continue to benefit from skilled outpatient physical therapy in order to address the deficits listed in the problem list on the initial evaluation, provide patient and family education, and maximize the patients level of independence in the home and community environments.     The patient's spiritual, cultural, and educational needs were considered, and the patient is agreeable to the plan of care and goals.       Plan:  Continue per established PT plan of care.     Goals:   Active       Long Term Goals       Long Term Goals       Start:  06/06/25    Expected End:  08/01/25       1.     Patient will demonstrate bilateral cervical rotation AROM at least 55 degrees.  2.     Patient will demonstrate bilateral shoulder strength 5/5.  3.     Pain Rating at Worst: 3/10 or less to improve overall Quality of Life.    4.     Patient will meet predicted functional outcome (FOTO) score: 70% functional ability or greater to increase self-perceived functional ability.  5.     Patient will be independent with updated HEP at discharge.               Short Term Goals       Short Term Goals       Start:  06/06/25    Expected End:  07/04/25       1.     Patient will be independent with HEP to supplement therapy in return to maximal function.  2.     Pain rating at Worst: 5/10 or less for improved tolerance with household chores.   3.     Patient will demonstrate bilateral cervical rotation AROM at least 45 degrees.                  Shanti Mora, PT

## 2025-06-12 ENCOUNTER — CLINICAL SUPPORT (OUTPATIENT)
Dept: REHABILITATION | Facility: HOSPITAL | Age: 65
End: 2025-06-12
Attending: EMERGENCY MEDICINE
Payer: MEDICARE

## 2025-06-12 DIAGNOSIS — R29.898 DECREASED RANGE OF MOTION OF NECK: Primary | ICD-10-CM

## 2025-06-12 DIAGNOSIS — R29.3 POOR POSTURE: ICD-10-CM

## 2025-06-12 PROCEDURE — 97140 MANUAL THERAPY 1/> REGIONS: CPT

## 2025-06-12 PROCEDURE — 97110 THERAPEUTIC EXERCISES: CPT

## 2025-06-12 NOTE — PROGRESS NOTES
Outpatient Rehab    Physical Therapy Visit    Patient Name: Linn Price  MRN: [id number]  YOB: 1960  Encounter Date: 6/12/2025    Therapy Diagnosis:   Encounter Diagnoses   Name Primary?    Decreased range of motion of neck Yes    Poor posture      Physician: Siddharth Sim MD    Physician Orders: Eval and Treat  Medical Diagnosis: Cervical spondylosis  Osteoarthritis of spine, unspecified spinal osteoarthritis complication status, unspecified spinal region    Visit # / Visits Authorized:  3 / 20  Insurance Authorization Period: 6/2/2025 to 12/31/2025  Date of Evaluation: 6/6/2025  Plan of Care Certification: 6/2/2025 to 8/1/2025      PT/PTA:     Number of PTA visits since last PT visit:   Time In: 1005   Time Out: 1100  Total Time (in minutes): 55   Total Billable Time (in minutes): 25 (1 on 1 time)    FOTO:  Intake Score:  %  Survey Score 2:  %  Survey Score 3:  %    Precautions:       Subjective   Patient reports her neck feels a little better today..  Pain reported as 4/10.      Objective          Treatment:     Therapeutic Exercise for 40 minutes    UBE 3' forward / 3' backwards level 1.0  Supine chin tuck 2 x 10  Supine cervical rotation x 20 each side   Supine flexion AAROM with cane 3 x 10  Sidelying open book 2 x 10 bilateral NPT  Cervical extension with towel 1 x 10  Seated thoracic extension with towel roll 5 sec hold 2 x 10  Wall slides with towel 2 x 10   Bilateral ER red TB 3 x 8    Manual Therapy for 15 minutes    OA flexion mobilization  Cervical lateral glides grade II-III  Cervical up glides and down glides II-III    Time Entry(in minutes):  Manual Therapy Time Entry: 15  Therapeutic Exercise Time Entry: 40    Assessment & Plan   Assessment:       Patient continues to respond well to cervical joint mobs with slight improvement in cervical rotation mobility noted following. Therapeutic exercise continues to focus on improving spinal mobility and postural strengthening. Added  bilateral ER with red TB today. Will progress treatment as tolerated.     The patient will continue to benefit from skilled outpatient physical therapy in order to address the deficits listed in the problem list on the initial evaluation, provide patient and family education, and maximize the patients level of independence in the home and community environments.     The patient's spiritual, cultural, and educational needs were considered, and the patient is agreeable to the plan of care and goals.       Plan:  Continue per established PT plan of care.     Goals:   Active       Long Term Goals       Long Term Goals       Start:  06/06/25    Expected End:  08/01/25       1.     Patient will demonstrate bilateral cervical rotation AROM at least 55 degrees.  2.     Patient will demonstrate bilateral shoulder strength 5/5.  3.     Pain Rating at Worst: 3/10 or less to improve overall Quality of Life.    4.     Patient will meet predicted functional outcome (FOTO) score: 70% functional ability or greater to increase self-perceived functional ability.  5.     Patient will be independent with updated HEP at discharge.               Short Term Goals       Short Term Goals       Start:  06/06/25    Expected End:  07/04/25       1.     Patient will be independent with HEP to supplement therapy in return to maximal function.  2.     Pain rating at Worst: 5/10 or less for improved tolerance with household chores.   3.     Patient will demonstrate bilateral cervical rotation AROM at least 45 degrees.                    Shanti Mora, PT

## 2025-06-17 ENCOUNTER — CLINICAL SUPPORT (OUTPATIENT)
Dept: REHABILITATION | Facility: HOSPITAL | Age: 65
End: 2025-06-17
Attending: EMERGENCY MEDICINE
Payer: MEDICARE

## 2025-06-17 DIAGNOSIS — R29.898 DECREASED RANGE OF MOTION OF NECK: Primary | ICD-10-CM

## 2025-06-17 DIAGNOSIS — R29.3 POOR POSTURE: ICD-10-CM

## 2025-06-17 PROCEDURE — 97140 MANUAL THERAPY 1/> REGIONS: CPT

## 2025-06-17 PROCEDURE — 97110 THERAPEUTIC EXERCISES: CPT

## 2025-06-17 RX ORDER — MELOXICAM 7.5 MG/1
7.5 TABLET ORAL DAILY
Qty: 30 TABLET | Refills: 0 | Status: SHIPPED | OUTPATIENT
Start: 2025-06-17

## 2025-06-19 ENCOUNTER — CLINICAL SUPPORT (OUTPATIENT)
Dept: REHABILITATION | Facility: HOSPITAL | Age: 65
End: 2025-06-19
Attending: EMERGENCY MEDICINE
Payer: MEDICARE

## 2025-06-19 DIAGNOSIS — R29.898 DECREASED RANGE OF MOTION OF NECK: Primary | ICD-10-CM

## 2025-06-19 DIAGNOSIS — R29.3 POOR POSTURE: ICD-10-CM

## 2025-06-19 PROCEDURE — 97110 THERAPEUTIC EXERCISES: CPT | Mod: CQ

## 2025-06-19 PROCEDURE — 97140 MANUAL THERAPY 1/> REGIONS: CPT | Mod: CQ

## 2025-06-19 NOTE — PROGRESS NOTES
Outpatient Rehab    Physical Therapy Visit    Patient Name: Linn Price  MRN: [id number]  YOB: 1960  Encounter Date: 6/19/2025    Therapy Diagnosis:   Encounter Diagnoses   Name Primary?    Decreased range of motion of neck Yes    Poor posture      Physician: Siddharth Sim MD    Physician Orders: Eval and Treat  Medical Diagnosis: Cervical spondylosis  Osteoarthritis of spine, unspecified spinal osteoarthritis complication status, unspecified spinal region  Surgical Diagnosis: Not applicable for this Episode   Surgical Date: Not applicable for this Episode  Days Since Last Surgery: Not applicable for this Episode    Visit # / Visits Authorized:  5 / 20  Insurance Authorization Period: 6/2/2025 to 12/31/2025  Date of Evaluation: 6/6/2025  Plan of Care Certification: 6/2/2025 to 8/1/2025      PT/PTA: PTA   Number of PTA visits since last PT visit:1    Time In: 1000   Time Out: 1100  Total Time (in minutes): 60   Total Billable Time (in minutes):   60     FOTO:  Intake Score:  %  Survey Score 2:  %  Survey Score 3:  %    Precautions:       Subjective   Patient reports feeling better since last treatment session..  Pain reported as 2/10.      Objective    Objective Measures updated at progress report unless specified.      Treatment:   Therapeutic Exercise for 40 minutes     UBE 3' forward / 3' backwards level 1.0  Supine chin tuck 2 x 10  Supine cervical rotation x 20 each side   Supine flexion AAROM with cane 3 x 10  Sidelying open book 2 x 10 bilateral NPT  Cervical extension with towel 1 x 10  Seated thoracic extension with towel roll 5 sec hold 2 x 10  Wall slides with towel 2 x 10   Bilateral ER red TB 3 x 8     Manual Therapy for 15 minutes     OA flexion mobilization  Cervical lateral glides grade II-III  Cervical up glides and down glides II-III     Time Entry(in minutes):  Manual Therapy Time Entry: 15  Therapeutic Exercise Time Entry: 40    Assessment & Plan   Assessment:   Patient  with tightness throughout occipital region throughout Soft Tissue Massage. Patient with decreased discomfort throughout cervical spine massage treatment. Patient able to complete therapeutic exercise without increased or reproduced pain throughout or after session, however, patient with some fatigue throughout session. Will continue to monitor and progress patient as tolerated.      The patient will continue to benefit from skilled outpatient physical therapy in order to address the deficits listed in the problem list on the initial evaluation, provide patient and family education, and maximize the patients level of independence in the home and community environments.     The patient's spiritual, cultural, and educational needs were considered, and the patient is agreeable to the plan of care and goals.       Plan:   Continue with Physical Therapist Plan of Care.     Goals:   Active       Long Term Goals       Long Term Goals       Start:  06/06/25    Expected End:  08/01/25       1.     Patient will demonstrate bilateral cervical rotation AROM at least 55 degrees.  2.     Patient will demonstrate bilateral shoulder strength 5/5.  3.     Pain Rating at Worst: 3/10 or less to improve overall Quality of Life.    4.     Patient will meet predicted functional outcome (FOTO) score: 70% functional ability or greater to increase self-perceived functional ability.  5.     Patient will be independent with updated HEP at discharge.               Short Term Goals       Short Term Goals       Start:  06/06/25    Expected End:  07/04/25       1.     Patient will be independent with HEP to supplement therapy in return to maximal function.  2.     Pain rating at Worst: 5/10 or less for improved tolerance with household chores.   3.     Patient will demonstrate bilateral cervical rotation AROM at least 45 degrees.                  Jj Thakur, PTA

## 2025-06-23 ENCOUNTER — TELEPHONE (OUTPATIENT)
Dept: PAIN MEDICINE | Facility: CLINIC | Age: 65
End: 2025-06-23
Payer: MEDICARE

## 2025-06-24 ENCOUNTER — CLINICAL SUPPORT (OUTPATIENT)
Dept: REHABILITATION | Facility: HOSPITAL | Age: 65
End: 2025-06-24
Attending: EMERGENCY MEDICINE
Payer: MEDICARE

## 2025-06-24 DIAGNOSIS — R29.898 DECREASED RANGE OF MOTION OF NECK: Primary | ICD-10-CM

## 2025-06-24 DIAGNOSIS — R29.3 POOR POSTURE: ICD-10-CM

## 2025-06-24 PROCEDURE — 97110 THERAPEUTIC EXERCISES: CPT

## 2025-06-24 PROCEDURE — 97140 MANUAL THERAPY 1/> REGIONS: CPT

## 2025-06-24 NOTE — PROGRESS NOTES
Outpatient Rehab    Physical Therapy Visit    Patient Name: Linn Price  MRN: [id number]  YOB: 1960  Encounter Date: 6/24/2025    Therapy Diagnosis:   Encounter Diagnoses   Name Primary?    Decreased range of motion of neck Yes    Poor posture      Physician: Siddharth Sim MD    Physician Orders: Eval and Treat  Medical Diagnosis: Cervical spondylosis  Osteoarthritis of spine, unspecified spinal osteoarthritis complication status, unspecified spinal region  Surgical Diagnosis: Not applicable for this Episode   Surgical Date: Not applicable for this Episode  Days Since Last Surgery: Not applicable for this Episode    Visit # / Visits Authorized: 6 / 20  Insurance Authorization Period: 6/2/2025 to 12/31/2025  Date of Evaluation: 6/6/2025  Plan of Care Certification: 6/2/2025 to 8/1/2025      PT/PTA:     Number of PTA visits since last PT visit:     Time In: 1010   Time Out: 1100  Total Time (in minutes): 50   Total Billable Time (in minutes): 25     FOTO:  Intake Score:  %  Survey Score 2:  %  Survey Score 3:  %    Precautions:       Subjective   Patient reports she is not having much pain; she continues to feel stiff..  Pain reported as 0/10.      Objective         Treatment:   Therapeutic Exercise for 30 minutes     UBE 3' forward / 3' backwards level 1.0  Supine chin tuck 2 x 10  Supine cervical rotation x 20 each side   Supine flexion AAROM with cane 3 x 10  Sidelying open book 2 x 10 bilateral NPT  Cervical extension with towel 1 x 10  Seated thoracic extension with towel roll 5 sec hold 2 x 10  Wall slides with towel 2 x 10   Bilateral ER red TB 3 x 8     Manual Therapy for 15 minutes     OA flexion mobilization  Cervical lateral glides grade II-III  Cervical up glides and down glides II-III  Seated thoracic mobs     Time Entry(in minutes):  Manual Therapy Time Entry: 15  Therapeutic Exercise Time Entry: 30    Assessment & Plan   Assessment:     Patient continues to respond well to cervical  joint mobs with slight improvement in cervical rotation mobility noted following. Therapeutic exercise continues to focus on improving spinal mobility and postural strengthening. Will progress treatment as tolerated.      The patient will continue to benefit from skilled outpatient physical therapy in order to address the deficits listed in the problem list on the initial evaluation, provide patient and family education, and maximize the patients level of independence in the home and community environments.     The patient's spiritual, cultural, and educational needs were considered, and the patient is agreeable to the plan of care and goals.       Plan:   Continue with Physical Therapist Plan of Care.     Goals:   Active       Long Term Goals       Long Term Goals       Start:  06/06/25    Expected End:  08/01/25       1.     Patient will demonstrate bilateral cervical rotation AROM at least 55 degrees.  2.     Patient will demonstrate bilateral shoulder strength 5/5.  3.     Pain Rating at Worst: 3/10 or less to improve overall Quality of Life.    4.     Patient will meet predicted functional outcome (FOTO) score: 70% functional ability or greater to increase self-perceived functional ability.  5.     Patient will be independent with updated HEP at discharge.               Short Term Goals       Short Term Goals       Start:  06/06/25    Expected End:  07/04/25       1.     Patient will be independent with HEP to supplement therapy in return to maximal function.  2.     Pain rating at Worst: 5/10 or less for improved tolerance with household chores.   3.     Patient will demonstrate bilateral cervical rotation AROM at least 45 degrees.                    Shanti Mora, PT

## 2025-06-24 NOTE — PROGRESS NOTES
Outpatient Rehab    Physical Therapy Visit    Patient Name: Linn Price  MRN: [id number]  YOB: 1960  Encounter Date: 6/24/2025    Therapy Diagnosis:   No diagnosis found.    Physician: Siddharth Sim MD    Physician Orders: Eval and Treat  Medical Diagnosis: Cervical spondylosis  Osteoarthritis of spine, unspecified spinal osteoarthritis complication status, unspecified spinal region  Surgical Diagnosis: Not applicable for this Episode   Surgical Date: Not applicable for this Episode  Days Since Last Surgery: Not applicable for this Episode    Visit # / Visits Authorized:  5 / 20  Insurance Authorization Period: 6/2/2025 to 12/31/2025  Date of Evaluation:   Plan of Care Certification:       PT/PTA:     Number of PTA visits since last PT visit:     Time In:     Time Out:    Total Time (in minutes):     Total Billable Time (in minutes):   60     FOTO:  Intake Score:  %  Survey Score 2:  %  Survey Score 3:  %    Precautions:       Subjective             Objective    Objective Measures updated at progress report unless specified.      Treatment:   Therapeutic Exercise for 40 minutes     UBE 3' forward / 3' backwards level 1.0  Supine chin tuck 2 x 10  Supine cervical rotation x 20 each side   Supine flexion AAROM with cane 3 x 10  Sidelying open book 2 x 10 bilateral NPT  Cervical extension with towel 1 x 10  Seated thoracic extension with towel roll 5 sec hold 2 x 10  Wall slides with towel 2 x 10   Bilateral ER red TB 3 x 8     Manual Therapy for 15 minutes     OA flexion mobilization  Cervical lateral glides grade II-III  Cervical up glides and down glides II-III     Time Entry(in minutes):  Manual Therapy Time Entry: 15  Therapeutic Exercise Time Entry: 40    Assessment & Plan   Assessment:   Patient with tightness throughout occipital region throughout Soft Tissue Massage. Patient with decreased discomfort throughout cervical spine massage treatment. Patient able to complete therapeutic  exercise without increased or reproduced pain throughout or after session, however, patient with some fatigue throughout session. Will continue to monitor and progress patient as tolerated.      The patient will continue to benefit from skilled outpatient physical therapy in order to address the deficits listed in the problem list on the initial evaluation, provide patient and family education, and maximize the patients level of independence in the home and community environments.     The patient's spiritual, cultural, and educational needs were considered, and the patient is agreeable to the plan of care and goals.       Plan:   Continue with Physical Therapist Plan of Care.     Goals:         Jj Thakur, PTA

## 2025-06-25 ENCOUNTER — HOSPITAL ENCOUNTER (OUTPATIENT)
Dept: RADIOLOGY | Facility: HOSPITAL | Age: 65
Discharge: HOME | End: 2025-06-25
Attending: INTERNAL MEDICINE
Payer: MEDICARE

## 2025-06-25 DIAGNOSIS — F17.211 NICOTINE DEPENDENCE, CIGARETTES, IN REMISSION: ICD-10-CM

## 2025-06-25 PROCEDURE — 71271 CT THORAX LUNG CANCER SCR C-: CPT | Mod: TC

## 2025-06-26 ENCOUNTER — CLINICAL SUPPORT (OUTPATIENT)
Dept: REHABILITATION | Facility: HOSPITAL | Age: 65
End: 2025-06-26
Attending: EMERGENCY MEDICINE
Payer: MEDICARE

## 2025-06-26 DIAGNOSIS — R29.898 DECREASED RANGE OF MOTION OF NECK: Primary | ICD-10-CM

## 2025-06-26 DIAGNOSIS — R29.3 POOR POSTURE: ICD-10-CM

## 2025-06-26 PROCEDURE — 97140 MANUAL THERAPY 1/> REGIONS: CPT

## 2025-06-26 PROCEDURE — 97110 THERAPEUTIC EXERCISES: CPT

## 2025-06-26 NOTE — PROGRESS NOTES
Outpatient Rehab    Physical Therapy Visit    Patient Name: Linn Price  MRN: [id number]  YOB: 1960  Encounter Date: 6/26/2025    Therapy Diagnosis:   Encounter Diagnoses   Name Primary?    Decreased range of motion of neck Yes    Poor posture      Physician: Siddharth Sim MD    Physician Orders: Eval and Treat  Medical Diagnosis: Cervical spondylosis  Osteoarthritis of spine, unspecified spinal osteoarthritis complication status, unspecified spinal region  Surgical Diagnosis: Not applicable for this Episode   Surgical Date: Not applicable for this Episode  Days Since Last Surgery: Not applicable for this Episode    Visit # / Visits Authorized: 7 / 20  Insurance Authorization Period: 6/2/2025 to 12/31/2025  Date of Evaluation: 6/6/2025  Plan of Care Certification: 6/2/2025 to 8/1/2025      PT/PTA:     Number of PTA visits since last PT visit:     Time In: 1005   Time Out: 1055  Total Time (in minutes): 50   Total Billable Time (in minutes): 25     FOTO:  Intake Score:  %  Survey Score 2:  %  Survey Score 3:  %    Precautions:       Subjective   Patient reports she is not having any pain; she continues to feel stiffness..  Pain reported as 0/10.      Objective         Treatment:   Therapeutic Exercise for 35 minutes     UBE 3' forward / 3' backwards level 1.0  Supine chin tuck 2 x 10  Supine cervical rotation x 20 each side   Supine flexion AAROM with cane 3 x 10  Sidelying open book 2 x 10 bilateral NPT  Cervical extension with towel 1 x 10  Seated thoracic extension with towel roll 5 sec hold 2 x 10  Wall slides with towel 2 x 10   Bilateral ER red TB 3 x 10     Manual Therapy for 15 minutes     OA flexion mobilization  Cervical lateral glides grade II-III  Cervical up glides and down glides II-III  Seated thoracic mobs     Time Entry(in minutes):  Manual Therapy Time Entry: 15  Therapeutic Exercise Time Entry: 35    Assessment & Plan   Assessment:     Patient continues to demonstrate  significant cervical stiffness bilaterally and limitations in cervical ROM. She continues to tolerate cervical and thoracic joint mobilizations well. Therapeutic exercise continues to focus on improving spinal mobility and postural strengthening. Will progress treatment as tolerated.      The patient will continue to benefit from skilled outpatient physical therapy in order to address the deficits listed in the problem list on the initial evaluation, provide patient and family education, and maximize the patients level of independence in the home and community environments.     The patient's spiritual, cultural, and educational needs were considered, and the patient is agreeable to the plan of care and goals.       Plan:   Continue with Physical Therapist Plan of Care.     Goals:   Active       Long Term Goals       Long Term Goals       Start:  06/06/25    Expected End:  08/01/25       1.     Patient will demonstrate bilateral cervical rotation AROM at least 55 degrees.  2.     Patient will demonstrate bilateral shoulder strength 5/5.  3.     Pain Rating at Worst: 3/10 or less to improve overall Quality of Life.    4.     Patient will meet predicted functional outcome (FOTO) score: 70% functional ability or greater to increase self-perceived functional ability.  5.     Patient will be independent with updated HEP at discharge.               Short Term Goals       Short Term Goals       Start:  06/06/25    Expected End:  07/04/25       1.     Patient will be independent with HEP to supplement therapy in return to maximal function.  2.     Pain rating at Worst: 5/10 or less for improved tolerance with household chores.   3.     Patient will demonstrate bilateral cervical rotation AROM at least 45 degrees.                      Shanti Mora, PT

## 2025-06-27 ENCOUNTER — PATIENT MESSAGE (OUTPATIENT)
Dept: PRIMARY CARE CLINIC | Facility: CLINIC | Age: 65
End: 2025-06-27
Payer: MEDICARE

## 2025-06-27 DIAGNOSIS — G47.33 OSA ON CPAP: Primary | ICD-10-CM

## 2025-06-27 NOTE — PROGRESS NOTES
Outpatient Rehab    Physical Therapy Visit    Patient Name: Linn Price  MRN: [id number]  YOB: 1960  Encounter Date: 6/17/2025    Therapy Diagnosis:   Encounter Diagnoses   Name Primary?    Decreased range of motion of neck Yes    Poor posture      Physician: Siddharth Sim MD    Physician Orders: Eval and Treat  Medical Diagnosis: Cervical spondylosis  Osteoarthritis of spine, unspecified spinal osteoarthritis complication status, unspecified spinal region  Surgical Diagnosis: Not applicable for this Episode   Surgical Date: Not applicable for this Episode  Days Since Last Surgery: Not applicable for this Episode    Visit # / Visits Authorized:  7 / 20  Insurance Authorization Period: 6/2/2025 to 12/31/2025  Date of Evaluation: 6/6/2025  Plan of Care Certification: 6/2/2025 to 8/1/2025      PT/PTA: PT   Number of PTA visits since last PT visit:0  Time In: 1000   Time Out: 1100  Total Time (in minutes): 60   Total Billable Time (in minutes):      FOTO:  Intake Score:  %  Survey Score 2:  %  Survey Score 3:  %    Precautions:       Subjective   Patient reports she is not having any pain; she continues to feel stiffness..  Pain reported as 0/10.      Objective            Treatment:     Therapeutic Exercise for 40 minutes     UBE 3' forward / 3' backwards   Supine chin tuck 2 x 10  Supine flexion AAROM with cane 2 x 10  Sidelying open book 2 x 10 bilateral   Cervical extension with towel 1 x 10  Wall slides with towel 2 x 10     Manual Therapy for 20 minutes     OA flexion mobilization  Cervical lateral glides grade II-III  Cervical up glides and down glides II-III     Time Entry(in minutes):  Manual Therapy Time Entry: 15  Therapeutic Exercise Time Entry: 35       Time Entry(in minutes):       Assessment & Plan   Assessment:    Evaluation/Treatment Tolerance: Patient tolerated treatment well    The patient will continue to benefit from skilled outpatient physical therapy in order to address the  deficits listed in the problem list on the initial evaluation, provide patient and family education, and maximize the patients level of independence in the home and community environments.     The patient's spiritual, cultural, and educational needs were considered, and the patient is agreeable to the plan of care and goals.           Plan:      Goals:   Active       Long Term Goals       Long Term Goals (Progressing)       Start:  06/06/25    Expected End:  08/01/25       1.     Patient will demonstrate bilateral cervical rotation AROM at least 55 degrees.  2.     Patient will demonstrate bilateral shoulder strength 5/5.  3.     Pain Rating at Worst: 3/10 or less to improve overall Quality of Life.    4.     Patient will meet predicted functional outcome (FOTO) score: 70% functional ability or greater to increase self-perceived functional ability.  5.     Patient will be independent with updated HEP at discharge.               Short Term Goals       Short Term Goals (Progressing)       Start:  06/06/25    Expected End:  07/04/25       1.     Patient will be independent with HEP to supplement therapy in return to maximal function.  2.     Pain rating at Worst: 5/10 or less for improved tolerance with household chores.   3.     Patient will demonstrate bilateral cervical rotation AROM at least 45 degrees.                  Rogelio Duque, PT

## 2025-06-27 NOTE — TELEPHONE ENCOUNTER
LOV with Erika Mcclure MD , 5/2/2025  Pt is requesting an order for a new APAP to be faxed to [facility] along with her most recent chart note.   I verified settings with last order in media

## 2025-06-30 ENCOUNTER — OFFICE VISIT (OUTPATIENT)
Dept: PAIN MEDICINE | Facility: CLINIC | Age: 65
End: 2025-06-30
Payer: MEDICARE

## 2025-06-30 DIAGNOSIS — M54.2 CHRONIC NECK PAIN: ICD-10-CM

## 2025-06-30 DIAGNOSIS — G89.29 CHRONIC NECK PAIN: ICD-10-CM

## 2025-06-30 DIAGNOSIS — M47.812 CERVICAL SPONDYLOSIS: Primary | ICD-10-CM

## 2025-06-30 PROCEDURE — 99213 OFFICE O/P EST LOW 20 MIN: CPT | Mod: S$GLB,,, | Performed by: EMERGENCY MEDICINE

## 2025-06-30 PROCEDURE — 99999 PR PBB SHADOW E&M-EST. PATIENT-LVL II: CPT | Mod: PBBFAC,,, | Performed by: EMERGENCY MEDICINE

## 2025-06-30 PROCEDURE — 3288F FALL RISK ASSESSMENT DOCD: CPT | Mod: CPTII,S$GLB,, | Performed by: EMERGENCY MEDICINE

## 2025-06-30 PROCEDURE — 3044F HG A1C LEVEL LT 7.0%: CPT | Mod: CPTII,S$GLB,, | Performed by: EMERGENCY MEDICINE

## 2025-06-30 PROCEDURE — 3066F NEPHROPATHY DOC TX: CPT | Mod: CPTII,S$GLB,, | Performed by: EMERGENCY MEDICINE

## 2025-06-30 PROCEDURE — 3061F NEG MICROALBUMINURIA REV: CPT | Mod: CPTII,S$GLB,, | Performed by: EMERGENCY MEDICINE

## 2025-06-30 PROCEDURE — 1101F PT FALLS ASSESS-DOCD LE1/YR: CPT | Mod: CPTII,S$GLB,, | Performed by: EMERGENCY MEDICINE

## 2025-06-30 PROCEDURE — 1159F MED LIST DOCD IN RCRD: CPT | Mod: CPTII,S$GLB,, | Performed by: EMERGENCY MEDICINE

## 2025-06-30 PROCEDURE — 1125F AMNT PAIN NOTED PAIN PRSNT: CPT | Mod: CPTII,S$GLB,, | Performed by: EMERGENCY MEDICINE

## 2025-06-30 NOTE — PROGRESS NOTES
Interventional Pain Management - Established     CC: Neck pain    Interval History 06/30/2025:  Since their last visit the pain has been improving. Her average pain is 3/10. Patient was started on Meloxicam during their last visit and they have not been taking it.  They are participating in physical therapy and are participating in home exercise plan.    Original HPI 05/16/2025: Linn Price  presents to the clinic for the evaluation of the above pain. The pain started years . Original Pain Description: The pain is located in the neck  and is axial in nature. The pain is described as aching, burning, shooting, stabbing, and throbbing. Exacerbating factors: Standing, Bending, Walking, Extension, Flexing, and Lifting. Mitigating factors rest. Symptoms interfere with daily activity. The patient feels like symptoms have been worsening. Patient denies denies myelopathic symptoms such as handwriting changes or difficulty with buttons/coins/keys.      PAIN SCORES:  Best: Pain is 3  Current: Pain is 3  Worst: Pain is 8    6 weeks of Conservative therapy:  PT: Not yet  Chiro:  HEP: Unable due to pain    Treatments / Medications:   Naprosyn  Tylenol  Motrin  Tizanidine 4mg    Antiplatelets/Anticoagulants/Immunosuppressants:  Asa 81    Interventional Pain Procedures:   NA    IMAGING:    MRI CERVICAL SPINE WITHOUT CONTRAST 2/16/2024  C2-3: Uv and facet arthropathy->mod BL foraminal stenosis.   C3-4: Uv and facet arthropathy->mild-mod BL foraminal narrowing.   C4-5: Small central extrusion w/slight caudal migration. Uv and facet arthropathy->mild right/mod left foraminal narrowing.   C5-6: DOC flattens VTS->mild spinal canal stenosis. Uv and facet arthropathy->mod-severe BL foraminal narrowing   C6-7: DOC effaces VTS->mod spinal canal stenosis. Uv and facet arthropathy->severe left/mod-severe right foraminal narrowing.   C7-T1: DOC effaces VTS->mild spinal canal stenosis. Uv and facet  "arthropathy->mod right/mild left foraminal narrowing.     Medications/Allergies: See med card    ROS:  GENERAL: No fever. No chills. No fatigue. Denies weight loss. Denies weight gain.  Back / musculoskeletal / neuro : See HPI    VITALS:   Vitals:    06/30/25 1122   BP: (P) 127/73   Pulse: (P) 69   Weight: (P) 89.4 kg (197 lb 1.5 oz)   Height: (P) 5' 5" (1.651 m)   PainSc:   3   PainLoc: Neck     Body mass index is 32.8 kg/m² (pended).      6/30/2025    11:34 AM 5/16/2025    11:19 AM   Last 3 PDI Scores   Pain Disability Index (PDI) 21 21       PHYSICAL EXAM:   GENERAL: Well appearing, in no acute distress, alert and oriented x3.  PSYCH:  Mood and affect appropriate.  SKIN: Skin color, texture, turgor normal, no rashes or lesions.  HEENT:  Normocephalic, atraumatic. Cranial nerves grossly intact.  NECK: Pain with axial loading bilateral. Negative Bland's bilaterally.   PULM: No evidence of respiratory difficulty, symmetric chest rise.  GI:  Non-distended  BACK: Normal range of motion. No pain to palpation over the spinous processes. No pain to palpation over facet joints. There is no pain with palpation over the sacroiliac joints bilaterally.   EXTREMITIES: No deformities, edema, or skin discoloration.   MUSCULOSKELETAL: Shoulder, hip, and knee provocative maneuvers are negative. No atrophy is noted.  NEURO: Sensation is equal and appropriate bilaterally. Bilateral upper and lower extremity strength is normal and symmetric. Bilateral upper and lower extremity coordination and muscle stretch reflexes are physiologic and symmetric. Plantar response are downgoing. Straight leg raising in the supine position is negative to radicular pain. Negative Hoffmans' BL  GAIT: normal.      LABS:    Lab Results   Component Value Date    HGBA1C 5.8 (H) 04/28/2025       Lab Results   Component Value Date    CREATININE 0.7 04/28/2025     ASSESSMENT: 65 y.o. year old female with pain, consistent with:    Encounter Diagnoses   Name " Primary?    Cervical spondylosis Yes    Chronic neck pain      DISCUSSION: Linn Price is a patient with PMHX of COPD, DM and CAD who comes to us with chronic axial neck pain secondary to spondylosis and she is improving and wishes to continue with conservative route of therapy.    PLAN:  - I have stressed the importance of physical activity and a home exercise plan to help with pain and improve health.  - Patient can continue with medications for now since they are providing benefits, using them appropriately, and without side effects.  - Counseled patient regarding the importance of activity modification and physical therapy.  - Consider C4,5,6 MBB BL  - Imaging: Reviewed available imaging with patient and answered any questions they had regarding study.  - The patient's pathophysiology was explained in detail with reference to x-rays, models, other visual aids as appropriate.   - Follow up visit: return to clinic MARISSA Sim MD  06/30/2025

## 2025-06-30 NOTE — PROGRESS NOTES
I sent pt a my chart message -  I reviewed your  CT lung cancer screening -   It showed pulmonary nodules. The largest nodule in the right major fissure appears solid and measures 6.6 x 6.0 mm. Additional right upper lobe sub 6 mm nodules unchanged. The largest nodule in the left lower lobe appears solid and measures 5 x 5 mm.   Mild atheromatous disease is seen in the aorta. The coronary arteries show mild atheromatous disease. Diffuse anterior bridging osteophytes throughout the thoracic spine compatible with DISH (Diffuse Idiopathic Skeletal Hyperostosis - a type of arthritis that cause  abnormal bone growth/bone spurs along the spine) Benign Appearance or Behavior - continue annual screening again in 12 month   Dr. YEAGER

## 2025-07-01 ENCOUNTER — CLINICAL SUPPORT (OUTPATIENT)
Dept: REHABILITATION | Facility: HOSPITAL | Age: 65
End: 2025-07-01
Attending: EMERGENCY MEDICINE
Payer: MEDICARE

## 2025-07-01 DIAGNOSIS — R29.898 DECREASED RANGE OF MOTION OF NECK: Primary | ICD-10-CM

## 2025-07-01 DIAGNOSIS — R29.3 POOR POSTURE: ICD-10-CM

## 2025-07-01 PROCEDURE — 97140 MANUAL THERAPY 1/> REGIONS: CPT

## 2025-07-01 PROCEDURE — 97110 THERAPEUTIC EXERCISES: CPT

## 2025-07-02 NOTE — PROGRESS NOTES
Outpatient Rehab    Physical Therapy Visit    Patient Name: Linn Price  MRN: [id number]  YOB: 1960  Encounter Date: 7/1/2025    Therapy Diagnosis:   Encounter Diagnoses   Name Primary?    Decreased range of motion of neck Yes    Poor posture      Physician: Siddharth Sim MD    Physician Orders: Eval and Treat  Medical Diagnosis: Cervical spondylosis  Osteoarthritis of spine, unspecified spinal osteoarthritis complication status, unspecified spinal region  Surgical Diagnosis: Not applicable for this Episode   Surgical Date: Not applicable for this Episode  Days Since Last Surgery: Not applicable for this Episode    Visit # / Visits Authorized: 8 / 20  Insurance Authorization Period: 6/2/2025 to 12/31/2025  Date of Evaluation: 6/6/2025  Plan of Care Certification: 6/2/2025 to 8/1/2025      PT/PTA:     Number of PTA visits since last PT visit:     Time In: 1000   Time Out: 1045  Total Time (in minutes): 45   Total Billable Time (in minutes): 45     FOTO:  Intake Score:  %  Survey Score 2:  %  Survey Score 3:  %    Precautions:       Subjective   Patient reports she is not having any pain; she continues to feel stiffness. she needs to leave session 15 minutes early today.  Pain reported as 0/10.      Objective         Treatment:   Therapeutic Exercise for 30 minutes     UBE 3' forward / 3' backwards level 1.0  Supine chin tuck 2 x 10  Supine cervical rotation x 20 each side   Supine flexion AAROM with cane 3 x 10  Sidelying open book 2 x 10 bilateral NPT  Cervical extension with towel 1 x 10  Seated thoracic extension with towel roll 5 sec hold 2 x 10  Wall slides with towel 2 x 10   Bilateral ER red TB 3 x 10     Manual Therapy for 15 minutes     OA flexion mobilization  Cervical lateral glides grade II-III  Cervical up glides and down glides II-III  Seated thoracic mobs     Time Entry(in minutes):  Manual Therapy Time Entry: 15  Therapeutic Exercise Time Entry: 35    Assessment & Plan    Assessment:     Patient continues to demonstrate significant cervical stiffness bilaterally and limitations in cervical ROM. She continues to tolerate cervical and thoracic joint mobilizations well with decreased stiffness/tightness reported following. Therapeutic exercise continues to focus on improving spinal mobility and postural strengthening. Will progress treatment as tolerated.      The patient will continue to benefit from skilled outpatient physical therapy in order to address the deficits listed in the problem list on the initial evaluation, provide patient and family education, and maximize the patients level of independence in the home and community environments.     The patient's spiritual, cultural, and educational needs were considered, and the patient is agreeable to the plan of care and goals.       Plan:   Continue with Physical Therapist Plan of Care.     Goals:   Active       Long Term Goals       Long Term Goals (Progressing)       Start:  06/06/25    Expected End:  08/01/25       1.     Patient will demonstrate bilateral cervical rotation AROM at least 55 degrees.  2.     Patient will demonstrate bilateral shoulder strength 5/5.  3.     Pain Rating at Worst: 3/10 or less to improve overall Quality of Life.    4.     Patient will meet predicted functional outcome (FOTO) score: 70% functional ability or greater to increase self-perceived functional ability.  5.     Patient will be independent with updated HEP at discharge.               Short Term Goals       Short Term Goals (Progressing)       Start:  06/06/25    Expected End:  07/04/25       1.     Patient will be independent with HEP to supplement therapy in return to maximal function.  2.     Pain rating at Worst: 5/10 or less for improved tolerance with household chores.   3.     Patient will demonstrate bilateral cervical rotation AROM at least 45 degrees.                  Shanti Mora, PT

## 2025-07-10 ENCOUNTER — CLINICAL SUPPORT (OUTPATIENT)
Dept: REHABILITATION | Facility: HOSPITAL | Age: 65
End: 2025-07-10
Attending: EMERGENCY MEDICINE
Payer: MEDICARE

## 2025-07-10 DIAGNOSIS — R29.898 DECREASED RANGE OF MOTION OF NECK: Primary | ICD-10-CM

## 2025-07-10 DIAGNOSIS — R29.3 POOR POSTURE: ICD-10-CM

## 2025-07-10 PROCEDURE — 97110 THERAPEUTIC EXERCISES: CPT

## 2025-07-10 PROCEDURE — 97140 MANUAL THERAPY 1/> REGIONS: CPT

## 2025-07-10 NOTE — PROGRESS NOTES
Outpatient Rehab    Physical Therapy Progress Note    Patient Name: Linn Price  MRN: [id number]  YOB: 1960  Encounter Date: 7/10/2025    Therapy Diagnosis:   Encounter Diagnoses   Name Primary?    Decreased range of motion of neck Yes    Poor posture      Physician: Siddharth Sim MD    Physician Orders: Eval and Treat  Medical Diagnosis: Cervical spondylosis  Osteoarthritis of spine, unspecified spinal osteoarthritis complication status, unspecified spinal region  Surgical Diagnosis: Not applicable for this Episode   Surgical Date: Not applicable for this Episode  Days Since Last Surgery: Not applicable for this Episode    Visit # / Visits Authorized:  9 / 20  Insurance Authorization Period: 6/2/2025 to 12/31/2025  Date of Evaluation: 6/6/2025  Plan of Care Certification: 6/2/2025 to 8/1/2025      PT/PTA:     Number of PTA visits since last PT visit:   Time In: 1000   Time Out: 1045  Total Time (in minutes): 45   Total Billable Time (in minutes): 45    FOTO:  Intake Score:  %  Survey Score 2:  %  Survey Score 3:  %    Precautions:       Subjective   Patient reports she continues to feel stiffness. she needs to leave session 15 minutes early again today.  Pain reported as 0/10.      Objective          Cervical Range of Motion:     Degrees  Pain   Flexion 30        Extension 35        Right Rotation 48        Left Rotation 42        Right Side Bending 10     Left Side Bending 10        Treatment:     Therapeutic Exercise for 30 minutes     UBE 3' forward / 3' backwards level 1.0  Supine chin tuck 2 x 10  Supine cervical rotation x 20 each side   Supine flexion AAROM with cane 3 x 10  Sidelying open book 2 x 10 bilateral NPT  Cervical extension with towel 1 x 10  Seated thoracic extension with towel roll 5 sec hold 2 x 10  Wall slides with towel 2 x 10   Bilateral ER red TB 3 x 10     Manual Therapy for 15 minutes     OA flexion mobilization  Cervical lateral glides grade II-III  Cervical up  glides and down glides II-III  Seated thoracic mobs    Time Entry(in minutes):       Assessment & Plan   Assessment:     Re-assessment performed today and patient demonstrates some improvement in cervical ROM, but still has significant limitations into cervical rotation bilaterally and cervical extension. She continues to respond well to joint mobs and therapeutic exercises. Will progress treatment as tolerated     The patient will continue to benefit from skilled outpatient physical therapy in order to address the deficits listed in the problem list on the initial evaluation, provide patient and family education, and maximize the patients level of independence in the home and community environments.     The patient's spiritual, cultural, and educational needs were considered, and the patient is agreeable to the plan of care and goals.         Plan:  Continue per established PT plan of care.     Goals:   Active       Long Term Goals       Long Term Goals (Progressing)       Start:  06/06/25    Expected End:  08/01/25       1.     Patient will demonstrate bilateral cervical rotation AROM at least 55 degrees.  2.     Patient will demonstrate bilateral shoulder strength 5/5.  3.     Pain Rating at Worst: 3/10 or less to improve overall Quality of Life.    4.     Patient will meet predicted functional outcome (FOTO) score: 70% functional ability or greater to increase self-perceived functional ability.  5.     Patient will be independent with updated HEP at discharge.               Short Term Goals       Short Term Goals (Progressing)       Start:  06/06/25    Expected End:  07/04/25       1.     Patient will be independent with HEP to supplement therapy in return to maximal function.  2.     Pain rating at Worst: 5/10 or less for improved tolerance with household chores.   3.     Patient will demonstrate bilateral cervical rotation AROM at least 45 degrees.                Shanti Mora, PT

## 2025-07-15 ENCOUNTER — CLINICAL SUPPORT (OUTPATIENT)
Dept: REHABILITATION | Facility: HOSPITAL | Age: 65
End: 2025-07-15
Attending: EMERGENCY MEDICINE
Payer: MEDICARE

## 2025-07-15 DIAGNOSIS — R29.3 POOR POSTURE: ICD-10-CM

## 2025-07-15 DIAGNOSIS — R29.898 DECREASED RANGE OF MOTION OF NECK: Primary | ICD-10-CM

## 2025-07-15 PROCEDURE — 97110 THERAPEUTIC EXERCISES: CPT

## 2025-07-15 PROCEDURE — 97140 MANUAL THERAPY 1/> REGIONS: CPT

## 2025-07-15 NOTE — PROGRESS NOTES
Outpatient Rehab    Physical Therapy Progress Note    Patient Name: Linn Price  MRN: [id number]  YOB: 1960  Encounter Date: 7/15/2025    Therapy Diagnosis:   Encounter Diagnoses   Name Primary?    Decreased range of motion of neck Yes    Poor posture      Physician: Siddharth Sim MD    Physician Orders: Eval and Treat  Medical Diagnosis: Cervical spondylosis  Osteoarthritis of spine, unspecified spinal osteoarthritis complication status, unspecified spinal region  Surgical Diagnosis: Not applicable for this Episode   Surgical Date: Not applicable for this Episode  Days Since Last Surgery: Not applicable for this Episode    Visit # / Visits Authorized:  10 / 20  Insurance Authorization Period: 6/2/2025 to 12/31/2025  Date of Evaluation: 6/6/2025  Plan of Care Certification: 6/2/2025 to 8/1/2025      PT/PTA:     Number of PTA visits since last PT visit:   Time In: 1000   Time Out: 1045  Total Time (in minutes): 45   Total Billable Time (in minutes): 45    FOTO:  Intake Score:  %  Survey Score 2:  %  Survey Score 3:  %    Precautions:       Subjective   Patient reports she feels that her mobility has improved some. She is able to turn her head more when driving..  Pain reported as 0/10.      Objective          Cervical Range of Motion:     Degrees  Pain   Flexion 30        Extension 35        Right Rotation 48        Left Rotation 42        Right Side Bending 10     Left Side Bending 10        Treatment:     Therapeutic Exercise for 30 minutes     UBE 3' forward / 3' backwards level 1.0  Supine chin tuck 5 sec hold 2 x 10  Supine cervical rotation x 20 each side   Supine flexion AAROM with cane 3 x 10 NPT  Standing open book x 15 each side  Cervical extension with towel 1 x 10  Seated thoracic extension with towel roll 5 sec hold 2 x 10 NPT  Wall slides with towel 3 x 10   Bilateral ER red TB 3 x 10     Manual Therapy for 15 minutes     OA flexion mobilization  Cervical lateral glides grade  II-III  Cervical up glides and down glides II-III  Seated thoracic mobs    Time Entry(in minutes):  Manual Therapy Time Entry: 15  Therapeutic Exercise Time Entry: 30    Assessment & Plan   Assessment:     Patient continues to respond well to cervical and thoracic joint mobs. She tolerated therapeutic exercises well with increased reps for wall slides and bilateral ER today. Will continue to progress as tolerated     The patient will continue to benefit from skilled outpatient physical therapy in order to address the deficits listed in the problem list on the initial evaluation, provide patient and family education, and maximize the patients level of independence in the home and community environments.     The patient's spiritual, cultural, and educational needs were considered, and the patient is agreeable to the plan of care and goals.       Plan:  Continue per established PT plan of care.     Goals:   Active       Long Term Goals       Long Term Goals (Progressing)       Start:  06/06/25    Expected End:  08/01/25       1.     Patient will demonstrate bilateral cervical rotation AROM at least 55 degrees.  2.     Patient will demonstrate bilateral shoulder strength 5/5.  3.     Pain Rating at Worst: 3/10 or less to improve overall Quality of Life.    4.     Patient will meet predicted functional outcome (FOTO) score: 70% functional ability or greater to increase self-perceived functional ability.  5.     Patient will be independent with updated HEP at discharge.               Short Term Goals       Short Term Goals (Progressing)       Start:  06/06/25    Expected End:  07/04/25       1.     Patient will be independent with HEP to supplement therapy in return to maximal function.  2.     Pain rating at Worst: 5/10 or less for improved tolerance with household chores.   3.     Patient will demonstrate bilateral cervical rotation AROM at least 45 degrees.                  Shanti Mora, PT

## 2025-07-17 DIAGNOSIS — E11.65 TYPE 2 DIABETES MELLITUS WITH HYPERGLYCEMIA, WITHOUT LONG-TERM CURRENT USE OF INSULIN: ICD-10-CM

## 2025-07-17 RX ORDER — METFORMIN HYDROCHLORIDE 500 MG/1
1000 TABLET, EXTENDED RELEASE ORAL NIGHTLY
Qty: 180 TABLET | Refills: 1 | Status: SHIPPED | OUTPATIENT
Start: 2025-07-17

## 2025-07-17 NOTE — TELEPHONE ENCOUNTER
Refill Decision Note   Linn Price  is requesting a refill authorization.  Brief Assessment and Rationale for Refill:  Approve     Medication Therapy Plan:         Comments:     Note composed:3:33 PM 07/17/2025

## 2025-07-17 NOTE — TELEPHONE ENCOUNTER
No care due was identified.  [facility] Embedded Care Due Messages. Reference number: [id number].   7/17/2025 3:48:31 AM CDT

## 2025-07-18 DIAGNOSIS — E11.9 TYPE 2 DIABETES MELLITUS WITHOUT COMPLICATION, WITHOUT LONG-TERM CURRENT USE OF INSULIN: ICD-10-CM

## 2025-07-18 DIAGNOSIS — E11.65 TYPE 2 DIABETES MELLITUS WITH HYPERGLYCEMIA, WITHOUT LONG-TERM CURRENT USE OF INSULIN: Primary | ICD-10-CM

## 2025-07-18 RX ORDER — LANCETS
EACH MISCELLANEOUS
Qty: 200 EACH | Refills: 3 | Status: SHIPPED | OUTPATIENT
Start: 2025-07-18

## 2025-07-18 RX ORDER — DEXTROSE 4 G
TABLET,CHEWABLE ORAL
Qty: 1 EACH | Refills: 0 | Status: SHIPPED | OUTPATIENT
Start: 2025-07-18

## 2025-07-18 NOTE — TELEPHONE ENCOUNTER
Please sign this. I have pended the required diabetic supplies for her insurance. Left a voicemail for the patient explaining the changes.

## 2025-07-18 NOTE — TELEPHONE ENCOUNTER
No care due was identified.  [facility] Embedded Care Due Messages. Reference number: [id number].   7/18/2025 10:08:25 AM CDT

## 2025-07-22 DIAGNOSIS — I15.2 HYPERTENSION ASSOCIATED WITH DIABETES: ICD-10-CM

## 2025-07-22 DIAGNOSIS — E11.59 HYPERTENSION ASSOCIATED WITH DIABETES: ICD-10-CM

## 2025-07-22 RX ORDER — HYDROCHLOROTHIAZIDE 12.5 MG/1
12.5 TABLET ORAL EVERY MORNING
Qty: 90 TABLET | Refills: 3 | Status: SHIPPED | OUTPATIENT
Start: 2025-07-22

## 2025-07-22 NOTE — TELEPHONE ENCOUNTER
Refill Decision Note   Linn Price  is requesting a refill authorization.  Brief Assessment and Rationale for Refill:  Approve     Medication Therapy Plan:        Comments:     Note composed:2:06 PM 07/22/2025

## 2025-07-22 NOTE — TELEPHONE ENCOUNTER
No care due was identified.  [facility] Embedded Care Due Messages. Reference number: [id number].   7/22/2025 8:07:28 AM CDT

## 2025-07-23 ENCOUNTER — CLINICAL SUPPORT (OUTPATIENT)
Dept: REHABILITATION | Facility: HOSPITAL | Age: 65
End: 2025-07-23
Attending: EMERGENCY MEDICINE
Payer: MEDICARE

## 2025-07-23 DIAGNOSIS — R29.3 POOR POSTURE: ICD-10-CM

## 2025-07-23 DIAGNOSIS — R29.898 DECREASED RANGE OF MOTION OF NECK: Primary | ICD-10-CM

## 2025-07-23 PROCEDURE — 97140 MANUAL THERAPY 1/> REGIONS: CPT

## 2025-07-23 PROCEDURE — 97110 THERAPEUTIC EXERCISES: CPT

## 2025-07-23 NOTE — PROGRESS NOTES
Outpatient Rehab    Physical Therapy Visit    Patient Name: Linn Price  MRN: [id number]  YOB: 1960  Encounter Date: 7/23/2025    Therapy Diagnosis:   Encounter Diagnoses   Name Primary?    Decreased range of motion of neck Yes    Poor posture      Physician: Siddharth Sim MD    Physician Orders: Eval and Treat  Medical Diagnosis: Cervical spondylosis  Osteoarthritis of spine, unspecified spinal osteoarthritis complication status, unspecified spinal region  Surgical Diagnosis: Not applicable for this Episode   Surgical Date: Not applicable for this Episode  Days Since Last Surgery: Not applicable for this Episode    Visit # / Visits Authorized:  11 / 20  Insurance Authorization Period: 6/2/2025 to 12/31/2025  Date of Evaluation: 6/6/2025  Plan of Care Certification: 6/2/2025 to 8/1/2025      PT/PTA:     Number of PTA visits since last PT visit:   Time In: 1305   Time Out: 1345  Total Time (in minutes): 40   Total Billable Time (in minutes): 40    FOTO:  Intake Score (%): 63  Survey Score 2 (%): Not applicable for this Episode  Survey Score 3 (%): Not applicable for this Episode    Precautions:       Subjective   Patient reports increased neck pain today. She needs to leave session early to get back to work..  Pain reported as 6/10.      Objective          Treatment:     Therapeutic Exercise for 25 minutes     UBE 3' forward / 3' backwards level 1.0  Cervical isometrics - Left SB, Right SB, extension 10 sec hold x 10 each  Supine chin tuck 5 sec hold 2 x 10  Supine cervical rotation x 20 each side   Supine flexion AAROM with cane 3 x 10 NPT  Standing open book x 15 each side  Cervical extension with towel 1 x 10  Seated thoracic extension with towel roll 5 sec hold 2 x 10 NPT    Not performed:  Wall slides with towel 3 x 10   Bilateral ER red TB 3 x 10     Manual Therapy for 20 minutes     OA flexion mobilizations  Cervical lateral glides grade II-III  Cervical up glides and down glides  II-III  Seated cervical unilateral P/As with patient lying on wedge   Seated thoracic mobs with patient lying on wedge    Time Entry(in minutes):  Manual Therapy Time Entry: 20  Therapeutic Exercise Time Entry: 25    Assessment & Plan   Assessment:     Patient continues to demonstrate severe forward head posture and significant limitations in cervical ROM. She demonstrates fair tolerance to cervical joint mobs due to c/o increased pain. Cervical and thoracic joint mobs performed within patient's tolerance. Session limited to 40 minutes due to patient needing to leave early to return to work. Added cervical isometrics to HEP.     The patient will continue to benefit from skilled outpatient physical therapy in order to address the deficits listed in the problem list on the initial evaluation, provide patient and family education, and maximize the patients level of independence in the home and community environments.     The patient's spiritual, cultural, and educational needs were considered, and the patient is agreeable to the plan of care and goals.         Plan:  Continue per established PT plan of care.     Goals:   Active       Long Term Goals       Long Term Goals (Progressing)       Start:  06/06/25    Expected End:  08/01/25       1.     Patient will demonstrate bilateral cervical rotation AROM at least 55 degrees.  2.     Patient will demonstrate bilateral shoulder strength 5/5.  3.     Pain Rating at Worst: 3/10 or less to improve overall Quality of Life.    4.     Patient will meet predicted functional outcome (FOTO) score: 70% functional ability or greater to increase self-perceived functional ability.  5.     Patient will be independent with updated HEP at discharge.               Short Term Goals       Short Term Goals (Progressing)       Start:  06/06/25    Expected End:  07/04/25       1.     Patient will be independent with HEP to supplement therapy in return to maximal function.  2.     Pain rating  at Worst: 5/10 or less for improved tolerance with household chores.   3.     Patient will demonstrate bilateral cervical rotation AROM at least 45 degrees.                  Shanti Mora, PT

## 2025-07-28 RX ORDER — MELOXICAM 7.5 MG/1
7.5 TABLET ORAL DAILY
Qty: 30 TABLET | Refills: 0 | Status: SHIPPED | OUTPATIENT
Start: 2025-07-28

## 2025-08-15 ENCOUNTER — CLINICAL SUPPORT (OUTPATIENT)
Dept: REHABILITATION | Facility: HOSPITAL | Age: 65
End: 2025-08-15
Attending: EMERGENCY MEDICINE
Payer: MEDICARE

## 2025-08-15 DIAGNOSIS — R29.898 DECREASED RANGE OF MOTION OF NECK: Primary | ICD-10-CM

## 2025-08-15 DIAGNOSIS — R29.3 POOR POSTURE: ICD-10-CM

## 2025-08-15 PROCEDURE — 97140 MANUAL THERAPY 1/> REGIONS: CPT

## 2025-08-15 PROCEDURE — 97110 THERAPEUTIC EXERCISES: CPT

## 2025-08-18 ENCOUNTER — HOSPITAL ENCOUNTER (OUTPATIENT)
Dept: RADIOLOGY | Facility: HOSPITAL | Age: 65
Discharge: HOME | End: 2025-08-18
Payer: MEDICARE

## 2025-08-18 DIAGNOSIS — Z78.0 POSTMENOPAUSAL: ICD-10-CM

## 2025-08-18 PROCEDURE — 77080 DXA BONE DENSITY AXIAL: CPT | Mod: TC

## 2025-08-18 PROCEDURE — 77080 DXA BONE DENSITY AXIAL: CPT | Mod: 26,,, | Performed by: INTERNAL MEDICINE
